# Patient Record
Sex: MALE | Race: WHITE | NOT HISPANIC OR LATINO
[De-identification: names, ages, dates, MRNs, and addresses within clinical notes are randomized per-mention and may not be internally consistent; named-entity substitution may affect disease eponyms.]

---

## 2017-04-17 ENCOUNTER — FORM ENCOUNTER (OUTPATIENT)
Age: 77
End: 2017-04-17

## 2017-04-18 ENCOUNTER — OUTPATIENT (OUTPATIENT)
Dept: OUTPATIENT SERVICES | Facility: HOSPITAL | Age: 77
LOS: 1 days | End: 2017-04-18
Payer: MEDICARE

## 2017-04-18 ENCOUNTER — APPOINTMENT (OUTPATIENT)
Dept: RADIATION ONCOLOGY | Facility: CLINIC | Age: 77
End: 2017-04-18

## 2017-04-18 VITALS — SYSTOLIC BLOOD PRESSURE: 119 MMHG | DIASTOLIC BLOOD PRESSURE: 72 MMHG

## 2017-04-18 VITALS
HEART RATE: 71 BPM | DIASTOLIC BLOOD PRESSURE: 72 MMHG | WEIGHT: 144.4 LBS | SYSTOLIC BLOOD PRESSURE: 137 MMHG | OXYGEN SATURATION: 99 % | BODY MASS INDEX: 19.56 KG/M2 | HEIGHT: 72 IN

## 2017-04-18 DIAGNOSIS — Z90.49 ACQUIRED ABSENCE OF OTHER SPECIFIED PARTS OF DIGESTIVE TRACT: Chronic | ICD-10-CM

## 2017-04-18 DIAGNOSIS — C32.3 MALIGNANT NEOPLASM OF LARYNGEAL CARTILAGE: Chronic | ICD-10-CM

## 2017-04-18 DIAGNOSIS — Z90.89 ACQUIRED ABSENCE OF OTHER ORGANS: Chronic | ICD-10-CM

## 2017-04-18 PROCEDURE — 70491 CT SOFT TISSUE NECK W/DYE: CPT | Mod: 26

## 2017-04-18 PROCEDURE — 71250 CT THORAX DX C-: CPT

## 2017-04-18 PROCEDURE — 71250 CT THORAX DX C-: CPT | Mod: 26

## 2017-04-18 PROCEDURE — 70491 CT SOFT TISSUE NECK W/DYE: CPT

## 2017-04-19 ENCOUNTER — APPOINTMENT (OUTPATIENT)
Dept: OTOLARYNGOLOGY | Facility: CLINIC | Age: 77
End: 2017-04-19

## 2017-04-19 VITALS
SYSTOLIC BLOOD PRESSURE: 136 MMHG | BODY MASS INDEX: 19.5 KG/M2 | HEIGHT: 72 IN | HEART RATE: 69 BPM | TEMPERATURE: 98.6 F | WEIGHT: 144 LBS | DIASTOLIC BLOOD PRESSURE: 76 MMHG

## 2017-04-19 DIAGNOSIS — Z80.2 FAMILY HISTORY OF MALIGNANT NEOPLASM OF OTHER RESPIRATORY AND INTRATHORACIC ORGANS: ICD-10-CM

## 2017-04-19 DIAGNOSIS — Z80.3 FAMILY HISTORY OF MALIGNANT NEOPLASM OF BREAST: ICD-10-CM

## 2017-07-19 ENCOUNTER — APPOINTMENT (OUTPATIENT)
Dept: OTOLARYNGOLOGY | Facility: CLINIC | Age: 77
End: 2017-07-19

## 2017-07-19 VITALS
HEART RATE: 67 BPM | TEMPERATURE: 98.7 F | DIASTOLIC BLOOD PRESSURE: 74 MMHG | SYSTOLIC BLOOD PRESSURE: 125 MMHG | WEIGHT: 144 LBS | HEIGHT: 72 IN | BODY MASS INDEX: 19.5 KG/M2

## 2017-07-19 VITALS — WEIGHT: 145.6 LBS | BODY MASS INDEX: 19.75 KG/M2

## 2017-08-09 ENCOUNTER — APPOINTMENT (OUTPATIENT)
Dept: OTOLARYNGOLOGY | Facility: CLINIC | Age: 77
End: 2017-08-09
Payer: MEDICARE

## 2017-08-09 VITALS
BODY MASS INDEX: 19.64 KG/M2 | SYSTOLIC BLOOD PRESSURE: 145 MMHG | DIASTOLIC BLOOD PRESSURE: 77 MMHG | HEIGHT: 72 IN | TEMPERATURE: 99.2 F | WEIGHT: 145 LBS | HEART RATE: 81 BPM

## 2017-08-09 PROCEDURE — 99213 OFFICE O/P EST LOW 20 MIN: CPT | Mod: 25

## 2017-08-09 PROCEDURE — 31575 DIAGNOSTIC LARYNGOSCOPY: CPT

## 2017-11-01 ENCOUNTER — OUTPATIENT (OUTPATIENT)
Dept: OUTPATIENT SERVICES | Facility: HOSPITAL | Age: 77
LOS: 1 days | End: 2017-11-01
Payer: MEDICARE

## 2017-11-01 DIAGNOSIS — C32.3 MALIGNANT NEOPLASM OF LARYNGEAL CARTILAGE: Chronic | ICD-10-CM

## 2017-11-01 DIAGNOSIS — Z90.89 ACQUIRED ABSENCE OF OTHER ORGANS: Chronic | ICD-10-CM

## 2017-11-01 DIAGNOSIS — Z90.49 ACQUIRED ABSENCE OF OTHER SPECIFIED PARTS OF DIGESTIVE TRACT: Chronic | ICD-10-CM

## 2017-11-01 PROCEDURE — 70491 CT SOFT TISSUE NECK W/DYE: CPT | Mod: 26

## 2017-11-01 PROCEDURE — 70491 CT SOFT TISSUE NECK W/DYE: CPT

## 2017-11-14 ENCOUNTER — APPOINTMENT (OUTPATIENT)
Dept: RADIATION ONCOLOGY | Facility: CLINIC | Age: 77
End: 2017-11-14
Payer: MEDICARE

## 2017-11-14 PROCEDURE — 31575 DIAGNOSTIC LARYNGOSCOPY: CPT

## 2017-11-14 PROCEDURE — 99214 OFFICE O/P EST MOD 30 MIN: CPT | Mod: 25

## 2017-11-14 RX ORDER — NYSTATIN 100000 [USP'U]/ML
100000 SUSPENSION ORAL 3 TIMES DAILY
Qty: 105 | Refills: 0 | Status: DISCONTINUED | COMMUNITY
Start: 2017-07-19 | End: 2017-11-14

## 2017-12-07 ENCOUNTER — CLINICAL ADVICE (OUTPATIENT)
Age: 77
End: 2017-12-07

## 2017-12-11 ENCOUNTER — APPOINTMENT (OUTPATIENT)
Dept: OTOLARYNGOLOGY | Facility: CLINIC | Age: 77
End: 2017-12-11
Payer: MEDICARE

## 2017-12-11 VITALS
WEIGHT: 142 LBS | SYSTOLIC BLOOD PRESSURE: 147 MMHG | DIASTOLIC BLOOD PRESSURE: 76 MMHG | HEIGHT: 72 IN | BODY MASS INDEX: 19.23 KG/M2 | HEART RATE: 64 BPM

## 2017-12-11 PROCEDURE — 99214 OFFICE O/P EST MOD 30 MIN: CPT | Mod: 25

## 2017-12-11 PROCEDURE — 31575 DIAGNOSTIC LARYNGOSCOPY: CPT

## 2017-12-15 ENCOUNTER — OUTPATIENT (OUTPATIENT)
Dept: OUTPATIENT SERVICES | Facility: HOSPITAL | Age: 77
LOS: 1 days | End: 2017-12-15
Payer: MEDICARE

## 2017-12-15 ENCOUNTER — RESULT REVIEW (OUTPATIENT)
Age: 77
End: 2017-12-15

## 2017-12-15 DIAGNOSIS — Z90.89 ACQUIRED ABSENCE OF OTHER ORGANS: Chronic | ICD-10-CM

## 2017-12-15 DIAGNOSIS — C10.9 MALIGNANT NEOPLASM OF OROPHARYNX, UNSPECIFIED: ICD-10-CM

## 2017-12-15 DIAGNOSIS — C32.1 MALIGNANT NEOPLASM OF SUPRAGLOTTIS: ICD-10-CM

## 2017-12-15 DIAGNOSIS — C32.3 MALIGNANT NEOPLASM OF LARYNGEAL CARTILAGE: Chronic | ICD-10-CM

## 2017-12-15 DIAGNOSIS — Z90.49 ACQUIRED ABSENCE OF OTHER SPECIFIED PARTS OF DIGESTIVE TRACT: Chronic | ICD-10-CM

## 2017-12-15 LAB — SURGICAL PATHOLOGY STUDY: SIGNIFICANT CHANGE UP

## 2017-12-15 PROCEDURE — 88321 CONSLTJ&REPRT SLD PREP ELSWR: CPT

## 2017-12-20 ENCOUNTER — APPOINTMENT (OUTPATIENT)
Dept: OTOLARYNGOLOGY | Facility: CLINIC | Age: 77
End: 2017-12-20
Payer: MEDICARE

## 2017-12-20 VITALS
HEIGHT: 72 IN | WEIGHT: 142 LBS | BODY MASS INDEX: 19.23 KG/M2 | SYSTOLIC BLOOD PRESSURE: 146 MMHG | HEART RATE: 71 BPM | DIASTOLIC BLOOD PRESSURE: 71 MMHG | TEMPERATURE: 98.8 F

## 2017-12-20 VITALS — WEIGHT: 142 LBS | HEIGHT: 72 IN | BODY MASS INDEX: 19.23 KG/M2

## 2017-12-20 DIAGNOSIS — K13.79 OTHER LESIONS OF ORAL MUCOSA: ICD-10-CM

## 2017-12-20 PROCEDURE — 99214 OFFICE O/P EST MOD 30 MIN: CPT | Mod: 25

## 2017-12-20 PROCEDURE — 31575 DIAGNOSTIC LARYNGOSCOPY: CPT

## 2017-12-28 ENCOUNTER — OUTPATIENT (OUTPATIENT)
Dept: OUTPATIENT SERVICES | Facility: HOSPITAL | Age: 77
LOS: 1 days | End: 2017-12-28
Payer: MEDICARE

## 2017-12-28 DIAGNOSIS — Z90.89 ACQUIRED ABSENCE OF OTHER ORGANS: Chronic | ICD-10-CM

## 2017-12-28 DIAGNOSIS — Z90.49 ACQUIRED ABSENCE OF OTHER SPECIFIED PARTS OF DIGESTIVE TRACT: Chronic | ICD-10-CM

## 2017-12-28 DIAGNOSIS — C32.3 MALIGNANT NEOPLASM OF LARYNGEAL CARTILAGE: Chronic | ICD-10-CM

## 2017-12-28 PROCEDURE — 71046 X-RAY EXAM CHEST 2 VIEWS: CPT

## 2017-12-28 PROCEDURE — 71020: CPT | Mod: 26

## 2018-01-22 VITALS
DIASTOLIC BLOOD PRESSURE: 74 MMHG | SYSTOLIC BLOOD PRESSURE: 154 MMHG | TEMPERATURE: 98 F | WEIGHT: 145.06 LBS | HEART RATE: 86 BPM | RESPIRATION RATE: 16 BRPM | OXYGEN SATURATION: 99 % | HEIGHT: 72 IN

## 2018-01-22 NOTE — PATIENT PROFILE ADULT. - PSH
Carcinoma of laryngeal cartilages    Elective surgery  Lymph Nodes Removal of the Neck  Elective surgery  Biopsy of Larynx  History of tonsillectomy    S/P appendectomy

## 2018-01-23 ENCOUNTER — RESULT REVIEW (OUTPATIENT)
Age: 78
End: 2018-01-23

## 2018-01-23 ENCOUNTER — APPOINTMENT (OUTPATIENT)
Dept: OTOLARYNGOLOGY | Facility: HOSPITAL | Age: 78
End: 2018-01-23

## 2018-01-23 ENCOUNTER — OUTPATIENT (OUTPATIENT)
Dept: INPATIENT UNIT | Facility: HOSPITAL | Age: 78
LOS: 1 days | Discharge: HOME | End: 2018-01-23
Payer: MEDICARE

## 2018-01-23 DIAGNOSIS — Z41.9 ENCOUNTER FOR PROCEDURE FOR PURPOSES OTHER THAN REMEDYING HEALTH STATE, UNSPECIFIED: Chronic | ICD-10-CM

## 2018-01-23 DIAGNOSIS — C32.3 MALIGNANT NEOPLASM OF LARYNGEAL CARTILAGE: Chronic | ICD-10-CM

## 2018-01-23 DIAGNOSIS — Z90.89 ACQUIRED ABSENCE OF OTHER ORGANS: Chronic | ICD-10-CM

## 2018-01-23 DIAGNOSIS — Z90.49 ACQUIRED ABSENCE OF OTHER SPECIFIED PARTS OF DIGESTIVE TRACT: Chronic | ICD-10-CM

## 2018-01-23 PROCEDURE — 31535 LARYNGOSCOPY W/BIOPSY: CPT

## 2018-01-23 PROCEDURE — 42892 REVISION OF PHARYNGEAL WALLS: CPT

## 2018-01-23 PROCEDURE — 15733 MUSC MYOQ/FSCQ FLP H&N PEDCL: CPT

## 2018-01-23 RX ORDER — ONDANSETRON 8 MG/1
4 TABLET, FILM COATED ORAL EVERY 6 HOURS
Qty: 0 | Refills: 0 | Status: DISCONTINUED | OUTPATIENT
Start: 2018-01-23 | End: 2018-01-24

## 2018-01-23 RX ORDER — ACETAMINOPHEN 500 MG
985 TABLET ORAL EVERY 4 HOURS
Qty: 0 | Refills: 0 | Status: DISCONTINUED | OUTPATIENT
Start: 2018-01-23 | End: 2018-01-24

## 2018-01-23 RX ORDER — MORPHINE SULFATE 50 MG/1
4 CAPSULE, EXTENDED RELEASE ORAL
Qty: 0 | Refills: 0 | Status: DISCONTINUED | OUTPATIENT
Start: 2018-01-23 | End: 2018-01-23

## 2018-01-23 RX ORDER — CHLORHEXIDINE GLUCONATE 213 G/1000ML
15 SOLUTION TOPICAL THREE TIMES A DAY
Qty: 0 | Refills: 0 | Status: DISCONTINUED | OUTPATIENT
Start: 2018-01-24 | End: 2018-01-24

## 2018-01-23 RX ORDER — DEXAMETHASONE 0.5 MG/5ML
8 ELIXIR ORAL EVERY 8 HOURS
Qty: 0 | Refills: 0 | Status: DISCONTINUED | OUTPATIENT
Start: 2018-01-23 | End: 2018-01-24

## 2018-01-23 RX ORDER — OXYCODONE AND ACETAMINOPHEN 5; 325 MG/1; MG/1
1 TABLET ORAL EVERY 4 HOURS
Qty: 0 | Refills: 0 | Status: DISCONTINUED | OUTPATIENT
Start: 2018-01-23 | End: 2018-01-24

## 2018-01-23 RX ORDER — SODIUM CHLORIDE 9 MG/ML
1000 INJECTION, SOLUTION INTRAVENOUS
Qty: 0 | Refills: 0 | Status: DISCONTINUED | OUTPATIENT
Start: 2018-01-23 | End: 2018-01-24

## 2018-01-23 RX ADMIN — Medication 101.6 MILLIGRAM(S): at 18:15

## 2018-01-23 NOTE — BRIEF OPERATIVE NOTE - PROCEDURE
<<-----Click on this checkbox to enter Procedure Direct laryngoscopy with biopsy  01/23/2018  and excisional biopsy of right soft palate lesion  Active  Ari Franco

## 2018-01-23 NOTE — PROGRESS NOTE ADULT - SUBJECTIVE AND OBJECTIVE BOX
HPI: 77y Male s/p DL/bx, excisional bx of right soft palate lesion. Seen in SDU. Pain controlled. no nausea. doing well. not attempted PO as yet.     Vital Signs Last 24 Hrs  T(C): 36.2 (23 Jan 2018 17:12), Max: 36.7 (23 Jan 2018 14:30)  T(F): 97.1 (23 Jan 2018 17:12), Max: 98.1 (23 Jan 2018 14:30)  HR: 80 (23 Jan 2018 17:12) (72 - 88)  BP: 153/75 (23 Jan 2018 17:12) (144/80 - 176/94)  BP(mean): 107 (23 Jan 2018 17:12) (103 - 121)  RR: 18 (23 Jan 2018 17:12) (9 - 18)  SpO2: 99% (23 Jan 2018 17:12) (97% - 99%)    PHYSICAL EXAM:  NAD, breathing comfortably  AAO3, conversant  SOUTH  OC/OP: incision intake, no active bleed, mucosal moist    Assessment/Plan:  77y Male s/p DL/bx, excisional bx of right soft palate lesion. Doing well post op  - PO clears to progress to soft tomorrow  - Ambulate and SCDs when in bed  - PRN analgesia and antiemetics  - observe overnight for airway swelling  - dispo pending assessment tomorrow am  - resume all home meds    [facility] at [phone] with any questions/concerns.

## 2018-01-23 NOTE — BRIEF OPERATIVE NOTE - PROCEDURE
<<-----Click on this checkbox to enter Procedure Direct laryngoscopy with biopsy  01/23/2018  and esophagoscopy  Active  [facility]

## 2018-01-23 NOTE — PACU DISCHARGE NOTE - COMMENTS
pt aao x3.  VSS.  no oral bleeding noted.  O2 sat 99% on face tent.  denies c/o pain or SOB.  report given to CLARK Darden on 8 lachman.  pt to go to [facility] via stretcher on monitor with RN and transport

## 2018-01-24 VITALS — TEMPERATURE: 99 F

## 2018-01-24 PROCEDURE — 88331 PATH CONSLTJ SURG 1 BLK 1SPC: CPT

## 2018-01-24 PROCEDURE — 42120 REMOVE PALATE/LESION: CPT

## 2018-01-24 PROCEDURE — 88307 TISSUE EXAM BY PATHOLOGIST: CPT

## 2018-01-24 PROCEDURE — 31525 DX LARYNGOSCOPY EXCL NB: CPT

## 2018-01-24 PROCEDURE — 88305 TISSUE EXAM BY PATHOLOGIST: CPT

## 2018-01-24 PROCEDURE — 14040 TIS TRNFR F/C/C/M/N/A/G/H/F: CPT

## 2018-01-24 RX ORDER — BENZOCAINE AND MENTHOL 5; 1 G/100ML; G/100ML
1 LIQUID ORAL EVERY 4 HOURS
Qty: 0 | Refills: 0 | Status: DISCONTINUED | OUTPATIENT
Start: 2018-01-24 | End: 2018-01-24

## 2018-01-24 RX ADMIN — Medication 101.6 MILLIGRAM(S): at 01:55

## 2018-01-24 RX ADMIN — CHLORHEXIDINE GLUCONATE 15 MILLILITER(S): 213 SOLUTION TOPICAL at 05:54

## 2018-01-24 RX ADMIN — CHLORHEXIDINE GLUCONATE 15 MILLILITER(S): 213 SOLUTION TOPICAL at 14:51

## 2018-01-24 NOTE — PROGRESS NOTE ADULT - SUBJECTIVE AND OBJECTIVE BOX
77y Male s/p DL/bx, excisional bx of right soft palate lesion. Seen in SDU. Pain controlled. no nausea. doing well. Tolerated PO. ambulating    Vital Signs Last 24 Hrs  T(C): 36.6 (24 Jan 2018 06:13), Max: 36.8 (23 Jan 2018 21:52)  T(F): 97.8 (24 Jan 2018 06:13), Max: 98.3 (23 Jan 2018 21:52)  HR: 66 (24 Jan 2018 05:40) (66 - 88)  BP: 148/67 (24 Jan 2018 05:40) (126/60 - 176/94)  BP(mean): 97 (24 Jan 2018 05:40) (86 - 121)  RR: 18 (24 Jan 2018 05:40) (9 - 18)  SpO2: 96% (24 Jan 2018 05:40) (95% - 100%)    PHYSICAL EXAM:  NAD, breathing comfortably  AAO3, conversant  SOUTH  OC/OP: incision intake, no active bleed, mucosal moist    Assessment/Plan:  77y Male s/p DL/bx, excisional bx of right soft palate lesion. Doing well post op  - dc home  - no additional meds  - rinse mouth tid with warm salt water and after meals and snacks  - Pt discussed with Dr Edy Galaviz ENT at [phone] with any questions/concerns.

## 2018-01-26 LAB — SURGICAL PATHOLOGY STUDY: SIGNIFICANT CHANGE UP

## 2018-01-29 ENCOUNTER — APPOINTMENT (OUTPATIENT)
Dept: OTOLARYNGOLOGY | Facility: CLINIC | Age: 78
End: 2018-01-29
Payer: MEDICARE

## 2018-01-29 ENCOUNTER — FORM ENCOUNTER (OUTPATIENT)
Age: 78
End: 2018-01-29

## 2018-01-29 ENCOUNTER — APPOINTMENT (OUTPATIENT)
Dept: RADIATION ONCOLOGY | Facility: CLINIC | Age: 78
End: 2018-01-29
Payer: MEDICARE

## 2018-01-29 VITALS
DIASTOLIC BLOOD PRESSURE: 69 MMHG | SYSTOLIC BLOOD PRESSURE: 140 MMHG | WEIGHT: 140 LBS | BODY MASS INDEX: 18.96 KG/M2 | HEART RATE: 75 BPM | HEIGHT: 72 IN

## 2018-01-29 VITALS — OXYGEN SATURATION: 100 % | DIASTOLIC BLOOD PRESSURE: 71 MMHG | HEART RATE: 71 BPM | SYSTOLIC BLOOD PRESSURE: 150 MMHG

## 2018-01-29 VITALS
OXYGEN SATURATION: 99 % | BODY MASS INDEX: 19.06 KG/M2 | WEIGHT: 140.5 LBS | HEART RATE: 79 BPM | SYSTOLIC BLOOD PRESSURE: 138 MMHG | DIASTOLIC BLOOD PRESSURE: 76 MMHG

## 2018-01-29 DIAGNOSIS — C10.0: ICD-10-CM

## 2018-01-29 PROCEDURE — 99215 OFFICE O/P EST HI 40 MIN: CPT

## 2018-01-29 PROCEDURE — 31575 DIAGNOSTIC LARYNGOSCOPY: CPT | Mod: 79

## 2018-01-30 ENCOUNTER — OUTPATIENT (OUTPATIENT)
Dept: OUTPATIENT SERVICES | Facility: HOSPITAL | Age: 78
LOS: 1 days | End: 2018-01-30
Payer: MEDICARE

## 2018-01-30 DIAGNOSIS — Z41.9 ENCOUNTER FOR PROCEDURE FOR PURPOSES OTHER THAN REMEDYING HEALTH STATE, UNSPECIFIED: Chronic | ICD-10-CM

## 2018-01-30 DIAGNOSIS — C32.3 MALIGNANT NEOPLASM OF LARYNGEAL CARTILAGE: Chronic | ICD-10-CM

## 2018-01-30 DIAGNOSIS — Z90.89 ACQUIRED ABSENCE OF OTHER ORGANS: Chronic | ICD-10-CM

## 2018-01-30 DIAGNOSIS — Z90.49 ACQUIRED ABSENCE OF OTHER SPECIFIED PARTS OF DIGESTIVE TRACT: Chronic | ICD-10-CM

## 2018-01-30 LAB — GLUCOSE BLDC GLUCOMTR-MCNC: 87 MG/DL — SIGNIFICANT CHANGE UP (ref 70–99)

## 2018-01-30 PROCEDURE — 82962 GLUCOSE BLOOD TEST: CPT

## 2018-01-30 PROCEDURE — A9552: CPT

## 2018-01-30 PROCEDURE — 78815 PET IMAGE W/CT SKULL-THIGH: CPT | Mod: 26

## 2018-01-30 PROCEDURE — 78815 PET IMAGE W/CT SKULL-THIGH: CPT

## 2018-02-02 PROCEDURE — 77470 SPECIAL RADIATION TREATMENT: CPT | Mod: 26

## 2018-03-06 PROCEDURE — 77301 RADIOTHERAPY DOSE PLAN IMRT: CPT | Mod: 26

## 2018-03-06 PROCEDURE — 77300 RADIATION THERAPY DOSE PLAN: CPT | Mod: 26

## 2018-03-06 PROCEDURE — 77338 DESIGN MLC DEVICE FOR IMRT: CPT | Mod: 26

## 2018-03-07 ENCOUNTER — APPOINTMENT (OUTPATIENT)
Dept: OTOLARYNGOLOGY | Facility: CLINIC | Age: 78
End: 2018-03-07
Payer: MEDICARE

## 2018-03-07 VITALS
HEIGHT: 72 IN | WEIGHT: 138 LBS | BODY MASS INDEX: 18.69 KG/M2 | HEART RATE: 64 BPM | DIASTOLIC BLOOD PRESSURE: 75 MMHG | SYSTOLIC BLOOD PRESSURE: 147 MMHG

## 2018-03-07 PROCEDURE — 99024 POSTOP FOLLOW-UP VISIT: CPT

## 2018-03-12 PROCEDURE — 77387B: CUSTOM | Mod: 26

## 2018-03-12 PROCEDURE — 77427 RADIATION TX MANAGEMENT X5: CPT

## 2018-03-13 VITALS — SYSTOLIC BLOOD PRESSURE: 143 MMHG | HEART RATE: 78 BPM | DIASTOLIC BLOOD PRESSURE: 84 MMHG

## 2018-03-13 VITALS — WEIGHT: 143 LBS | BODY MASS INDEX: 19.39 KG/M2

## 2018-03-13 VITALS
OXYGEN SATURATION: 98 % | HEART RATE: 69 BPM | SYSTOLIC BLOOD PRESSURE: 142 MMHG | DIASTOLIC BLOOD PRESSURE: 82 MMHG | RESPIRATION RATE: 18 BRPM

## 2018-03-13 PROCEDURE — 77387B: CUSTOM | Mod: 26

## 2018-03-14 PROCEDURE — 77387B: CUSTOM | Mod: 26

## 2018-03-15 PROCEDURE — 77387B: CUSTOM | Mod: 26

## 2018-03-16 PROCEDURE — 77387B: CUSTOM | Mod: 26

## 2018-03-19 PROCEDURE — 77427 RADIATION TX MANAGEMENT X5: CPT

## 2018-03-19 PROCEDURE — 77387B: CUSTOM | Mod: 26

## 2018-03-20 PROCEDURE — 77387B: CUSTOM | Mod: 26

## 2018-03-21 VITALS — BODY MASS INDEX: 19.91 KG/M2 | WEIGHT: 147 LBS | HEIGHT: 72 IN

## 2018-03-21 PROCEDURE — 77387B: CUSTOM | Mod: 26

## 2018-03-22 PROCEDURE — 77387B: CUSTOM | Mod: 26

## 2018-03-23 PROCEDURE — 77387B: CUSTOM | Mod: 26

## 2018-03-26 PROCEDURE — 77427 RADIATION TX MANAGEMENT X5: CPT

## 2018-03-26 PROCEDURE — 77387B: CUSTOM | Mod: 26

## 2018-03-27 VITALS — WEIGHT: 142.6 LBS | BODY MASS INDEX: 19.34 KG/M2

## 2018-03-27 VITALS
HEART RATE: 55 BPM | DIASTOLIC BLOOD PRESSURE: 81 MMHG | OXYGEN SATURATION: 100 % | SYSTOLIC BLOOD PRESSURE: 148 MMHG | RESPIRATION RATE: 18 BRPM

## 2018-03-27 PROCEDURE — 77387B: CUSTOM | Mod: 26

## 2018-03-28 PROCEDURE — 77387B: CUSTOM | Mod: 26

## 2018-03-29 ENCOUNTER — APPOINTMENT (OUTPATIENT)
Dept: OTOLARYNGOLOGY | Facility: CLINIC | Age: 78
End: 2018-03-29
Payer: MEDICARE

## 2018-03-29 PROCEDURE — G8997: CPT | Mod: GN,NC,CI

## 2018-03-29 PROCEDURE — 77387B: CUSTOM | Mod: 26

## 2018-03-29 PROCEDURE — 92526 ORAL FUNCTION THERAPY: CPT | Mod: GN

## 2018-03-29 PROCEDURE — 92610 EVALUATE SWALLOWING FUNCTION: CPT | Mod: GN

## 2018-03-29 PROCEDURE — G8996: CPT | Mod: GN,NC,CJ

## 2018-03-30 PROCEDURE — 77387B: CUSTOM | Mod: 26

## 2018-04-02 PROCEDURE — 77387B: CUSTOM | Mod: 26

## 2018-04-02 PROCEDURE — 77427 RADIATION TX MANAGEMENT X5: CPT

## 2018-04-03 VITALS
OXYGEN SATURATION: 100 % | RESPIRATION RATE: 18 BRPM | DIASTOLIC BLOOD PRESSURE: 78 MMHG | SYSTOLIC BLOOD PRESSURE: 123 MMHG | HEART RATE: 73 BPM

## 2018-04-03 VITALS — WEIGHT: 143.8 LBS | BODY MASS INDEX: 19.5 KG/M2

## 2018-04-03 PROCEDURE — 77387B: CUSTOM | Mod: 26

## 2018-04-04 PROCEDURE — 77387B: CUSTOM | Mod: 26

## 2018-04-05 PROCEDURE — 77387B: CUSTOM | Mod: 26

## 2018-04-06 PROCEDURE — 77387B: CUSTOM | Mod: 26

## 2018-04-09 PROCEDURE — 77387B: CUSTOM | Mod: 26

## 2018-04-09 PROCEDURE — 77427 RADIATION TX MANAGEMENT X5: CPT

## 2018-04-10 ENCOUNTER — APPOINTMENT (OUTPATIENT)
Dept: OTOLARYNGOLOGY | Facility: CLINIC | Age: 78
End: 2018-04-10
Payer: MEDICARE

## 2018-04-10 VITALS
HEART RATE: 59 BPM | RESPIRATION RATE: 18 BRPM | OXYGEN SATURATION: 100 % | DIASTOLIC BLOOD PRESSURE: 83 MMHG | SYSTOLIC BLOOD PRESSURE: 135 MMHG

## 2018-04-10 VITALS — HEART RATE: 65 BPM | SYSTOLIC BLOOD PRESSURE: 133 MMHG | DIASTOLIC BLOOD PRESSURE: 74 MMHG

## 2018-04-10 VITALS — WEIGHT: 142.9 LBS | BODY MASS INDEX: 19.38 KG/M2

## 2018-04-10 PROCEDURE — 92526 ORAL FUNCTION THERAPY: CPT | Mod: GN

## 2018-04-10 PROCEDURE — 77387B: CUSTOM | Mod: 26

## 2018-04-10 PROCEDURE — G8996: CPT | Mod: GN,NC,CJ

## 2018-04-10 PROCEDURE — G8997: CPT | Mod: GN,NC,CI

## 2018-04-11 VITALS — WEIGHT: 143.6 LBS | BODY MASS INDEX: 19.48 KG/M2

## 2018-04-11 PROCEDURE — 77387B: CUSTOM | Mod: 26

## 2018-04-12 PROCEDURE — 77387B: CUSTOM | Mod: 26

## 2018-04-13 PROCEDURE — 77387B: CUSTOM | Mod: 26

## 2018-04-16 PROCEDURE — 77427 RADIATION TX MANAGEMENT X5: CPT

## 2018-04-16 PROCEDURE — 77387B: CUSTOM | Mod: 26

## 2018-04-17 VITALS
HEART RATE: 62 BPM | SYSTOLIC BLOOD PRESSURE: 134 MMHG | DIASTOLIC BLOOD PRESSURE: 74 MMHG | OXYGEN SATURATION: 100 % | WEIGHT: 139.2 LBS | BODY MASS INDEX: 18.88 KG/M2 | RESPIRATION RATE: 18 BRPM

## 2018-04-17 VITALS — SYSTOLIC BLOOD PRESSURE: 146 MMHG | HEART RATE: 63 BPM | DIASTOLIC BLOOD PRESSURE: 75 MMHG

## 2018-04-17 PROCEDURE — 77387B: CUSTOM | Mod: 26

## 2018-04-18 PROCEDURE — 77387B: CUSTOM | Mod: 26

## 2018-04-19 PROCEDURE — 77387B: CUSTOM | Mod: 26

## 2018-04-20 PROCEDURE — 77387B: CUSTOM | Mod: 26

## 2018-04-23 PROCEDURE — 77387B: CUSTOM | Mod: 26

## 2018-04-23 PROCEDURE — 77427 RADIATION TX MANAGEMENT X5: CPT

## 2018-04-24 VITALS — DIASTOLIC BLOOD PRESSURE: 75 MMHG | SYSTOLIC BLOOD PRESSURE: 135 MMHG

## 2018-04-24 VITALS
OXYGEN SATURATION: 100 % | HEART RATE: 67 BPM | WEIGHT: 141.6 LBS | RESPIRATION RATE: 18 BRPM | SYSTOLIC BLOOD PRESSURE: 135 MMHG | DIASTOLIC BLOOD PRESSURE: 85 MMHG | BODY MASS INDEX: 19.2 KG/M2

## 2018-04-24 PROCEDURE — 77387B: CUSTOM | Mod: 26

## 2018-04-25 PROCEDURE — 77387B: CUSTOM | Mod: 26

## 2018-04-26 PROCEDURE — 77387B: CUSTOM | Mod: 26

## 2018-04-27 PROCEDURE — 77387B: CUSTOM | Mod: 26

## 2018-05-08 ENCOUNTER — APPOINTMENT (OUTPATIENT)
Dept: OTOLARYNGOLOGY | Facility: CLINIC | Age: 78
End: 2018-05-08
Payer: MEDICARE

## 2018-05-08 VITALS
DIASTOLIC BLOOD PRESSURE: 69 MMHG | BODY MASS INDEX: 18.42 KG/M2 | HEART RATE: 61 BPM | WEIGHT: 136 LBS | HEIGHT: 72 IN | SYSTOLIC BLOOD PRESSURE: 107 MMHG

## 2018-05-08 PROCEDURE — 99214 OFFICE O/P EST MOD 30 MIN: CPT | Mod: 25

## 2018-05-08 PROCEDURE — 31575 DIAGNOSTIC LARYNGOSCOPY: CPT

## 2018-05-22 ENCOUNTER — APPOINTMENT (OUTPATIENT)
Dept: RADIATION ONCOLOGY | Facility: CLINIC | Age: 78
End: 2018-05-22
Payer: MEDICARE

## 2018-05-22 VITALS — DIASTOLIC BLOOD PRESSURE: 75 MMHG | SYSTOLIC BLOOD PRESSURE: 133 MMHG | HEART RATE: 67 BPM

## 2018-05-22 VITALS
HEART RATE: 60 BPM | RESPIRATION RATE: 18 BRPM | DIASTOLIC BLOOD PRESSURE: 69 MMHG | SYSTOLIC BLOOD PRESSURE: 126 MMHG | OXYGEN SATURATION: 100 %

## 2018-05-22 VITALS — WEIGHT: 138.2 LBS | BODY MASS INDEX: 18.74 KG/M2

## 2018-05-22 PROCEDURE — 99024 POSTOP FOLLOW-UP VISIT: CPT

## 2018-05-22 RX ORDER — SALIVA SUBSTITUTE COMB NO.10
POWDER IN PACKET (EA) MUCOUS MEMBRANE
Qty: 1 | Refills: 3 | Status: DISCONTINUED | COMMUNITY
Start: 2018-04-25 | End: 2018-05-22

## 2018-05-23 ENCOUNTER — APPOINTMENT (OUTPATIENT)
Dept: OTOLARYNGOLOGY | Facility: CLINIC | Age: 78
End: 2018-05-23

## 2018-05-24 ENCOUNTER — APPOINTMENT (OUTPATIENT)
Dept: OTOLARYNGOLOGY | Facility: CLINIC | Age: 78
End: 2018-05-24

## 2018-07-10 ENCOUNTER — APPOINTMENT (OUTPATIENT)
Dept: RADIATION ONCOLOGY | Facility: CLINIC | Age: 78
End: 2018-07-10
Payer: MEDICARE

## 2018-07-10 ENCOUNTER — APPOINTMENT (OUTPATIENT)
Dept: OTOLARYNGOLOGY | Facility: CLINIC | Age: 78
End: 2018-07-10
Payer: MEDICARE

## 2018-07-10 VITALS
OXYGEN SATURATION: 99 % | HEART RATE: 62 BPM | WEIGHT: 134.2 LBS | BODY MASS INDEX: 18.2 KG/M2 | SYSTOLIC BLOOD PRESSURE: 119 MMHG | RESPIRATION RATE: 16 BRPM | DIASTOLIC BLOOD PRESSURE: 69 MMHG

## 2018-07-10 VITALS
HEART RATE: 64 BPM | SYSTOLIC BLOOD PRESSURE: 128 MMHG | WEIGHT: 133 LBS | BODY MASS INDEX: 18.01 KG/M2 | DIASTOLIC BLOOD PRESSURE: 71 MMHG | HEIGHT: 72 IN

## 2018-07-10 PROCEDURE — G8997: CPT | Mod: NC,GN,CI

## 2018-07-10 PROCEDURE — 99214 OFFICE O/P EST MOD 30 MIN: CPT | Mod: 25

## 2018-07-10 PROCEDURE — 92610 EVALUATE SWALLOWING FUNCTION: CPT | Mod: GN

## 2018-07-10 PROCEDURE — 31575 DIAGNOSTIC LARYNGOSCOPY: CPT

## 2018-07-10 PROCEDURE — G8996: CPT | Mod: NC,GN,CJ

## 2018-07-24 ENCOUNTER — FORM ENCOUNTER (OUTPATIENT)
Age: 78
End: 2018-07-24

## 2018-07-25 ENCOUNTER — OUTPATIENT (OUTPATIENT)
Dept: OUTPATIENT SERVICES | Facility: HOSPITAL | Age: 78
LOS: 1 days | End: 2018-07-25
Payer: MEDICARE

## 2018-07-25 DIAGNOSIS — Z90.49 ACQUIRED ABSENCE OF OTHER SPECIFIED PARTS OF DIGESTIVE TRACT: Chronic | ICD-10-CM

## 2018-07-25 DIAGNOSIS — C32.3 MALIGNANT NEOPLASM OF LARYNGEAL CARTILAGE: Chronic | ICD-10-CM

## 2018-07-25 DIAGNOSIS — Z41.9 ENCOUNTER FOR PROCEDURE FOR PURPOSES OTHER THAN REMEDYING HEALTH STATE, UNSPECIFIED: Chronic | ICD-10-CM

## 2018-07-25 DIAGNOSIS — Z90.89 ACQUIRED ABSENCE OF OTHER ORGANS: Chronic | ICD-10-CM

## 2018-07-25 LAB — GLUCOSE BLDC GLUCOMTR-MCNC: 88 MG/DL — SIGNIFICANT CHANGE UP (ref 70–99)

## 2018-07-25 PROCEDURE — 78815 PET IMAGE W/CT SKULL-THIGH: CPT | Mod: 26

## 2018-07-25 PROCEDURE — A9552: CPT

## 2018-07-25 PROCEDURE — 78815 PET IMAGE W/CT SKULL-THIGH: CPT

## 2018-07-25 PROCEDURE — 82962 GLUCOSE BLOOD TEST: CPT

## 2018-08-02 ENCOUNTER — APPOINTMENT (OUTPATIENT)
Dept: RADIATION ONCOLOGY | Facility: CLINIC | Age: 78
End: 2018-08-02
Payer: MEDICARE

## 2018-08-02 VITALS
OXYGEN SATURATION: 100 % | HEART RATE: 65 BPM | WEIGHT: 138 LBS | SYSTOLIC BLOOD PRESSURE: 145 MMHG | RESPIRATION RATE: 16 BRPM | BODY MASS INDEX: 18.72 KG/M2 | DIASTOLIC BLOOD PRESSURE: 72 MMHG

## 2018-08-02 PROCEDURE — 99214 OFFICE O/P EST MOD 30 MIN: CPT

## 2018-08-02 RX ORDER — LIDOCAINE HYDROCHLORIDE 20 MG/ML
2 SOLUTION OROPHARYNGEAL
Qty: 1 | Refills: 2 | Status: DISCONTINUED | COMMUNITY
Start: 2018-04-10 | End: 2018-08-02

## 2018-08-02 RX ORDER — DIPHENHYDRAMINE HYDROCHLORIDE AND LIDOCAINE HYDROCHLORIDE AND ALUMINUM HYDROXIDE AND MAGNESIUM HYDRO
KIT
Qty: 500 | Refills: 2 | Status: DISCONTINUED | COMMUNITY
Start: 2018-04-24 | End: 2018-08-02

## 2018-08-02 RX ORDER — GABAPENTIN 300 MG/1
300 CAPSULE ORAL
Qty: 90 | Refills: 1 | Status: DISCONTINUED | COMMUNITY
Start: 2018-03-13 | End: 2018-08-02

## 2018-08-30 ENCOUNTER — APPOINTMENT (OUTPATIENT)
Dept: RADIOLOGY | Facility: HOSPITAL | Age: 78
End: 2018-08-30

## 2018-09-17 ENCOUNTER — FORM ENCOUNTER (OUTPATIENT)
Age: 78
End: 2018-09-17

## 2018-09-18 ENCOUNTER — APPOINTMENT (OUTPATIENT)
Dept: RADIOLOGY | Facility: HOSPITAL | Age: 78
End: 2018-09-18
Payer: MEDICARE

## 2018-09-18 ENCOUNTER — APPOINTMENT (OUTPATIENT)
Dept: OTOLARYNGOLOGY | Facility: CLINIC | Age: 78
End: 2018-09-18
Payer: MEDICARE

## 2018-09-18 ENCOUNTER — OUTPATIENT (OUTPATIENT)
Dept: OUTPATIENT SERVICES | Facility: HOSPITAL | Age: 78
LOS: 1 days | End: 2018-09-18
Payer: MEDICARE

## 2018-09-18 DIAGNOSIS — Z90.89 ACQUIRED ABSENCE OF OTHER ORGANS: Chronic | ICD-10-CM

## 2018-09-18 DIAGNOSIS — C32.3 MALIGNANT NEOPLASM OF LARYNGEAL CARTILAGE: Chronic | ICD-10-CM

## 2018-09-18 DIAGNOSIS — Z41.9 ENCOUNTER FOR PROCEDURE FOR PURPOSES OTHER THAN REMEDYING HEALTH STATE, UNSPECIFIED: Chronic | ICD-10-CM

## 2018-09-18 DIAGNOSIS — R49.0 DYSPHONIA: ICD-10-CM

## 2018-09-18 DIAGNOSIS — Z90.49 ACQUIRED ABSENCE OF OTHER SPECIFIED PARTS OF DIGESTIVE TRACT: Chronic | ICD-10-CM

## 2018-09-18 PROCEDURE — G8996: CPT | Mod: CJ

## 2018-09-18 PROCEDURE — 92611 MOTION FLUOROSCOPY/SWALLOW: CPT | Mod: GN

## 2018-09-18 PROCEDURE — 74230 X-RAY XM SWLNG FUNCJ C+: CPT

## 2018-09-18 PROCEDURE — 31575 DIAGNOSTIC LARYNGOSCOPY: CPT

## 2018-09-18 PROCEDURE — 74230 X-RAY XM SWLNG FUNCJ C+: CPT | Mod: 26

## 2018-09-18 PROCEDURE — 74220 X-RAY XM ESOPHAGUS 1CNTRST: CPT

## 2018-09-18 PROCEDURE — 99214 OFFICE O/P EST MOD 30 MIN: CPT | Mod: 25

## 2018-09-18 PROCEDURE — G8997: CPT | Mod: CI

## 2018-10-16 ENCOUNTER — APPOINTMENT (OUTPATIENT)
Dept: OTOLARYNGOLOGY | Facility: CLINIC | Age: 78
End: 2018-10-16
Payer: MEDICARE

## 2018-10-16 PROCEDURE — G8996: CPT | Mod: NC,CJ,GN

## 2018-10-16 PROCEDURE — 92526 ORAL FUNCTION THERAPY: CPT | Mod: GN

## 2018-10-16 PROCEDURE — G8997: CPT | Mod: NC,CI,GN

## 2018-10-24 ENCOUNTER — FORM ENCOUNTER (OUTPATIENT)
Age: 78
End: 2018-10-24

## 2018-10-25 ENCOUNTER — OUTPATIENT (OUTPATIENT)
Dept: OUTPATIENT SERVICES | Facility: HOSPITAL | Age: 78
LOS: 1 days | End: 2018-10-25
Payer: MEDICARE

## 2018-10-25 DIAGNOSIS — Z90.89 ACQUIRED ABSENCE OF OTHER ORGANS: Chronic | ICD-10-CM

## 2018-10-25 DIAGNOSIS — C32.3 MALIGNANT NEOPLASM OF LARYNGEAL CARTILAGE: Chronic | ICD-10-CM

## 2018-10-25 DIAGNOSIS — Z41.9 ENCOUNTER FOR PROCEDURE FOR PURPOSES OTHER THAN REMEDYING HEALTH STATE, UNSPECIFIED: Chronic | ICD-10-CM

## 2018-10-25 DIAGNOSIS — Z90.49 ACQUIRED ABSENCE OF OTHER SPECIFIED PARTS OF DIGESTIVE TRACT: Chronic | ICD-10-CM

## 2018-10-25 LAB — GLUCOSE BLDC GLUCOMTR-MCNC: 85 MG/DL — SIGNIFICANT CHANGE UP (ref 70–99)

## 2018-10-25 PROCEDURE — 78815 PET IMAGE W/CT SKULL-THIGH: CPT | Mod: 26

## 2018-10-25 PROCEDURE — A9552: CPT

## 2018-10-25 PROCEDURE — 78815 PET IMAGE W/CT SKULL-THIGH: CPT

## 2018-10-25 PROCEDURE — 82962 GLUCOSE BLOOD TEST: CPT

## 2018-11-05 NOTE — VITALS
[Maximal Pain Intensity: 0/10] : 0/10 [Least Pain Intensity: 0/10] : 0/10 [80: Normal activity with effort; some signs or symptoms of disease.] : 80: Normal activity with effort; some signs or symptoms of disease.  [ECOG Performance Status: 1 - Restricted in physically strenuous activity but ambulatory and able to carry out work of a light or sedentary nature] : Performance Status: 1 - Restricted in physically strenuous activity but ambulatory and able to carry out work of a light or sedentary nature, e.g., light house work, office work

## 2018-11-06 ENCOUNTER — APPOINTMENT (OUTPATIENT)
Dept: RADIATION ONCOLOGY | Facility: CLINIC | Age: 78
End: 2018-11-06
Payer: MEDICARE

## 2018-11-06 VITALS
HEART RATE: 61 BPM | SYSTOLIC BLOOD PRESSURE: 155 MMHG | DIASTOLIC BLOOD PRESSURE: 78 MMHG | WEIGHT: 140 LBS | OXYGEN SATURATION: 100 % | BODY MASS INDEX: 18.99 KG/M2 | RESPIRATION RATE: 16 BRPM

## 2018-11-06 PROCEDURE — 99214 OFFICE O/P EST MOD 30 MIN: CPT | Mod: 25

## 2018-11-06 PROCEDURE — 31575 DIAGNOSTIC LARYNGOSCOPY: CPT

## 2018-11-06 NOTE — PHYSICAL EXAM
[Examination Of The Oral Cavity] : the lips and gums were normal [Normal Oral Cavity] : oral cavity was normal [Normal] : no focal deficits [Oriented To Time, Place, And Person] : oriented to person, place, and time [de-identified] : Scarring in right soft palate//tonsillar area at prior tumor location. No masses or lesions or bleeding. No irregularity on palpation of entire palate and GTS bilat. [de-identified] : grade 1-2 lymphedema and fibrosis of bilat neck

## 2018-11-06 NOTE — DATA REVIEWED
[No studies available for review at this time.] : No studies available for review at this time. [FreeTextEntry1] : I have personally reviewed all relevant imaging studies (PET, CT) and I have discussed the case with the referring physician.\par

## 2018-11-06 NOTE — REVIEW OF SYSTEMS
[Fever] : no fever [Chills] : no chills [Odynophagia] : no odynophagia [Chest Pain] : no chest pain [Shortness Of Breath] : no shortness of breath [Swollen Glands] : no swollen glands [Dysphagia] : dysphagia [Negative] : Constitutional [Mucosal Pain] : no mucosal pain [Skin Rash] : no skin rash [FreeTextEntry5] : as noted in HPI

## 2018-11-06 NOTE — HISTORY OF PRESENT ILLNESS
[FreeTextEntry1] : Mr. 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 He was found to have recurrence in the Rt soft palate, Opx 1/2018, and completed 7000 cGy to the oropharynx from 3/12/18-4/27/18 for treatment of recurrent early stage oral SCC.\par \par 11/5/18-Follow up\par At his last visit he complained of epigastric burning and dysphagia. \par PET/CT showed some streak uptake in soft palate with plan to repeat  PET/CT in 3 months.  He f/u with SLP 10/16/18 for dysphagia therapy. He also followed up with Dr. Wilson 9/18/18 and complained of lower chest discomfort upon eating. Barium swallow showed no evidence of esophageal dysmotility, no mass, stricture or other esophageal abnormality was seen.\par \par PET/CT 10/25/18\par IMPRESSION: \par 1. Only mild residual activity along the left side the hard palate, unchanged since 7/25/2018. Faint activity previously seen along the right oropharyngeal wall is no longer present. No regional or distal hypermetabolic lymphadenopathy. \par 2. Continued low-level activity within an area of atelectasis versus scarring in the left upper lobe lingula (maximum SUV 2.8), relatively unchanged since 7/25/2018 however moderately improved/reduced since 3/24/2016. \par \par He was seen by Flaquito Arellano on 10/16/18: "patient underwent a repeat modified barium swallow study on 9/18/18.  Exam showed a mild pharyngeal dysphagia with penetration on cup sips of thin liquid with trace silent aspiration on initial trial, as well as mild pharyngeal residue after the swallow.  Patient recommended to continue on mechanical soft solid diet with thin liquids with periodic throat clearing to reduce risk of aspiration and continuation of dysphagia therapy."\par \par Today he states that he feels generally well. He states that he continues to have intermittent epigastric pain with swallowing various foods. He eats mainly soft textured foods. He notes dry mouth for which he uses bicarbonate rinses . He does H&N exercises. He has followed up with his PMD and has been seeing his dentist regularly. He does not want to use a PPI or see GI for further workup of his epigastric discomfort.\par \par \par 8/2/18\par Mr. Fer Franklin presents today for routine follow up and to review PET/CT results. 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 He was found to have recurrence in the Rt soft palate, Opx 1/2018, and completed 7000 cGy to the oropharynx from 3/12/18-4/27/18 for treatment of recurrent early stage oral SCC.\par \par He last saw us 7/10/18 and was CHEY on exam. He was advised to resume PPI for GERD symptoms, discontinue gabapentin and continue dental care and rinses for dry mouth\par \par PET scan on 7/25/18 showed improvement in right oropharyngeal and hard palate abnormality. There were small residual foci of increased metabolic activity remaining in the right lateral oropharynx and on the left side of the hard palate. There was a decrease in the size of a left lung nodule associated with bronchiectasis and scarring, probably representing post inflammatory change. No change in a few sclerotic bone lesions. \par \par Today he feels generally well. He notes burning epigastric pain with swallowing 3/10. He has f/u with SLP for dysphagia with recommendation to continue H&N/swallowing exercises with plan to do modified barium swallow in no improvement at next f/u in 4-6 weeks. he has also f/u with Dr. Wilson and was found to be doing well.\par \par \par ONCOLOGY HISTORY\par Mr. 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 He now returns with a NEW biopsy proven SCC of the Right soft palate, hard palate, and RMT that is outside of prior radiation field.\par \par He is s/p concurrent radiation therapy with chemotherapy completed 6/23/16 with curative intent. Treatment was directed at the supraglottic larynx, vallecula/tongue base, and bilateral necks. Chemotherapy was weekly cisplatin given by Dr. Dowell.\par \par Mr. 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 Wilson. Pathology revealed infiltrating squamous cell carcinoma, depth of invasion 2mm, arising in ulcerated inflamed mucosa with high grade dysplasia.  The inferior margin showed squamous mucosa, positive for high grade dysplasia. Anterior margin showed focally disrupted squamous mucosa negative for dysplasia, and dislodged fragment of high grade dysplasia. Second right oropharynx showed at least high grade dysplasia, suspicious for superficial invasion, and the tumor of the right soft palate showed high grade dysplasia, with brisk underlying lymphoplasmacytic infiltrate. \par \par Mr. Franklin presents today for further consideration for radiation therapy. He still has significant throat pain after surgery. He is not eating and drinking well because of this. He is eating mostly papaya, and notes he has lost 10 pounds since surgery as judged by his home scale. He has a slight cough. His energy levels are significantly decreased, and he has felt like he has a fever on and off. He is not taking tylenol despite significant pain at the surgical site.\par \par He continues with head and neck exercises, and sees a dentist every 4 months. He is not using flouride toothpaste. \par He has dry mouth as well. \par \par TREATMENT SUMMARY: \par Treatment Site: Larynx, Oropharynx, Necks \par Total dose 7,000cGy / 35 fractions with dose-painting\par Treatment Dates: 5/05/2016 to 6/23/2016\par \par He did not have any unexpected treatment complications during the course of treatment.  He tolerated treatment exceptionally well and did not require narcotic pain medications. He maintained an adequate weight and did not have any breaks in radiation or chemotherapy. \par \par ONCOLOGIC HISTORY: \par Mr. 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 He was monitored every 3 months in [place] by Dr. Álvarez and also in [place] by Dr. Bao Sanders. His visit to Dr. Leon in February 2016 was notable for a concerning lesion arising at the right vallecula/right lateral pharyngeal wall. This was separate from the previously treated lesion in the left AE fold that had been removed by laser. On March 2, 2016, Dr. Leon biopsied the right vallecula which showed squamous proliferative papillary lesion with high-grade dysplasia, and the left aryepiglottic fold which showed squamous cell carcinoma. Pathology review at [facility] confirmed the diagnosis of SCC in the left AE fold and squamous dysplasia with high-grade features in the right vallecula. PET/CT scan showed a maximum SUV of 9.2 in the right vallecular area as well as a 1.6 cm left cervical lymph node with maximum SUV of 9.8. FNA of the left neck node on 4/7/16 showed SCC.  \par \par 1/29/18- Mr. 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 He now returns with a NEW biopsy proven SCC of the Right soft palate, hard palate, and RMT that is outside of prior radiation field.\par He is s/p concurrent radiation therapy with chemotherapy completed 6/23/16 with curative intent. Treatment was directed at the supraglottic larynx, vallecula/tongue base, and bilateral necks. Chemotherapy was weekly cisplatin given by Dr. Dowell.\par \par Mr. Franklin underwent biopsy of Rt. retromolar trigone erythroleukoplakia on 12/15/17, which revealed severe dysplaia with focal ulceration, extending up to biopsy margins.\par On 1/23/18, the patient underwent biopsy of right soft palate, right oropharynx, and right piriform sinus with Dr. Wilson. Pathology revealed infiltrating squamous cell carcinoma, depth of invasion 2mm, arising in ulcerated inflamed mucosa with high grade dysplasia.  The inferior margin showed squamous mucosa, positive for high grade dysplasia. Anterior margin showed focally disrupted squamous mucosa negative for dysplasia, and dislodged fragment of high grade dysplasia. Second right oropharynx showed at least high grade dysplasia, suspicious for superficial invasion, and the tumor of the right soft palate showed high grade dysplasia, with brisk underlying lymphoplasmacytic infiltrate. \par \par At the time of initial re-consult for radiation therapy Mr. Franklin still had significant throat pain after surgery.\par \par 5/22/18- Mr. Fer Franklin presents today for post treatment evaluation.\par His weight is up two pounds from his last week of treatment today. Appetite is good, however he needs to make a conscious effort to eat and swallow. Most of his mouth has healed well, however he feels he still has pain to his right lower mouth. \par He has trouble swallowing dry things, and needs to sip extra water when he eats them. Taste sensation intact. Not using prevident because it burns too much. DOing head and neck exercises, and planning to make an appointment with flaquito sanchez speech and swallow. Energy is good. Thick saliva is improving. \par \par 7/10/18- Mr. Franklin feels well. Notes some discomfort with swallowing. Started thyroid medication, following with speech and swallow. Able to swallow liquids, trouble with dry foods.

## 2018-11-06 NOTE — PROCEDURE
[Lesion] : lesion identified by mirror examination needing further evaluation [Dysphagia] : dysphagia not clearly evaluated by indirect laryngoscopy [Surveillance] : monitor for disease recurrence [Topical Lidocaine] : topical lidocaine [Flexible Endoscope] : examined with the flexible endoscope [Photographs Taken] : photographs taken [Normal] : the true vocal cords ~T were normal [de-identified] : scar tissue right tonsil bed.  [de-identified] : epiglottis somewhat effaced

## 2018-11-07 ENCOUNTER — APPOINTMENT (OUTPATIENT)
Dept: OTOLARYNGOLOGY | Facility: CLINIC | Age: 78
End: 2018-11-07
Payer: MEDICARE

## 2018-11-07 VITALS
DIASTOLIC BLOOD PRESSURE: 60 MMHG | SYSTOLIC BLOOD PRESSURE: 128 MMHG | WEIGHT: 138 LBS | HEART RATE: 64 BPM | BODY MASS INDEX: 18.69 KG/M2 | HEIGHT: 72 IN

## 2018-11-07 PROCEDURE — 31575 DIAGNOSTIC LARYNGOSCOPY: CPT

## 2018-11-07 PROCEDURE — 99214 OFFICE O/P EST MOD 30 MIN: CPT | Mod: 25

## 2019-04-09 ENCOUNTER — APPOINTMENT (OUTPATIENT)
Dept: OTOLARYNGOLOGY | Facility: CLINIC | Age: 79
End: 2019-04-09
Payer: MEDICARE

## 2019-04-09 PROCEDURE — 31575 DIAGNOSTIC LARYNGOSCOPY: CPT

## 2019-04-09 PROCEDURE — 99214 OFFICE O/P EST MOD 30 MIN: CPT | Mod: 25

## 2019-04-22 ENCOUNTER — FORM ENCOUNTER (OUTPATIENT)
Age: 79
End: 2019-04-22

## 2019-04-23 ENCOUNTER — OUTPATIENT (OUTPATIENT)
Dept: OUTPATIENT SERVICES | Facility: HOSPITAL | Age: 79
LOS: 1 days | End: 2019-04-23
Payer: MEDICARE

## 2019-04-23 ENCOUNTER — APPOINTMENT (OUTPATIENT)
Dept: CT IMAGING | Facility: HOSPITAL | Age: 79
End: 2019-04-23
Payer: MEDICARE

## 2019-04-23 DIAGNOSIS — Z90.89 ACQUIRED ABSENCE OF OTHER ORGANS: Chronic | ICD-10-CM

## 2019-04-23 DIAGNOSIS — Z41.9 ENCOUNTER FOR PROCEDURE FOR PURPOSES OTHER THAN REMEDYING HEALTH STATE, UNSPECIFIED: Chronic | ICD-10-CM

## 2019-04-23 DIAGNOSIS — Z90.49 ACQUIRED ABSENCE OF OTHER SPECIFIED PARTS OF DIGESTIVE TRACT: Chronic | ICD-10-CM

## 2019-04-23 DIAGNOSIS — C32.3 MALIGNANT NEOPLASM OF LARYNGEAL CARTILAGE: Chronic | ICD-10-CM

## 2019-04-23 PROCEDURE — 70491 CT SOFT TISSUE NECK W/DYE: CPT

## 2019-04-23 PROCEDURE — 70491 CT SOFT TISSUE NECK W/DYE: CPT | Mod: 26

## 2019-05-02 ENCOUNTER — APPOINTMENT (OUTPATIENT)
Dept: RADIATION ONCOLOGY | Facility: CLINIC | Age: 79
End: 2019-05-02
Payer: MEDICARE

## 2019-05-02 VITALS
DIASTOLIC BLOOD PRESSURE: 76 MMHG | SYSTOLIC BLOOD PRESSURE: 129 MMHG | RESPIRATION RATE: 16 BRPM | WEIGHT: 138.5 LBS | OXYGEN SATURATION: 100 % | BODY MASS INDEX: 18.78 KG/M2 | HEART RATE: 64 BPM

## 2019-05-02 PROCEDURE — 99214 OFFICE O/P EST MOD 30 MIN: CPT

## 2019-05-02 NOTE — VITALS
[ECOG Performance Status: 1 - Restricted in physically strenuous activity but ambulatory and able to carry out work of a light or sedentary nature] : Performance Status: 1 - Restricted in physically strenuous activity but ambulatory and able to carry out work of a light or sedentary nature, e.g., light house work, office work [90: Able to carry normal activity; minor signs or symptoms of disease.] : 90: Able to carry normal activity; minor signs or symptoms of disease.

## 2019-05-02 NOTE — DATA REVIEWED
[No studies available for review at this time.] : No studies available for review at this time. [FreeTextEntry1] : I have personally reviewed all relevant imaging studies (CT) and I have discussed the case with the referring physician.\par

## 2019-05-02 NOTE — PROCEDURE
[Lesion] : lesion identified by mirror examination needing further evaluation [Dysphagia] : dysphagia not clearly evaluated by indirect laryngoscopy [Surveillance] : monitor for disease recurrence [Flexible Endoscope] : examined with the flexible endoscope [Topical Lidocaine] : topical lidocaine [Photographs Taken] : photographs taken [Normal] : the true vocal cords ~T were normal [de-identified] : scar tissue right tonsil bed.  [de-identified] : epiglottis somewhat effaced

## 2019-05-02 NOTE — PHYSICAL EXAM
[Examination Of The Oral Cavity] : the lips and gums were normal [Normal Oral Cavity] : oral cavity was normal [Oriented To Time, Place, And Person] : oriented to person, place, and time [Heart Sounds] : normal S1 and S2 [Normal] : normoactive bowel sounds, soft and nontender, no hepatosplenomegaly or masses appreciated [de-identified] : Scarring in right soft palate//tonsillar area at prior tumor location. No masses or lesions or bleeding.  [de-identified] : fibrosis to B/L neck, worse than on prior exam.

## 2019-05-02 NOTE — REVIEW OF SYSTEMS
[Dysphagia] : dysphagia [Negative] : Constitutional [Mucosal Pain] : no mucosal pain [Skin Rash] : no skin rash [FreeTextEntry5] : as noted in HPI

## 2019-08-13 ENCOUNTER — APPOINTMENT (OUTPATIENT)
Dept: OTOLARYNGOLOGY | Facility: CLINIC | Age: 79
End: 2019-08-13
Payer: MEDICARE

## 2019-08-13 PROCEDURE — 31575 DIAGNOSTIC LARYNGOSCOPY: CPT

## 2019-08-13 PROCEDURE — 99214 OFFICE O/P EST MOD 30 MIN: CPT | Mod: 25

## 2019-11-12 ENCOUNTER — APPOINTMENT (OUTPATIENT)
Dept: OTOLARYNGOLOGY | Facility: CLINIC | Age: 79
End: 2019-11-12
Payer: MEDICARE

## 2019-11-12 PROCEDURE — 31575 DIAGNOSTIC LARYNGOSCOPY: CPT

## 2019-11-12 PROCEDURE — 99214 OFFICE O/P EST MOD 30 MIN: CPT | Mod: 25

## 2019-11-13 NOTE — VITALS
[90: Able to carry normal activity; minor signs or symptoms of disease.] : 90: Able to carry normal activity; minor signs or symptoms of disease.  [ECOG Performance Status: 1 - Restricted in physically strenuous activity but ambulatory and able to carry out work of a light or sedentary nature] : Performance Status: 1 - Restricted in physically strenuous activity but ambulatory and able to carry out work of a light or sedentary nature, e.g., light house work, office work

## 2019-11-14 ENCOUNTER — APPOINTMENT (OUTPATIENT)
Dept: RADIATION ONCOLOGY | Facility: CLINIC | Age: 79
End: 2019-11-14
Payer: MEDICARE

## 2019-11-14 VITALS
DIASTOLIC BLOOD PRESSURE: 74 MMHG | BODY MASS INDEX: 18.97 KG/M2 | SYSTOLIC BLOOD PRESSURE: 140 MMHG | HEART RATE: 62 BPM | WEIGHT: 139.9 LBS | RESPIRATION RATE: 16 BRPM | OXYGEN SATURATION: 100 %

## 2019-11-14 DIAGNOSIS — Z78.9 OTHER SPECIFIED HEALTH STATUS: ICD-10-CM

## 2019-11-14 DIAGNOSIS — Z86.19 PERSONAL HISTORY OF OTHER INFECTIOUS AND PARASITIC DISEASES: ICD-10-CM

## 2019-11-14 DIAGNOSIS — Z87.898 PERSONAL HISTORY OF OTHER SPECIFIED CONDITIONS: ICD-10-CM

## 2019-11-14 PROCEDURE — 99214 OFFICE O/P EST MOD 30 MIN: CPT | Mod: 25

## 2019-11-14 PROCEDURE — 31575 DIAGNOSTIC LARYNGOSCOPY: CPT

## 2019-11-14 NOTE — HISTORY OF PRESENT ILLNESS
[FreeTextEntry1] : Mr. 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 He was found to have recurrence in the Rt soft palate, Opx 1/2018, and completed 7000 cGy to the oropharynx from 3/12/18-4/27/18 for treatment of recurrent early stage oral SCC.\par \par 11/14/19 - Follow-up \par Mr. Franklin returns today for routine follow-up.  He was last seen on 5/2/19.  Plan was for xerostomia strategies, head and neck exercises, OT for lymphedema therapy, continue f/u with dental and PMD, f/u SLP for any worsening symptoms, f/u with GI for endoscopies PRN.  He was also advised on alcohol reduction and routine health maintenance.  He last saw dental in July, will follow-up again next week.  He last saw his PMD one week ago.  Today, he reports feeling well.  He notes stable dysphagia and odynophagia with dry foods and stable xerostomia.  He denies other complaints to include pain, dyspnea, chest pain and palpitations.  He does see Namrata Vicente OT for lymphedema therapy.  He denies smoking.  Drinks a glass of wine with dinner most nights.  \par Will be traveling to [place] for winter.\par \par 5/2/19 - Follow-up\par Mr. Franklin returns today for routine follow-up.  He was last seen here on 11/5/18.  Plan at that time was for repeat CT neck in 4 months, f/u SLP, f/u OT for lymphedema treatment, f/u with dental.  Self-care measures (xerostomia management, head and neck exercises) also reviewed at that time.  He spent most of the winter in [place] and is now back in town. \par \par CT neck with contrast 4/23/19 - Impression: No evidence of local or regional radha recurrence of squamous cell carcinoma.  \par \par He last saw SLP in October 2018. He last saw dental in April.  He last saw Dr. Wilson on 4/9/19.  He reports having been started on synthroid by his PMD approximately 6 months ago.  He is scheduled to follow-up with PMD in 1 week.  Today, he reports feeling well.  He continues to have xerostomia.  He is using baking soda rinses with little relief.  He also reports dysphagia and odynophagia that he relates to xerostomia, worst with dry foods.  He states he is eating 3 full meals per day, but tries to eat soft foods that are easier to swallow.  He states he has stopped doing head and neck exercises. He drinks 1-2 glasses of wine most nights with dinner. He feels his GI complaints (dry food getting stuck in lower neck) are stable and he does not need GI follow up. Has used PPI in past with no relief. Has a GI doc locally who has done endoscopies 3 years ago. \par \par \par 11/5/18-Follow up\par At his last visit he complained of epigastric burning and dysphagia. \par PET/CT showed some streak uptake in soft palate with plan to repeat  PET/CT in 3 months.  He f/u with SLP 10/16/18 for dysphagia therapy. He also followed up with Dr. Wilson 9/18/18 and complained of lower chest discomfort upon eating. Barium swallow showed no evidence of esophageal dysmotility, no mass, stricture or other esophageal abnormality was seen.\par \par PET/CT 10/25/18\par IMPRESSION: \par 1. Only mild residual activity along the left side the hard palate, unchanged since 7/25/2018. Faint activity previously seen along the right oropharyngeal wall is no longer present. No regional or distal hypermetabolic lymphadenopathy. \par 2. Continued low-level activity within an area of atelectasis versus scarring in the left upper lobe lingula (maximum SUV 2.8), relatively unchanged since 7/25/2018 however moderately improved/reduced since 3/24/2016. \par \par He was seen by Flaquito Arellano on 10/16/18: "patient underwent a repeat modified barium swallow study on 9/18/18.  Exam showed a mild pharyngeal dysphagia with penetration on cup sips of thin liquid with trace silent aspiration on initial trial, as well as mild pharyngeal residue after the swallow.  Patient recommended to continue on mechanical soft solid diet with thin liquids with periodic throat clearing to reduce risk of aspiration and continuation of dysphagia therapy."\par \par Today he states that he feels generally well. He states that he continues to have intermittent epigastric pain with swallowing various foods. He eats mainly soft textured foods. He notes dry mouth for which he uses bicarbonate rinses . He does H&N exercises. He has followed up with his PMD and has been seeing his dentist regularly. He does not want to use a PPI or see GI for further workup of his epigastric discomfort.\par \par \par 8/2/18\par Mr. Fer Franklin presents today for routine follow up and to review PET/CT results. 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 He was found to have recurrence in the Rt soft palate, Opx 1/2018, and completed 7000 cGy to the oropharynx from 3/12/18-4/27/18 for treatment of recurrent early stage oral SCC.\par \par He last saw us 7/10/18 and was CHEY on exam. He was advised to resume PPI for GERD symptoms, discontinue gabapentin and continue dental care and rinses for dry mouth\par \par PET scan on 7/25/18 showed improvement in right oropharyngeal and hard palate abnormality. There were small residual foci of increased metabolic activity remaining in the right lateral oropharynx and on the left side of the hard palate. There was a decrease in the size of a left lung nodule associated with bronchiectasis and scarring, probably representing post inflammatory change. No change in a few sclerotic bone lesions. \par \par Today he feels generally well. He notes burning epigastric pain with swallowing 3/10. He has f/u with SLP for dysphagia with recommendation to continue H&N/swallowing exercises with plan to do modified barium swallow in no improvement at next f/u in 4-6 weeks. he has also f/u with Dr. Wilson and was found to be doing well.\par \par \par ONCOLOGY HISTORY\par Mr. 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 He now returns with a NEW biopsy proven SCC of the Right soft palate, hard palate, and RMT that is outside of prior radiation field.\par \par He is s/p concurrent radiation therapy with chemotherapy completed 6/23/16 with curative intent. Treatment was directed at the supraglottic larynx, vallecula/tongue base, and bilateral necks. Chemotherapy was weekly cisplatin given by Dr. Dowell.\par \par Mr. 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 Wilson. Pathology revealed infiltrating squamous cell carcinoma, depth of invasion 2mm, arising in ulcerated inflamed mucosa with high grade dysplasia.  The inferior margin showed squamous mucosa, positive for high grade dysplasia. Anterior margin showed focally disrupted squamous mucosa negative for dysplasia, and dislodged fragment of high grade dysplasia. Second right oropharynx showed at least high grade dysplasia, suspicious for superficial invasion, and the tumor of the right soft palate showed high grade dysplasia, with brisk underlying lymphoplasmacytic infiltrate. \par \par Mr. Franklin presents today for further consideration for radiation therapy. He still has significant throat pain after surgery. He is not eating and drinking well because of this. He is eating mostly papaya, and notes he has lost 10 pounds since surgery as judged by his home scale. He has a slight cough. His energy levels are significantly decreased, and he has felt like he has a fever on and off. He is not taking tylenol despite significant pain at the surgical site.\par \par He continues with head and neck exercises, and sees a dentist every 4 months. He is not using flouride toothpaste. \par He has dry mouth as well. \par \par TREATMENT SUMMARY: \par Treatment Site: Larynx, Oropharynx, Necks \par Total dose 7,000cGy / 35 fractions with dose-painting\par Treatment Dates: 5/05/2016 to 6/23/2016\par \par He did not have any unexpected treatment complications during the course of treatment.  He tolerated treatment exceptionally well and did not require narcotic pain medications. He maintained an adequate weight and did not have any breaks in radiation or chemotherapy. \par \par ONCOLOGIC HISTORY: \par Mr. 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 He was monitored every 3 months in [place] by Dr. Álvarez and also in [place] by Dr. Bao Sanders. His visit to Dr. Leon in February 2016 was notable for a concerning lesion arising at the right vallecula/right lateral pharyngeal wall. This was separate from the previously treated lesion in the left AE fold that had been removed by laser. On March 2, 2016, Dr. Leon biopsied the right vallecula which showed squamous proliferative papillary lesion with high-grade dysplasia, and the left aryepiglottic fold which showed squamous cell carcinoma. Pathology review at [facility] confirmed the diagnosis of SCC in the left AE fold and squamous dysplasia with high-grade features in the right vallecula. PET/CT scan showed a maximum SUV of 9.2 in the right vallecular area as well as a 1.6 cm left cervical lymph node with maximum SUV of 9.8. FNA of the left neck node on 4/7/16 showed SCC.  \par \par 1/29/18- Mr. 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 He now returns with a NEW biopsy proven SCC of the Right soft palate, hard palate, and RMT that is outside of prior radiation field.\par He is s/p concurrent radiation therapy with chemotherapy completed 6/23/16 with curative intent. Treatment was directed at the supraglottic larynx, vallecula/tongue base, and bilateral necks. Chemotherapy was weekly cisplatin given by Dr. Dowell.\par \par Mr. Franklin underwent biopsy of Rt. retromolar trigone erythroleukoplakia on 12/15/17, which revealed severe dysplaia with focal ulceration, extending up to biopsy margins.\par On 1/23/18, the patient underwent biopsy of right soft palate, right oropharynx, and right piriform sinus with Dr. Wilson. Pathology revealed infiltrating squamous cell carcinoma, depth of invasion 2mm, arising in ulcerated inflamed mucosa with high grade dysplasia.  The inferior margin showed squamous mucosa, positive for high grade dysplasia. Anterior margin showed focally disrupted squamous mucosa negative for dysplasia, and dislodged fragment of high grade dysplasia. Second right oropharynx showed at least high grade dysplasia, suspicious for superficial invasion, and the tumor of the right soft palate showed high grade dysplasia, with brisk underlying lymphoplasmacytic infiltrate. \par \par At the time of initial re-consult for radiation therapy Mr. Franklin still had significant throat pain after surgery.\par \par 5/22/18- Mr. Fer Franklin presents today for post treatment evaluation.\par His weight is up two pounds from his last week of treatment today. Appetite is good, however he needs to make a conscious effort to eat and swallow. Most of his mouth has healed well, however he feels he still has pain to his right lower mouth. \par He has trouble swallowing dry things, and needs to sip extra water when he eats them. Taste sensation intact. Not using prevident because it burns too much. DOing head and neck exercises, and planning to make an appointment with flaquito sanchez speech and swallow. Energy is good. Thick saliva is improving. \par \par 7/10/18- Mr. Franklin feels well. Notes some discomfort with swallowing. Started thyroid medication, following with speech and swallow. Able to swallow liquids, trouble with dry foods.

## 2019-11-14 NOTE — DATA REVIEWED
[No studies available for review at this time.] : No studies available for review at this time. [FreeTextEntry1] : I have personally reviewed all relevant imaging studies (CT)

## 2019-11-14 NOTE — PROCEDURE
[Hoarseness] : hoarseness not clearly evaluated by indirect laryngoscopy [Dysphagia] : dysphagia not clearly evaluated by indirect laryngoscopy [Surveillance] : monitor for disease recurrence [Topical Lidocaine] : topical lidocaine [Flexible Endoscope] : examined with the flexible endoscope [Normal] : the true vocal cords ~T were normal [de-identified] : Scarring bilat BOT. No masses. +Telangiectasias

## 2019-11-14 NOTE — PHYSICAL EXAM
[Examination Of The Oral Cavity] : the lips and gums were normal [Normal Oral Cavity] : oral cavity was normal [Heart Sounds] : normal S1 and S2 [Oriented To Time, Place, And Person] : oriented to person, place, and time [Normal] : no focal deficits [de-identified] : Scarring in right soft palate//tonsillar area at prior tumor location. No masses or lesions or bleeding.  [de-identified] : fibrosis to B/L neck, stable since prior.

## 2020-04-28 ENCOUNTER — APPOINTMENT (OUTPATIENT)
Dept: RADIATION ONCOLOGY | Facility: CLINIC | Age: 80
End: 2020-04-28
Payer: MEDICARE

## 2020-04-28 PROCEDURE — G2012 BRIEF CHECK IN BY MD/QHP: CPT

## 2020-04-28 NOTE — HISTORY OF PRESENT ILLNESS
[Home] : at home, [unfilled] , at the time of the visit. [Medical Office: ([facility])___] : at the medical office located in  [Patient] : the patient [FreeTextEntry1] : Mr. 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 He was found to have recurrence in the Rt soft palate, Opx 1/2018, and completed 7000 cGy to the oropharynx from 3/12/18-4/27/18 for treatment of recurrent early stage oral SCC.\par \par 4/28/20 - Follow-up by Telephone due to COVID-19 (Patient has no video capability)\par Mr. Franklin is being seen via telehealth for routine follow-up.  He was last seen on 11/14/19.  Plan was for CT neck and chest in April, continue follow-up with dental, PMD, and Dr. Álvarez, continue PreviDent, head and neck exercises, alcohol reduction.  \par He was seen by his ENT in [place] in February who examined him and said he was cancer-free. He was given a report to send to us. \par Today, he feels stable. Throat is not getting worse. He also recently cancelled an in-person appt with Dr. Wilson. He continues to do his H&N exercises. He last saw Namrata Vicente OT in November.\par \par 11/14/19 - Follow-up \par Mr. Franklin returns today for routine follow-up.  He was last seen on 5/2/19.  Plan was for xerostomia strategies, head and neck exercises, OT for lymphedema therapy, continue f/u with dental and PMD, f/u SLP for any worsening symptoms, f/u with GI for endoscopies PRN.  He was also advised on alcohol reduction and routine health maintenance.  He last saw dental in July, will follow-up again next week.  He last saw his PMD one week ago.  Today, he reports feeling well.  He notes stable dysphagia and odynophagia with dry foods and stable xerostomia.  He denies other complaints to include pain, dyspnea, chest pain and palpitations.  He does see Namrata Vicente OT for lymphedema therapy.  He denies smoking.  Drinks a glass of wine with dinner most nights.  \par Will be traveling to [place] for winter.\par \par 5/2/19 - Follow-up\par Mr. Franklin returns today for routine follow-up.  He was last seen here on 11/5/18.  Plan at that time was for repeat CT neck in 4 months, f/u SLP, f/u OT for lymphedema treatment, f/u with dental.  Self-care measures (xerostomia management, head and neck exercises) also reviewed at that time.  He spent most of the winter in [place] and is now back in town. \par \par CT neck with contrast 4/23/19 - Impression: No evidence of local or regional radha recurrence of squamous cell carcinoma.  \par \par He last saw SLP in October 2018. He last saw dental in April.  He last saw Dr. Wilson on 4/9/19.  He reports having been started on synthroid by his PMD approximately 6 months ago.  He is scheduled to follow-up with PMD in 1 week.  Today, he reports feeling well.  He continues to have xerostomia.  He is using baking soda rinses with little relief.  He also reports dysphagia and odynophagia that he relates to xerostomia, worst with dry foods.  He states he is eating 3 full meals per day, but tries to eat soft foods that are easier to swallow.  He states he has stopped doing head and neck exercises. He drinks 1-2 glasses of wine most nights with dinner. He feels his GI complaints (dry food getting stuck in lower neck) are stable and he does not need GI follow up. Has used PPI in past with no relief. Has a GI doc locally who has done endoscopies 3 years ago. \par \par \par 11/5/18-Follow up\par At his last visit he complained of epigastric burning and dysphagia. \par PET/CT showed some streak uptake in soft palate with plan to repeat  PET/CT in 3 months.  He f/u with SLP 10/16/18 for dysphagia therapy. He also followed up with Dr. Wilson 9/18/18 and complained of lower chest discomfort upon eating. Barium swallow showed no evidence of esophageal dysmotility, no mass, stricture or other esophageal abnormality was seen.\par \par PET/CT 10/25/18\par IMPRESSION: \par 1. Only mild residual activity along the left side the hard palate, unchanged since 7/25/2018. Faint activity previously seen along the right oropharyngeal wall is no longer present. No regional or distal hypermetabolic lymphadenopathy. \par 2. Continued low-level activity within an area of atelectasis versus scarring in the left upper lobe lingula (maximum SUV 2.8), relatively unchanged since 7/25/2018 however moderately improved/reduced since 3/24/2016. \par \par He was seen by Sang Arellano on 10/16/18: "patient underwent a repeat modified barium swallow study on 9/18/18.  Exam showed a mild pharyngeal dysphagia with penetration on cup sips of thin liquid with trace silent aspiration on initial trial, as well as mild pharyngeal residue after the swallow.  Patient recommended to continue on mechanical soft solid diet with thin liquids with periodic throat clearing to reduce risk of aspiration and continuation of dysphagia therapy."\par \par Today he states that he feels generally well. He states that he continues to have intermittent epigastric pain with swallowing various foods. He eats mainly soft textured foods. He notes dry mouth for which he uses bicarbonate rinses . He does H&N exercises. He has followed up with his PMD and has been seeing his dentist regularly. He does not want to use a PPI or see GI for further workup of his epigastric discomfort.\par \par \par 8/2/18\par Mr. Fer Franklin presents today for routine follow up and to review PET/CT results. 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 He was found to have recurrence in the Rt soft palate, Opx 1/2018, and completed 7000 cGy to the oropharynx from 3/12/18-4/27/18 for treatment of recurrent early stage oral SCC.\par \par He last saw us 7/10/18 and was CHEY on exam. He was advised to resume PPI for GERD symptoms, discontinue gabapentin and continue dental care and rinses for dry mouth\par \par PET scan on 7/25/18 showed improvement in right oropharyngeal and hard palate abnormality. There were small residual foci of increased metabolic activity remaining in the right lateral oropharynx and on the left side of the hard palate. There was a decrease in the size of a left lung nodule associated with bronchiectasis and scarring, probably representing post inflammatory change. No change in a few sclerotic bone lesions. \par \par Today he feels generally well. He notes burning epigastric pain with swallowing 3/10. He has f/u with SLP for dysphagia with recommendation to continue H&N/swallowing exercises with plan to do modified barium swallow in no improvement at next f/u in 4-6 weeks. he has also f/u with Dr. Wilson and was found to be doing well.\par \par \par ONCOLOGY HISTORY\par Mr. 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 He now returns with a NEW biopsy proven SCC of the Right soft palate, hard palate, and RMT that is outside of prior radiation field.\par \par He is s/p concurrent radiation therapy with chemotherapy completed 6/23/16 with curative intent. Treatment was directed at the supraglottic larynx, vallecula/tongue base, and bilateral necks. Chemotherapy was weekly cisplatin given by Dr. Dowell.\par \par Mr. 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 Wilson. Pathology revealed infiltrating squamous cell carcinoma, depth of invasion 2mm, arising in ulcerated inflamed mucosa with high grade dysplasia.  The inferior margin showed squamous mucosa, positive for high grade dysplasia. Anterior margin showed focally disrupted squamous mucosa negative for dysplasia, and dislodged fragment of high grade dysplasia. Second right oropharynx showed at least high grade dysplasia, suspicious for superficial invasion, and the tumor of the right soft palate showed high grade dysplasia, with brisk underlying lymphoplasmacytic infiltrate. \par \par Mr. Franklin presents today for further consideration for radiation therapy. He still has significant throat pain after surgery. He is not eating and drinking well because of this. He is eating mostly papaya, and notes he has lost 10 pounds since surgery as judged by his home scale. He has a slight cough. His energy levels are significantly decreased, and he has felt like he has a fever on and off. He is not taking tylenol despite significant pain at the surgical site.\par \par He continues with head and neck exercises, and sees a dentist every 4 months. He is not using flouride toothpaste. \par He has dry mouth as well. \par \par TREATMENT SUMMARY: \par Treatment Site: Larynx, Oropharynx, Necks \par Total dose 7,000cGy / 35 fractions with dose-painting\par Treatment Dates: 5/05/2016 to 6/23/2016\par \par He did not have any unexpected treatment complications during the course of treatment.  He tolerated treatment exceptionally well and did not require narcotic pain medications. He maintained an adequate weight and did not have any breaks in radiation or chemotherapy. \par \par ONCOLOGIC HISTORY: \par Mr. 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 He was monitored every 3 months in [place] by Dr. Álvarez and also in [place] by Dr. Bao Sanders. His visit to Dr. Leon in February 2016 was notable for a concerning lesion arising at the right vallecula/right lateral pharyngeal wall. This was separate from the previously treated lesion in the left AE fold that had been removed by laser. On March 2, 2016, Dr. Leon biopsied the right vallecula which showed squamous proliferative papillary lesion with high-grade dysplasia, and the left aryepiglottic fold which showed squamous cell carcinoma. Pathology review at [facility] confirmed the diagnosis of SCC in the left AE fold and squamous dysplasia with high-grade features in the right vallecula. PET/CT scan showed a maximum SUV of 9.2 in the right vallecular area as well as a 1.6 cm left cervical lymph node with maximum SUV of 9.8. FNA of the left neck node on 4/7/16 showed SCC.  \par \par 1/29/18- Mr. 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 He now returns with a NEW biopsy proven SCC of the Right soft palate, hard palate, and RMT that is outside of prior radiation field.\par He is s/p concurrent radiation therapy with chemotherapy completed 6/23/16 with curative intent. Treatment was directed at the supraglottic larynx, vallecula/tongue base, and bilateral necks. Chemotherapy was weekly cisplatin given by Dr. Dowell.\par \par Mr. Franklin underwent biopsy of Rt. retromolar trigone erythroleukoplakia on 12/15/17, which revealed severe dysplaia with focal ulceration, extending up to biopsy margins.\par On 1/23/18, the patient underwent biopsy of right soft palate, right oropharynx, and right piriform sinus with Dr. Wilson. Pathology revealed infiltrating squamous cell carcinoma, depth of invasion 2mm, arising in ulcerated inflamed mucosa with high grade dysplasia.  The inferior margin showed squamous mucosa, positive for high grade dysplasia. Anterior margin showed focally disrupted squamous mucosa negative for dysplasia, and dislodged fragment of high grade dysplasia. Second right oropharynx showed at least high grade dysplasia, suspicious for superficial invasion, and the tumor of the right soft palate showed high grade dysplasia, with brisk underlying lymphoplasmacytic infiltrate. \par \par At the time of initial re-consult for radiation therapy Mr. Franklin still had significant throat pain after surgery.\par \par 5/22/18- Mr. Fer Franklin presents today for post treatment evaluation.\par His weight is up two pounds from his last week of treatment today. Appetite is good, however he needs to make a conscious effort to eat and swallow. Most of his mouth has healed well, however he feels he still has pain to his right lower mouth. \par He has trouble swallowing dry things, and needs to sip extra water when he eats them. Taste sensation intact. Not using prevident because it burns too much. DOing head and neck exercises, and planning to make an appointment with [facility] speech and swallow. Energy is good. Thick saliva is improving. \par \par 7/10/18- Mr. Franklin feels well. Notes some discomfort with swallowing. Started thyroid medication, following with speech and swallow. Able to swallow liquids, trouble with dry foods.

## 2020-04-28 NOTE — PROCEDURE
[Hoarseness] : hoarseness not clearly evaluated by indirect laryngoscopy [Surveillance] : monitor for disease recurrence [Dysphagia] : dysphagia not clearly evaluated by indirect laryngoscopy [Topical Lidocaine] : topical lidocaine [Flexible Endoscope] : examined with the flexible endoscope [Normal] : the true vocal cords ~T were normal [de-identified] : Scarring bilat BOT. No masses. +Telangiectasias

## 2020-06-03 ENCOUNTER — APPOINTMENT (OUTPATIENT)
Dept: OTOLARYNGOLOGY | Facility: CLINIC | Age: 80
End: 2020-06-03
Payer: MEDICARE

## 2020-06-03 DIAGNOSIS — C06.9 MALIGNANT NEOPLASM OF MOUTH, UNSPECIFIED: ICD-10-CM

## 2020-06-03 DIAGNOSIS — C05.0 MALIGNANT NEOPLASM OF HARD PALATE: ICD-10-CM

## 2020-06-03 PROCEDURE — 99214 OFFICE O/P EST MOD 30 MIN: CPT | Mod: 25

## 2020-06-03 PROCEDURE — 31575 DIAGNOSTIC LARYNGOSCOPY: CPT

## 2020-07-14 ENCOUNTER — OUTPATIENT (OUTPATIENT)
Dept: OUTPATIENT SERVICES | Facility: HOSPITAL | Age: 80
LOS: 1 days | End: 2020-07-14
Payer: MEDICARE

## 2020-07-14 ENCOUNTER — APPOINTMENT (OUTPATIENT)
Dept: CT IMAGING | Facility: HOSPITAL | Age: 80
End: 2020-07-14
Payer: MEDICARE

## 2020-07-14 ENCOUNTER — RESULT REVIEW (OUTPATIENT)
Age: 80
End: 2020-07-14

## 2020-07-14 DIAGNOSIS — C32.3 MALIGNANT NEOPLASM OF LARYNGEAL CARTILAGE: Chronic | ICD-10-CM

## 2020-07-14 DIAGNOSIS — Z90.49 ACQUIRED ABSENCE OF OTHER SPECIFIED PARTS OF DIGESTIVE TRACT: Chronic | ICD-10-CM

## 2020-07-14 DIAGNOSIS — Z90.89 ACQUIRED ABSENCE OF OTHER ORGANS: Chronic | ICD-10-CM

## 2020-07-14 DIAGNOSIS — Z41.9 ENCOUNTER FOR PROCEDURE FOR PURPOSES OTHER THAN REMEDYING HEALTH STATE, UNSPECIFIED: Chronic | ICD-10-CM

## 2020-07-14 PROCEDURE — 70491 CT SOFT TISSUE NECK W/DYE: CPT

## 2020-07-14 PROCEDURE — 71260 CT THORAX DX C+: CPT | Mod: 26,76

## 2020-07-14 PROCEDURE — 70491 CT SOFT TISSUE NECK W/DYE: CPT | Mod: 26

## 2020-07-14 PROCEDURE — 71260 CT THORAX DX C+: CPT

## 2020-07-14 PROCEDURE — 70491 CT SOFT TISSUE NECK W/DYE: CPT | Mod: 26,76

## 2020-07-30 ENCOUNTER — APPOINTMENT (OUTPATIENT)
Dept: RADIATION ONCOLOGY | Facility: CLINIC | Age: 80
End: 2020-07-30
Payer: MEDICARE

## 2020-07-30 VITALS
BODY MASS INDEX: 18.45 KG/M2 | RESPIRATION RATE: 18 BRPM | SYSTOLIC BLOOD PRESSURE: 117 MMHG | DIASTOLIC BLOOD PRESSURE: 77 MMHG | WEIGHT: 136 LBS | HEART RATE: 73 BPM | OXYGEN SATURATION: 99 %

## 2020-07-30 PROCEDURE — 99214 OFFICE O/P EST MOD 30 MIN: CPT

## 2020-08-04 NOTE — HISTORY OF PRESENT ILLNESS
[FreeTextEntry1] : Mr. 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 He was found to have recurrence in the Rt soft palate, Opx 1/2018, and completed 7000 cGy to the oropharynx from 3/12/18-4/27/18 for treatment of recurrent early stage oral SCC.\par \par 7/30/20- Follow-up\par Mr. Franklin returns for routine follow-up.  He had a telephone follow-up on 4/28/20.  Plan was for CT and in-office exam between June and August post acute phase of COVID, f/u with Dr. Wilson, dental and PMD.  \par \par CT neck with contrast done 7/14/2020:\par There remains no evidence of local or regional radha recurrence of oropharyngeal and laryngeal carcinomas, though assessment of the palate and oral cavity is limited by dental artifact. Is likely that PET/CT will be more accurate in the detection of small volume disease recurrence.\par \par CT chest with contrast done 7/14/2020: \par -Mass/consolidation seen in the superior lingula has increased in size as compared to the CT chest of 4/18/2017 and PET CT 10/25/2018. Findings can be correlated with bronchoscopy for tissue sampling. Differential diagnosis would include organizing pneumonia.\par -Other lung nodules are unchanged.\par -No adenopathy.\par \par He last saw Dr. Wilson on 6/3/20; plan is for f/u in 5-6 months.  He last saw dental a month ago.  He has not seen PCP recently (last visit was in February 2020).  Today, he notes he is stable in terms of dysphagia when eating dry foods. Also notes mild xerostomia at times, but it is not bothersome. Of note, he chipped a tooth yesterday. He also reports he had excision of basal cell carcinoma on left external ear on Monday. He saw dermatologist Dr. Saeed today for follow up and will see her again next week. He reports he does H&N exercises "fairly regularly." He denies any further complaints to include fever, chills, cough, CP, SOB. \par \par \par 4/28/20 - Follow-up by Telephone due to COVID-19 (Patient has no video capability)\par Mr. Franklin is being seen via telehealth for routine follow-up.  He was last seen on 11/14/19.  Plan was for CT neck and chest in April, continue follow-up with dental, PMD, and Dr. Álvarez, continue PreviDent, head and neck exercises, alcohol reduction.  \par He was seen by his ENT in [place] in February who examined him and said he was cancer-free. He was given a report to send to us. \par Today, he feels stable. Throat is not getting worse. He also recently cancelled an in-person appt with Dr. Wilson. He continues to do his H&N exercises. He last saw Namrata Vicente OT in November.\par \par 11/14/19 - Follow-up \par Mr. Franklin returns today for routine follow-up.  He was last seen on 5/2/19.  Plan was for xerostomia strategies, head and neck exercises, OT for lymphedema therapy, continue f/u with dental and PMD, f/u SLP for any worsening symptoms, f/u with GI for endoscopies PRN.  He was also advised on alcohol reduction and routine health maintenance.  He last saw dental in July, will follow-up again next week.  He last saw his PMD one week ago.  Today, he reports feeling well.  He notes stable dysphagia and odynophagia with dry foods and stable xerostomia.  He denies other complaints to include pain, dyspnea, chest pain and palpitations.  He does see Namrata Vicente OT for lymphedema therapy.  He denies smoking.  Drinks a glass of wine with dinner most nights.  \par Will be traveling to [place] for winter.\par \par 5/2/19 - Follow-up\primitivo Franklin returns today for routine follow-up.  He was last seen here on 11/5/18.  Plan at that time was for repeat CT neck in 4 months, f/u SLP, f/u OT for lymphedema treatment, f/u with dental.  Self-care measures (xerostomia management, head and neck exercises) also reviewed at that time.  He spent most of the winter in [place] and is now back in town. \par \par CT neck with contrast 4/23/19 - Impression: No evidence of local or regional radha recurrence of squamous cell carcinoma.  \par \par He last saw SLP in October 2018. He last saw dental in April.  He last saw Dr. Wilson on 4/9/19.  He reports having been started on synthroid by his PMD approximately 6 months ago.  He is scheduled to follow-up with PMD in 1 week.  Today, he reports feeling well.  He continues to have xerostomia.  He is using baking soda rinses with little relief.  He also reports dysphagia and odynophagia that he relates to xerostomia, worst with dry foods.  He states he is eating 3 full meals per day, but tries to eat soft foods that are easier to swallow.  He states he has stopped doing head and neck exercises. He drinks 1-2 glasses of wine most nights with dinner. He feels his GI complaints (dry food getting stuck in lower neck) are stable and he does not need GI follow up. Has used PPI in past with no relief. Has a GI doc locally who has done endoscopies 3 years ago. \par \par \par 11/5/18-Follow up\par At his last visit he complained of epigastric burning and dysphagia. \par PET/CT showed some streak uptake in soft palate with plan to repeat  PET/CT in 3 months.  He f/u with SLP 10/16/18 for dysphagia therapy. He also followed up with Dr. Wilson 9/18/18 and complained of lower chest discomfort upon eating. Barium swallow showed no evidence of esophageal dysmotility, no mass, stricture or other esophageal abnormality was seen.\par \par PET/CT 10/25/18\par IMPRESSION: \par 1. Only mild residual activity along the left side the hard palate, unchanged since 7/25/2018. Faint activity previously seen along the right oropharyngeal wall is no longer present. No regional or distal hypermetabolic lymphadenopathy. \par 2. Continued low-level activity within an area of atelectasis versus scarring in the left upper lobe lingula (maximum SUV 2.8), relatively unchanged since 7/25/2018 however moderately improved/reduced since 3/24/2016. \par \par He was seen by Sang Arellano on 10/16/18: "patient underwent a repeat modified barium swallow study on 9/18/18.  Exam showed a mild pharyngeal dysphagia with penetration on cup sips of thin liquid with trace silent aspiration on initial trial, as well as mild pharyngeal residue after the swallow.  Patient recommended to continue on mechanical soft solid diet with thin liquids with periodic throat clearing to reduce risk of aspiration and continuation of dysphagia therapy."\par \par Today he states that he feels generally well. He states that he continues to have intermittent epigastric pain with swallowing various foods. He eats mainly soft textured foods. He notes dry mouth for which he uses bicarbonate rinses . He does H&N exercises. He has followed up with his PMD and has been seeing his dentist regularly. He does not want to use a PPI or see GI for further workup of his epigastric discomfort.\par \par \par 8/2/18\par Mr. Fer Franklin presents today for routine follow up and to review PET/CT results. 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 He was found to have recurrence in the Rt soft palate, Opx 1/2018, and completed 7000 cGy to the oropharynx from 3/12/18-4/27/18 for treatment of recurrent early stage oral SCC.\par \par He last saw us 7/10/18 and was CHEY on exam. He was advised to resume PPI for GERD symptoms, discontinue gabapentin and continue dental care and rinses for dry mouth\par \par PET scan on 7/25/18 showed improvement in right oropharyngeal and hard palate abnormality. There were small residual foci of increased metabolic activity remaining in the right lateral oropharynx and on the left side of the hard palate. There was a decrease in the size of a left lung nodule associated with bronchiectasis and scarring, probably representing post inflammatory change. No change in a few sclerotic bone lesions. \par \par Today he feels generally well. He notes burning epigastric pain with swallowing 3/10. He has f/u with SLP for dysphagia with recommendation to continue H&N/swallowing exercises with plan to do modified barium swallow in no improvement at next f/u in 4-6 weeks. he has also f/u with Dr. Wilson and was found to be doing well.\par \par \par ONCOLOGY HISTORY\par Mr. 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 He now returns with a NEW biopsy proven SCC of the Right soft palate, hard palate, and RMT that is outside of prior radiation field.\par \par He is s/p concurrent radiation therapy with chemotherapy completed 6/23/16 with curative intent. Treatment was directed at the supraglottic larynx, vallecula/tongue base, and bilateral necks. Chemotherapy was weekly cisplatin given by Dr. Dowell.\par \par Mr. 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 Wilson. Pathology revealed infiltrating squamous cell carcinoma, depth of invasion 2mm, arising in ulcerated inflamed mucosa with high grade dysplasia.  The inferior margin showed squamous mucosa, positive for high grade dysplasia. Anterior margin showed focally disrupted squamous mucosa negative for dysplasia, and dislodged fragment of high grade dysplasia. Second right oropharynx showed at least high grade dysplasia, suspicious for superficial invasion, and the tumor of the right soft palate showed high grade dysplasia, with brisk underlying lymphoplasmacytic infiltrate. \par \par Mr. Franklin presents today for further consideration for radiation therapy. He still has significant throat pain after surgery. He is not eating and drinking well because of this. He is eating mostly papaya, and notes he has lost 10 pounds since surgery as judged by his home scale. He has a slight cough. His energy levels are significantly decreased, and he has felt like he has a fever on and off. He is not taking tylenol despite significant pain at the surgical site.\par \par He continues with head and neck exercises, and sees a dentist every 4 months. He is not using flouride toothpaste. \par He has dry mouth as well. \par \par TREATMENT SUMMARY: \par Treatment Site: Larynx, Oropharynx, Necks \par Total dose 7,000cGy / 35 fractions with dose-painting\par Treatment Dates: 5/05/2016 to 6/23/2016\par \par He did not have any unexpected treatment complications during the course of treatment.  He tolerated treatment exceptionally well and did not require narcotic pain medications. He maintained an adequate weight and did not have any breaks in radiation or chemotherapy. \par \par ONCOLOGIC HISTORY: \par Mr. 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 He was monitored every 3 months in [place] by Dr. Álvarez and also in [place] by Dr. Bao Sanders. His visit to Dr. Leon in February 2016 was notable for a concerning lesion arising at the right vallecula/right lateral pharyngeal wall. This was separate from the previously treated lesion in the left AE fold that had been removed by laser. On March 2, 2016, Dr. Leon biopsied the right vallecula which showed squamous proliferative papillary lesion with high-grade dysplasia, and the left aryepiglottic fold which showed squamous cell carcinoma. Pathology review at [facility] confirmed the diagnosis of SCC in the left AE fold and squamous dysplasia with high-grade features in the right vallecula. PET/CT scan showed a maximum SUV of 9.2 in the right vallecular area as well as a 1.6 cm left cervical lymph node with maximum SUV of 9.8. FNA of the left neck node on 4/7/16 showed SCC.  \par \par 1/29/18- Mr. 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 He now returns with a NEW biopsy proven SCC of the Right soft palate, hard palate, and RMT that is outside of prior radiation field.\par He is s/p concurrent radiation therapy with chemotherapy completed 6/23/16 with curative intent. Treatment was directed at the supraglottic larynx, vallecula/tongue base, and bilateral necks. Chemotherapy was weekly cisplatin given by Dr. Dowell.\par \par Mr. Franklin underwent biopsy of Rt. retromolar trigone erythroleukoplakia on 12/15/17, which revealed severe dysplaia with focal ulceration, extending up to biopsy margins.\par On 1/23/18, the patient underwent biopsy of right soft palate, right oropharynx, and right piriform sinus with Dr. Wilson. Pathology revealed infiltrating squamous cell carcinoma, depth of invasion 2mm, arising in ulcerated inflamed mucosa with high grade dysplasia.  The inferior margin showed squamous mucosa, positive for high grade dysplasia. Anterior margin showed focally disrupted squamous mucosa negative for dysplasia, and dislodged fragment of high grade dysplasia. Second right oropharynx showed at least high grade dysplasia, suspicious for superficial invasion, and the tumor of the right soft palate showed high grade dysplasia, with brisk underlying lymphoplasmacytic infiltrate. \par \par At the time of initial re-consult for radiation therapy Mr. Franklin still had significant throat pain after surgery.\par \par 5/22/18- Mr. Fer Franklin presents today for post treatment evaluation.\par His weight is up two pounds from his last week of treatment today. Appetite is good, however he needs to make a conscious effort to eat and swallow. Most of his mouth has healed well, however he feels he still has pain to his right lower mouth. \par He has trouble swallowing dry things, and needs to sip extra water when he eats them. Taste sensation intact. Not using prevident because it burns too much. DOing head and neck exercises, and planning to make an appointment with [facility] speech and swallow. Energy is good. Thick saliva is improving. \par \par 7/10/18- Mr. Franklin feels well. Notes some discomfort with swallowing. Started thyroid medication, following with speech and swallow. Able to swallow liquids, trouble with dry foods.

## 2020-08-04 NOTE — PHYSICAL EXAM
[Thin] : thin [Heart Rate And Rhythm] : heart rate and rhythm were normal [Heart Sounds] : normal S1 and S2 [Bowel Sounds] : normal bowel sounds [Abdomen Soft] : soft [Abdomen Tenderness] : non-tender [Normal] : oriented to person, place and time, the affect was normal, the mood was normal and not anxious [de-identified] : s/p excision of BCC to superior portion left external ear; area erythematous with [de-identified] : bilateral neck fibrosis

## 2020-08-20 ENCOUNTER — APPOINTMENT (OUTPATIENT)
Dept: PULMONOLOGY | Facility: CLINIC | Age: 80
End: 2020-08-20
Payer: MEDICARE

## 2020-08-20 VITALS
TEMPERATURE: 97.3 F | OXYGEN SATURATION: 98 % | WEIGHT: 136 LBS | SYSTOLIC BLOOD PRESSURE: 126 MMHG | BODY MASS INDEX: 18.42 KG/M2 | DIASTOLIC BLOOD PRESSURE: 70 MMHG | HEART RATE: 69 BPM | HEIGHT: 72 IN

## 2020-08-20 PROCEDURE — 99204 OFFICE O/P NEW MOD 45 MIN: CPT | Mod: 25

## 2020-08-20 PROCEDURE — 94010 BREATHING CAPACITY TEST: CPT

## 2020-08-20 NOTE — ASSESSMENT
[FreeTextEntry1] : Data reviewed:\par \par CT chest 7/14/20 [facility] personally reviewed: Mass/consolidation seen in the superior lingula has increased in size as compared to the CT chest of 4/18/2017 and PET CT 10/25/2018. No adenopathy. \par \par [place] 8/20/20: restricted, best FEV1 80%\par \par Impression:\par Masslike consolidation in lingula\par History of head and neck tumor\par \par Plan:\par DDx includes malignancy vs inflammation (related to aspiration?) Recommend bronchoscopy, referred to IP.

## 2020-08-20 NOTE — PHYSICAL EXAM
[No Acute Distress] : no acute distress [Normal Appearance] : normal appearance [Normal Rate/Rhythm] : normal rate/rhythm [Normal S1, S2] : normal s1, s2 [No Murmurs] : no murmurs [No Resp Distress] : no resp distress [Benign] : benign [No Clubbing] : no clubbing [Clear to Auscultation Bilaterally] : clear to auscultation bilaterally [Normal Affect] : normal affect [No Edema] : no edema [Normal Color/ Pigmentation] : normal color/ pigmentation

## 2020-08-26 DIAGNOSIS — Z11.59 ENCOUNTER FOR SCREENING FOR OTHER VIRAL DISEASES: ICD-10-CM

## 2020-08-29 ENCOUNTER — LABORATORY RESULT (OUTPATIENT)
Age: 80
End: 2020-08-29

## 2020-08-31 LAB
ALBUMIN SERPL ELPH-MCNC: 4.5 G/DL
ALP BLD-CCNC: 56 U/L
ALT SERPL-CCNC: 10 U/L
ANION GAP SERPL CALC-SCNC: 12 MMOL/L
APTT BLD: 35.1 SEC
AST SERPL-CCNC: 12 U/L
BASOPHILS # BLD AUTO: 0.05 K/UL
BASOPHILS NFR BLD AUTO: 1.2 %
BILIRUB SERPL-MCNC: 1 MG/DL
BUN SERPL-MCNC: 13 MG/DL
CALCIUM SERPL-MCNC: 9.5 MG/DL
CHLORIDE SERPL-SCNC: 101 MMOL/L
CO2 SERPL-SCNC: 29 MMOL/L
CREAT SERPL-MCNC: 0.72 MG/DL
EOSINOPHIL # BLD AUTO: 0.09 K/UL
EOSINOPHIL NFR BLD AUTO: 2.1 %
GLUCOSE SERPL-MCNC: 90 MG/DL
HCT VFR BLD CALC: 39.6 %
HGB BLD-MCNC: 12.5 G/DL
IMM GRANULOCYTES NFR BLD AUTO: 0.2 %
INR PPP: 0.92 RATIO
LYMPHOCYTES # BLD AUTO: 0.71 K/UL
LYMPHOCYTES NFR BLD AUTO: 16.4 %
MAN DIFF?: NORMAL
MCHC RBC-ENTMCNC: 31.5 PG
MCHC RBC-ENTMCNC: 31.6 GM/DL
MCV RBC AUTO: 99.7 FL
MONOCYTES # BLD AUTO: 0.46 K/UL
MONOCYTES NFR BLD AUTO: 10.6 %
NEUTROPHILS # BLD AUTO: 3.02 K/UL
NEUTROPHILS NFR BLD AUTO: 69.5 %
PLATELET # BLD AUTO: 183 K/UL
POTASSIUM SERPL-SCNC: 4.9 MMOL/L
PROT SERPL-MCNC: 6.7 G/DL
PT BLD: 11 SEC
RBC # BLD: 3.97 M/UL
RBC # FLD: 13 %
SODIUM SERPL-SCNC: 142 MMOL/L
WBC # FLD AUTO: 4.34 K/UL

## 2020-09-01 ENCOUNTER — RESULT REVIEW (OUTPATIENT)
Age: 80
End: 2020-09-01

## 2020-09-01 ENCOUNTER — OUTPATIENT (OUTPATIENT)
Dept: OUTPATIENT SERVICES | Facility: HOSPITAL | Age: 80
LOS: 1 days | Discharge: HOME | End: 2020-09-01
Payer: MEDICARE

## 2020-09-01 ENCOUNTER — APPOINTMENT (OUTPATIENT)
Dept: PULMONOLOGY | Facility: HOSPITAL | Age: 80
End: 2020-09-01

## 2020-09-01 DIAGNOSIS — Z41.9 ENCOUNTER FOR PROCEDURE FOR PURPOSES OTHER THAN REMEDYING HEALTH STATE, UNSPECIFIED: Chronic | ICD-10-CM

## 2020-09-01 DIAGNOSIS — Z90.49 ACQUIRED ABSENCE OF OTHER SPECIFIED PARTS OF DIGESTIVE TRACT: Chronic | ICD-10-CM

## 2020-09-01 DIAGNOSIS — Z90.89 ACQUIRED ABSENCE OF OTHER ORGANS: Chronic | ICD-10-CM

## 2020-09-01 DIAGNOSIS — C32.3 MALIGNANT NEOPLASM OF LARYNGEAL CARTILAGE: Chronic | ICD-10-CM

## 2020-09-01 LAB
GRAM STN FLD: SIGNIFICANT CHANGE UP
SPECIMEN SOURCE: SIGNIFICANT CHANGE UP

## 2020-09-01 PROCEDURE — 88173 CYTOPATH EVAL FNA REPORT: CPT | Mod: 26

## 2020-09-01 PROCEDURE — 31624 DX BRONCHOSCOPE/LAVAGE: CPT | Mod: GC

## 2020-09-01 PROCEDURE — 31627 NAVIGATIONAL BRONCHOSCOPY: CPT | Mod: GC

## 2020-09-01 PROCEDURE — 31623 DX BRONCHOSCOPE/BRUSH: CPT | Mod: GC

## 2020-09-01 PROCEDURE — 88342 IMHCHEM/IMCYTCHM 1ST ANTB: CPT | Mod: 26,59

## 2020-09-01 PROCEDURE — 88344 IMHCHEM/IMCYTCHM EA MLT ANTB: CPT | Mod: 26

## 2020-09-01 PROCEDURE — 88305 TISSUE EXAM BY PATHOLOGIST: CPT | Mod: 26

## 2020-09-01 PROCEDURE — 88333 PATH CONSLTJ SURG CYTO XM 1: CPT | Mod: 26,59

## 2020-09-01 PROCEDURE — 31654 BRONCH EBUS IVNTJ PERPH LES: CPT | Mod: GC

## 2020-09-01 PROCEDURE — 31653 BRONCH EBUS SAMPLNG 3/> NODE: CPT | Mod: GC

## 2020-09-01 PROCEDURE — 31628 BRONCHOSCOPY/LUNG BX EACH: CPT | Mod: GC

## 2020-09-01 PROCEDURE — 71045 X-RAY EXAM CHEST 1 VIEW: CPT | Mod: 26

## 2020-09-01 PROCEDURE — 88112 CYTOPATH CELL ENHANCE TECH: CPT | Mod: 26,59

## 2020-09-01 PROCEDURE — 31645 BRNCHSC W/THER ASPIR 1ST: CPT | Mod: GC

## 2020-09-01 PROCEDURE — 88341 IMHCHEM/IMCYTCHM EA ADD ANTB: CPT | Mod: 26,59

## 2020-09-02 LAB
NIGHT BLUE STAIN TISS: SIGNIFICANT CHANGE UP
SPECIMEN SOURCE: SIGNIFICANT CHANGE UP

## 2020-09-02 PROCEDURE — 88173 CYTOPATH EVAL FNA REPORT: CPT

## 2020-09-02 PROCEDURE — 88344 IMHCHEM/IMCYTCHM EA MLT ANTB: CPT

## 2020-09-02 PROCEDURE — 88341 IMHCHEM/IMCYTCHM EA ADD ANTB: CPT

## 2020-09-02 PROCEDURE — 87252 VIRUS INOCULATION TISSUE: CPT

## 2020-09-02 PROCEDURE — 88112 CYTOPATH CELL ENHANCE TECH: CPT

## 2020-09-02 PROCEDURE — 31627 NAVIGATIONAL BRONCHOSCOPY: CPT

## 2020-09-02 PROCEDURE — 71045 X-RAY EXAM CHEST 1 VIEW: CPT

## 2020-09-02 PROCEDURE — 88333 PATH CONSLTJ SURG CYTO XM 1: CPT

## 2020-09-02 PROCEDURE — 31653 BRONCH EBUS SAMPLNG 3/> NODE: CPT

## 2020-09-02 PROCEDURE — 31623 DX BRONCHOSCOPE/BRUSH: CPT | Mod: LT

## 2020-09-02 PROCEDURE — 87070 CULTURE OTHR SPECIMN AEROBIC: CPT

## 2020-09-02 PROCEDURE — 87015 SPECIMEN INFECT AGNT CONCNTJ: CPT

## 2020-09-02 PROCEDURE — 31628 BRONCHOSCOPY/LUNG BX EACH: CPT | Mod: LT

## 2020-09-02 PROCEDURE — 87206 SMEAR FLUORESCENT/ACID STAI: CPT

## 2020-09-02 PROCEDURE — 31624 DX BRONCHOSCOPE/LAVAGE: CPT | Mod: LT

## 2020-09-02 PROCEDURE — 87102 FUNGUS ISOLATION CULTURE: CPT

## 2020-09-02 PROCEDURE — 88305 TISSUE EXAM BY PATHOLOGIST: CPT

## 2020-09-02 PROCEDURE — 87116 MYCOBACTERIA CULTURE: CPT

## 2020-09-03 LAB
CULTURE RESULTS: SIGNIFICANT CHANGE UP
NON-GYNECOLOGICAL CYTOLOGY STUDY: SIGNIFICANT CHANGE UP
SPECIMEN SOURCE: SIGNIFICANT CHANGE UP
SURGICAL PATHOLOGY STUDY: SIGNIFICANT CHANGE UP

## 2020-09-08 DIAGNOSIS — K21.9 GASTRO-ESOPHAGEAL REFLUX DISEASE WITHOUT ESOPHAGITIS: ICD-10-CM

## 2020-09-08 DIAGNOSIS — C34.12 MALIGNANT NEOPLASM OF UPPER LOBE, LEFT BRONCHUS OR LUNG: ICD-10-CM

## 2020-09-08 DIAGNOSIS — Z85.818 PERSONAL HISTORY OF MALIGNANT NEOPLASM OF OTHER SITES OF LIP, ORAL CAVITY, AND PHARYNX: ICD-10-CM

## 2020-09-08 DIAGNOSIS — E03.9 HYPOTHYROIDISM, UNSPECIFIED: ICD-10-CM

## 2020-09-08 DIAGNOSIS — N40.0 BENIGN PROSTATIC HYPERPLASIA WITHOUT LOWER URINARY TRACT SYMPTOMS: ICD-10-CM

## 2020-09-11 LAB — VIRUS SPEC CULT: SIGNIFICANT CHANGE UP

## 2020-09-17 ENCOUNTER — APPOINTMENT (OUTPATIENT)
Dept: RADIATION ONCOLOGY | Facility: CLINIC | Age: 80
End: 2020-09-17
Payer: MEDICARE

## 2020-09-17 VITALS
HEART RATE: 80 BPM | BODY MASS INDEX: 18.68 KG/M2 | OXYGEN SATURATION: 98 % | WEIGHT: 137.7 LBS | SYSTOLIC BLOOD PRESSURE: 124 MMHG | RESPIRATION RATE: 15 BRPM | DIASTOLIC BLOOD PRESSURE: 72 MMHG

## 2020-09-17 PROCEDURE — 99214 OFFICE O/P EST MOD 30 MIN: CPT

## 2020-09-17 RX ORDER — SODIUM FLUORIDE 6 MG/ML
1.1 PASTE, DENTIFRICE DENTAL
Qty: 100 | Refills: 3 | Status: DISCONTINUED | COMMUNITY
Start: 2018-03-13 | End: 2020-09-17

## 2020-09-17 NOTE — HISTORY OF PRESENT ILLNESS
[FreeTextEntry1] : Mr. 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 He was found to have recurrence in the Rt soft palate, Opx 1/2018, and completed 7000 cGy to the oropharynx from 3/12/18-4/27/18 for treatment of recurrent early stage oral SCC.\par \par 9/16/20 - Follow-up\par Mr. Franklin returns for follow-up.  He was last seen on 7/30/20.  At that time, head and neck exam showed CHEY, but recent CT chest showed an enlarging left lingula mass/consolidation.  Plan was for referral to pulmonary Dr. Jaquez for further work-up of CT findings, continue head and neck exercises, resume lymphedema therapy, SLP follow-up for occasional coughing/gagging with eating, continue routine dental f/u and PreviDent, PET/CT in 8 months.  He underwent bronchoscopy with biopsies on 9/8/20.  Pathology as follows: \par \par Lung, left lingula, biopsy and touch prep \par - Nonkeratinizing squamous cell carcinoma, see note\par - Note: Immunostain workup reveals strong positive staining for p40. The tumor cells are negative for TTF1-naspsin (duel stain).\par Staining with p16 is is patchy. Strong, diffuse staining with p16 is not observed.\par - The patient is noted to have history of squamous cell carcinoma of the oropharynx (per the 2014 report, the carcinoma was negative for HPV RODRIGUEZ 16/11, 16/18, and 31/33. p16 staining was not performed).  This current lesion may represent a primary lung squamous cell carcinoma or possible metastasis from the oropharynx. Clinical correlation advised.\par LN 11L, FNA - negative for malignant cells\par LN 4L, FNA- nondiagnostic \par LN 7, FNA - nondiagnostic\par LN 11 R, FNA - negative for malignant cells\par BAL left lingular - negative for malignant cells\par Lung, left lingula, FNA - negative for malignant cells \par \par Today, he reports feeling stable since his last visit, "nothing has changed."  He continues to have dysphagia with dry foods and occasional cough when swallowing.  He has not followed up with SLP.  He did not resume lymphedema therapy because "I just didn't do it."  He is doing head and neck exercises on his own.  He saw dental 6 weeks ago.  He is not using PreviDent.  He had a basal cell carcinoma removed from his left ear a few weeks ago.  He denies pain, odynophagia, dyspnea, cough.  \par \par 7/30/20- Follow-up\par Mr. Franklin returns for routine follow-up.  He had a telephone follow-up on 4/28/20.  Plan was for CT and in-office exam between June and August post acute phase of COVID, f/u with Dr. Wilson, dental and PMD.  \par \par CT neck with contrast done 7/14/2020:\par There remains no evidence of local or regional radha recurrence of oropharyngeal and laryngeal carcinomas, though assessment of the palate and oral cavity is limited by dental artifact. Is likely that PET/CT will be more accurate in the detection of small volume disease recurrence.\par \par CT chest with contrast done 7/14/2020: \par -Mass/consolidation seen in the superior lingula has increased in size as compared to the CT chest of 4/18/2017 and PET CT 10/25/2018. Findings can be correlated with bronchoscopy for tissue sampling. Differential diagnosis would include organizing pneumonia.\par -Other lung nodules are unchanged.\par -No adenopathy.\par \par He last saw Dr. Wilson on 6/3/20; plan is for f/u in 5-6 months.  He last saw dental a month ago.  He has not seen PCP recently (last visit was in February 2020).  Today, he notes he is stable in terms of dysphagia when eating dry foods. Also notes mild xerostomia at times, but it is not bothersome. Of note, he chipped a tooth yesterday. He also reports he had excision of basal cell carcinoma on left external ear on Monday. He saw dermatologist Dr. Saeed today for follow up and will see her again next week. He reports he does H&N exercises "fairly regularly." He denies any further complaints to include fever, chills, cough, CP, SOB. \par \par \par 4/28/20 - Follow-up by Telephone due to COVID-19 (Patient has no video capability)\par Mr. Franklin is being seen via telehealth for routine follow-up.  He was last seen on 11/14/19.  Plan was for CT neck and chest in April, continue follow-up with dental, PMD, and Dr. Álvarez, continue PreviDent, head and neck exercises, alcohol reduction.  \par He was seen by his ENT in [place] in February who examined him and said he was cancer-free. He was given a report to send to us. \par Today, he feels stable. Throat is not getting worse. He also recently cancelled an in-person appt with Dr. Wilson. He continues to do his H&N exercises. He last saw Namrata Vicente OT in November.\par \par 11/14/19 - Follow-up \par Mr. Franklin returns today for routine follow-up.  He was last seen on 5/2/19.  Plan was for xerostomia strategies, head and neck exercises, OT for lymphedema therapy, continue f/u with dental and PMD, f/u SLP for any worsening symptoms, f/u with GI for endoscopies PRN.  He was also advised on alcohol reduction and routine health maintenance.  He last saw dental in July, will follow-up again next week.  He last saw his PMD one week ago.  Today, he reports feeling well.  He notes stable dysphagia and odynophagia with dry foods and stable xerostomia.  He denies other complaints to include pain, dyspnea, chest pain and palpitations.  He does see Namrata Vicente OT for lymphedema therapy.  He denies smoking.  Drinks a glass of wine with dinner most nights.  \par Will be traveling to [place] for winter.\par \par 5/2/19 - Follow-up\primitivo Franklin returns today for routine follow-up.  He was last seen here on 11/5/18.  Plan at that time was for repeat CT neck in 4 months, f/u SLP, f/u OT for lymphedema treatment, f/u with dental.  Self-care measures (xerostomia management, head and neck exercises) also reviewed at that time.  He spent most of the winter in [place] and is now back in town. \par \par CT neck with contrast 4/23/19 - Impression: No evidence of local or regional radha recurrence of squamous cell carcinoma.  \par \par He last saw SLP in October 2018. He last saw dental in April.  He last saw Dr. Wilson on 4/9/19.  He reports having been started on synthroid by his PMD approximately 6 months ago.  He is scheduled to follow-up with PMD in 1 week.  Today, he reports feeling well.  He continues to have xerostomia.  He is using baking soda rinses with little relief.  He also reports dysphagia and odynophagia that he relates to xerostomia, worst with dry foods.  He states he is eating 3 full meals per day, but tries to eat soft foods that are easier to swallow.  He states he has stopped doing head and neck exercises. He drinks 1-2 glasses of wine most nights with dinner. He feels his GI complaints (dry food getting stuck in lower neck) are stable and he does not need GI follow up. Has used PPI in past with no relief. Has a GI doc locally who has done endoscopies 3 years ago. \par \par \par 11/5/18-Follow up\par At his last visit he complained of epigastric burning and dysphagia. \par PET/CT showed some streak uptake in soft palate with plan to repeat  PET/CT in 3 months.  He f/u with SLP 10/16/18 for dysphagia therapy. He also followed up with Dr. Wilson 9/18/18 and complained of lower chest discomfort upon eating. Barium swallow showed no evidence of esophageal dysmotility, no mass, stricture or other esophageal abnormality was seen.\par \par PET/CT 10/25/18\par IMPRESSION: \par 1. Only mild residual activity along the left side the hard palate, unchanged since 7/25/2018. Faint activity previously seen along the right oropharyngeal wall is no longer present. No regional or distal hypermetabolic lymphadenopathy. \par 2. Continued low-level activity within an area of atelectasis versus scarring in the left upper lobe lingula (maximum SUV 2.8), relatively unchanged since 7/25/2018 however moderately improved/reduced since 3/24/2016. \par \par He was seen by Sang Arellano on 10/16/18: "patient underwent a repeat modified barium swallow study on 9/18/18.  Exam showed a mild pharyngeal dysphagia with penetration on cup sips of thin liquid with trace silent aspiration on initial trial, as well as mild pharyngeal residue after the swallow.  Patient recommended to continue on mechanical soft solid diet with thin liquids with periodic throat clearing to reduce risk of aspiration and continuation of dysphagia therapy."\par \par Today he states that he feels generally well. He states that he continues to have intermittent epigastric pain with swallowing various foods. He eats mainly soft textured foods. He notes dry mouth for which he uses bicarbonate rinses . He does H&N exercises. He has followed up with his PMD and has been seeing his dentist regularly. He does not want to use a PPI or see GI for further workup of his epigastric discomfort.\par \par \par 8/2/18\par Mr. Fer Franklin presents today for routine follow up and to review PET/CT results. 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 He was found to have recurrence in the Rt soft palate, Opx 1/2018, and completed 7000 cGy to the oropharynx from 3/12/18-4/27/18 for treatment of recurrent early stage oral SCC.\par \par He last saw us 7/10/18 and was CHEY on exam. He was advised to resume PPI for GERD symptoms, discontinue gabapentin and continue dental care and rinses for dry mouth\par \par PET scan on 7/25/18 showed improvement in right oropharyngeal and hard palate abnormality. There were small residual foci of increased metabolic activity remaining in the right lateral oropharynx and on the left side of the hard palate. There was a decrease in the size of a left lung nodule associated with bronchiectasis and scarring, probably representing post inflammatory change. No change in a few sclerotic bone lesions. \par \par Today he feels generally well. He notes burning epigastric pain with swallowing 3/10. He has f/u with SLP for dysphagia with recommendation to continue H&N/swallowing exercises with plan to do modified barium swallow in no improvement at next f/u in 4-6 weeks. he has also f/u with Dr. iWlson and was found to be doing well.\par \par \par ONCOLOGY HISTORY\par Mr. 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 He now returns with a NEW biopsy proven SCC of the Right soft palate, hard palate, and RMT that is outside of prior radiation field.\par \par He is s/p concurrent radiation therapy with chemotherapy completed 6/23/16 with curative intent. Treatment was directed at the supraglottic larynx, vallecula/tongue base, and bilateral necks. Chemotherapy was weekly cisplatin given by Dr. Dowell.\par \par Mr. 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 Wilson. Pathology revealed infiltrating squamous cell carcinoma, depth of invasion 2mm, arising in ulcerated inflamed mucosa with high grade dysplasia.  The inferior margin showed squamous mucosa, positive for high grade dysplasia. Anterior margin showed focally disrupted squamous mucosa negative for dysplasia, and dislodged fragment of high grade dysplasia. Second right oropharynx showed at least high grade dysplasia, suspicious for superficial invasion, and the tumor of the right soft palate showed high grade dysplasia, with brisk underlying lymphoplasmacytic infiltrate. \par \par Mr. Franklin presents today for further consideration for radiation therapy. He still has significant throat pain after surgery. He is not eating and drinking well because of this. He is eating mostly papaya, and notes he has lost 10 pounds since surgery as judged by his home scale. He has a slight cough. His energy levels are significantly decreased, and he has felt like he has a fever on and off. He is not taking tylenol despite significant pain at the surgical site.\par \par He continues with head and neck exercises, and sees a dentist every 4 months. He is not using flouride toothpaste. \par He has dry mouth as well. \par \par TREATMENT SUMMARY: \par Treatment Site: Larynx, Oropharynx, Necks \par Total dose 7,000cGy / 35 fractions with dose-painting\par Treatment Dates: 5/05/2016 to 6/23/2016\par \par He did not have any unexpected treatment complications during the course of treatment.  He tolerated treatment exceptionally well and did not require narcotic pain medications. He maintained an adequate weight and did not have any breaks in radiation or chemotherapy. \par \par ONCOLOGIC HISTORY: \par Mr. 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 He was monitored every 3 months in [place] by Dr. Álvarez and also in [place] by Dr. Bao Sanders. His visit to Dr. Leon in February 2016 was notable for a concerning lesion arising at the right vallecula/right lateral pharyngeal wall. This was separate from the previously treated lesion in the left AE fold that had been removed by laser. On March 2, 2016, Dr. Leon biopsied the right vallecula which showed squamous proliferative papillary lesion with high-grade dysplasia, and the left aryepiglottic fold which showed squamous cell carcinoma. Pathology review at [facility] confirmed the diagnosis of SCC in the left AE fold and squamous dysplasia with high-grade features in the right vallecula. PET/CT scan showed a maximum SUV of 9.2 in the right vallecular area as well as a 1.6 cm left cervical lymph node with maximum SUV of 9.8. FNA of the left neck node on 4/7/16 showed SCC.  \par \par 1/29/18- Mr. 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 He now returns with a NEW biopsy proven SCC of the Right soft palate, hard palate, and RMT that is outside of prior radiation field.\par He is s/p concurrent radiation therapy with chemotherapy completed 6/23/16 with curative intent. Treatment was directed at the supraglottic larynx, vallecula/tongue base, and bilateral necks. Chemotherapy was weekly cisplatin given by Dr. Dowell.\par \par Mr. Franklin underwent biopsy of Rt. retromolar trigone erythroleukoplakia on 12/15/17, which revealed severe dysplaia with focal ulceration, extending up to biopsy margins.\par On 1/23/18, the patient underwent biopsy of right soft palate, right oropharynx, and right piriform sinus with Dr. Wilson. Pathology revealed infiltrating squamous cell carcinoma, depth of invasion 2mm, arising in ulcerated inflamed mucosa with high grade dysplasia.  The inferior margin showed squamous mucosa, positive for high grade dysplasia. Anterior margin showed focally disrupted squamous mucosa negative for dysplasia, and dislodged fragment of high grade dysplasia. Second right oropharynx showed at least high grade dysplasia, suspicious for superficial invasion, and the tumor of the right soft palate showed high grade dysplasia, with brisk underlying lymphoplasmacytic infiltrate. \par \par At the time of initial re-consult for radiation therapy Mr. Franklin still had significant throat pain after surgery.\par \par 5/22/18- Mr. Fer Franklin presents today for post treatment evaluation.\par His weight is up two pounds from his last week of treatment today. Appetite is good, however he needs to make a conscious effort to eat and swallow. Most of his mouth has healed well, however he feels he still has pain to his right lower mouth. \par He has trouble swallowing dry things, and needs to sip extra water when he eats them. Taste sensation intact. Not using prevident because it burns too much. DOing head and neck exercises, and planning to make an appointment with [facility] speech and swallow. Energy is good. Thick saliva is improving. \par \par 7/10/18- Mr. Franklin feels well. Notes some discomfort with swallowing. Started thyroid medication, following with speech and swallow. Able to swallow liquids, trouble with dry foods.

## 2020-09-17 NOTE — ASSESSMENT
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

## 2020-09-17 NOTE — DATA REVIEWED
[FreeTextEntry1] : I have personally reviewed all relevant imaging studies (CT) and pathology reports and I have discussed the case with Dr Jaquez and Dr Snow. \par

## 2020-09-17 NOTE — PHYSICAL EXAM
[Heart Rate And Rhythm] : heart rate and rhythm were normal [Thin] : thin [Heart Sounds] : normal S1 and S2 [Bowel Sounds] : normal bowel sounds [Abdomen Soft] : soft [Abdomen Tenderness] : non-tender [Normal] : oriented to person, place and time, the affect was normal, the mood was normal and not anxious [Normal Oral Cavity] : oral cavity was normal [Oropharynx] : The oropharynx was normal [de-identified] : oral cavity is at baseline with telangiectasia and scarring of left soft palate. s/p excision of BCC to superior portion left external ear with scabbing and erythema  [de-identified] : bilateral neck fibrosis

## 2020-09-21 ENCOUNTER — APPOINTMENT (OUTPATIENT)
Dept: MRI IMAGING | Facility: HOSPITAL | Age: 80
End: 2020-09-21

## 2020-09-21 ENCOUNTER — OUTPATIENT (OUTPATIENT)
Dept: OUTPATIENT SERVICES | Facility: HOSPITAL | Age: 80
LOS: 1 days | End: 2020-09-21
Payer: MEDICARE

## 2020-09-21 DIAGNOSIS — Z90.49 ACQUIRED ABSENCE OF OTHER SPECIFIED PARTS OF DIGESTIVE TRACT: Chronic | ICD-10-CM

## 2020-09-21 DIAGNOSIS — Z41.9 ENCOUNTER FOR PROCEDURE FOR PURPOSES OTHER THAN REMEDYING HEALTH STATE, UNSPECIFIED: Chronic | ICD-10-CM

## 2020-09-21 DIAGNOSIS — C32.3 MALIGNANT NEOPLASM OF LARYNGEAL CARTILAGE: Chronic | ICD-10-CM

## 2020-09-21 DIAGNOSIS — Z90.89 ACQUIRED ABSENCE OF OTHER ORGANS: Chronic | ICD-10-CM

## 2020-09-21 LAB — GLUCOSE BLDC GLUCOMTR-MCNC: 73 MG/DL — SIGNIFICANT CHANGE UP (ref 70–99)

## 2020-09-21 PROCEDURE — 78815 PET IMAGE W/CT SKULL-THIGH: CPT | Mod: 26

## 2020-09-21 PROCEDURE — 70553 MRI BRAIN STEM W/O & W/DYE: CPT | Mod: 26

## 2020-09-22 PROCEDURE — A9552: CPT

## 2020-09-22 PROCEDURE — 70553 MRI BRAIN STEM W/O & W/DYE: CPT

## 2020-09-22 PROCEDURE — A9585: CPT

## 2020-09-22 PROCEDURE — 82962 GLUCOSE BLOOD TEST: CPT

## 2020-09-22 PROCEDURE — 78815 PET IMAGE W/CT SKULL-THIGH: CPT

## 2020-09-24 ENCOUNTER — APPOINTMENT (OUTPATIENT)
Dept: RADIATION ONCOLOGY | Facility: CLINIC | Age: 80
End: 2020-09-24
Payer: MEDICARE

## 2020-09-24 ENCOUNTER — APPOINTMENT (OUTPATIENT)
Dept: THORACIC SURGERY | Facility: CLINIC | Age: 80
End: 2020-09-24
Payer: MEDICARE

## 2020-09-24 VITALS
RESPIRATION RATE: 16 BRPM | HEART RATE: 75 BPM | TEMPERATURE: 98 F | OXYGEN SATURATION: 97 % | DIASTOLIC BLOOD PRESSURE: 90 MMHG | SYSTOLIC BLOOD PRESSURE: 160 MMHG

## 2020-09-24 VITALS
HEART RATE: 65 BPM | OXYGEN SATURATION: 97 % | HEIGHT: 72 IN | RESPIRATION RATE: 17 BRPM | WEIGHT: 136 LBS | TEMPERATURE: 96.8 F | BODY MASS INDEX: 18.42 KG/M2 | SYSTOLIC BLOOD PRESSURE: 153 MMHG | DIASTOLIC BLOOD PRESSURE: 74 MMHG

## 2020-09-24 PROCEDURE — 99214 OFFICE O/P EST MOD 30 MIN: CPT

## 2020-09-24 PROCEDURE — 99204 OFFICE O/P NEW MOD 45 MIN: CPT

## 2020-09-24 NOTE — PHYSICAL EXAM
[Thin] : thin [Heart Rate And Rhythm] : heart rate and rhythm were normal [Heart Sounds] : normal S1 and S2 [Normal] : oriented to person, place and time, the affect was normal, the mood was normal and not anxious [Sclera] : the sclera and conjunctiva were normal [Extraocular Movements] : extraocular movements were intact [Outer Ear] : the ears and nose were normal in appearance [Hearing Threshold Finger Rub Not [place]] : hearing was normal [de-identified] : there is mild inflammation seen surrounding the right upper posterior molar root. Fair dentition throughout. s/p excision of BCC to superior portion left external ear with scabbing and erythema

## 2020-09-24 NOTE — PHYSICAL EXAM
[Neck Appearance] : the appearance of the neck was normal [Neck Cervical Mass (___cm)] : no neck mass was observed [Respiration, Rhythm And Depth] : normal respiratory rhythm and effort [Examination Of The Chest] : the chest was normal in appearance [Bowel Sounds] : normal bowel sounds [Abdomen Soft] : soft [Abnormal Walk] : normal gait [Nail Clubbing] : no clubbing  or cyanosis of the fingernails [Musculoskeletal - Swelling] : no joint swelling seen [Skin Color & Pigmentation] : normal skin color and pigmentation [Skin Turgor] : normal skin turgor [] : no rash [Oriented To Time, Place, And Person] : oriented to person, place, and time [Impaired Insight] : insight and judgment were intact [Affect] : the affect was normal

## 2020-09-24 NOTE — HISTORY OF PRESENT ILLNESS
[FreeTextEntry1] : Mr. 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 He was found to have recurrence in the Rt soft palate, Opx 1/2018, and completed 7000 cGy to the oropharynx from 3/12/18-4/27/18 for treatment of recurrent early stage oral SCC.  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  \par \par 9/24/20 - Follow-up\par Mr. Esquivel was last seen 1 week ago.  Plan was for staging PET and MRI brain and referral to thoracic surgery.  \par \par PET/CT 9/21/20\par - FDG avid left lingular mass corresponding to patient's known malignancy.\par - Focal FDG uptake adjacent to the right maxillary first and third molars which may represent periodontal disease, posttreatment changes or neoplasm and correlation with physical examination is recommended.\par - No additional sites of abnormal FDG uptake to suggest biologic tumor activity.\par \par MRI brain 9/21/20\par - No evidence of intracranial metastatic disease.\par \par He is scheduled to see Dr. Lopez this morning.  Today, he reports feeling well and is without complaints. He is eager to learn more about treatment options for left lung cancer with radiation as well as surgery. \par \par 9/16/20 - Follow-up\par Mr. Franklin returns for follow-up.  He was last seen on 7/30/20.  At that time, head and neck exam showed CHEY, but recent CT chest showed an enlarging left lingula mass/consolidation.  Plan was for referral to pulmonary Dr. Jaquez for further work-up of CT findings, continue head and neck exercises, resume lymphedema therapy, SLP follow-up for occasional coughing/gagging with eating, continue routine dental f/u and PreviDent, PET/CT in 8 months.  He underwent bronchoscopy with biopsies on 9/8/20.  Pathology as follows: \par \par Lung, left lingula, biopsy and touch prep \par - Nonkeratinizing squamous cell carcinoma, see note\par - Note: Immunostain workup reveals strong positive staining for p40. The tumor cells are negative for TTF1-naspsin (duel stain).\par Staining with p16 is is patchy. Strong, diffuse staining with p16 is not observed.\par - The patient is noted to have history of squamous cell carcinoma of the oropharynx (per the 2014 report, the carcinoma was negative for HPV RODRIGUEZ 16/11, 16/18, and 31/33. p16 staining was not performed).  This current lesion may represent a primary lung squamous cell carcinoma or possible metastasis from the oropharynx. Clinical correlation advised.\par LN 11L, FNA - negative for malignant cells\par LN 4L, FNA- nondiagnostic \par LN 7, FNA - nondiagnostic\par LN 11 R, FNA - negative for malignant cells\par BAL left lingular - negative for malignant cells\par Lung, left lingula, FNA - negative for malignant cells \par \par Today, he reports feeling stable since his last visit, "nothing has changed."  He continues to have dysphagia with dry foods and occasional cough when swallowing.  He has not followed up with SLP.  He did not resume lymphedema therapy because "I just didn't do it."  He is doing head and neck exercises on his own.  He saw dental 6 weeks ago.  He is not using PreviDent.  He had a basal cell carcinoma removed from his left ear a few weeks ago.  He denies pain, odynophagia, dyspnea, cough.  \par \par 7/30/20- Follow-up\par Mr. Franklin returns for routine follow-up.  He had a telephone follow-up on 4/28/20.  Plan was for CT and in-office exam between June and August post acute phase of COVID, f/u with Dr. Wilson, dental and PMD.  \par \par CT neck with contrast done 7/14/2020:\par There remains no evidence of local or regional radha recurrence of oropharyngeal and laryngeal carcinomas, though assessment of the palate and oral cavity is limited by dental artifact. Is likely that PET/CT will be more accurate in the detection of small volume disease recurrence.\par \par CT chest with contrast done 7/14/2020: \par -Mass/consolidation seen in the superior lingula has increased in size as compared to the CT chest of 4/18/2017 and PET CT 10/25/2018. Findings can be correlated with bronchoscopy for tissue sampling. Differential diagnosis would include organizing pneumonia.\par -Other lung nodules are unchanged.\par -No adenopathy.\par \par He last saw Dr. Wilson on 6/3/20; plan is for f/u in 5-6 months.  He last saw dental a month ago.  He has not seen PCP recently (last visit was in February 2020).  Today, he notes he is stable in terms of dysphagia when eating dry foods. Also notes mild xerostomia at times, but it is not bothersome. Of note, he chipped a tooth yesterday. He also reports he had excision of basal cell carcinoma on left external ear on Monday. He saw dermatologist Dr. Saeed today for follow up and will see her again next week. He reports he does H&N exercises "fairly regularly." He denies any further complaints to include fever, chills, cough, CP, SOB. \par \par \par 4/28/20 - Follow-up by Telephone due to COVID-19 (Patient has no video capability)\par Mr. Franklin is being seen via telehealth for routine follow-up.  He was last seen on 11/14/19.  Plan was for CT neck and chest in April, continue follow-up with dental, PMD, and Dr. Álvarez, continue PreviDent, head and neck exercises, alcohol reduction.  \par He was seen by his ENT in [place] in February who examined him and said he was cancer-free. He was given a report to send to us. \par Today, he feels stable. Throat is not getting worse. He also recently cancelled an in-person appt with Dr. Wilson. He continues to do his H&N exercises. He last saw Namrata Vicente OT in November.\par \par 11/14/19 - Follow-up \par Mr. Franklin returns today for routine follow-up.  He was last seen on 5/2/19.  Plan was for xerostomia strategies, head and neck exercises, OT for lymphedema therapy, continue f/u with dental and PMD, f/u SLP for any worsening symptoms, f/u with GI for endoscopies PRN.  He was also advised on alcohol reduction and routine health maintenance.  He last saw dental in July, will follow-up again next week.  He last saw his PMD one week ago.  Today, he reports feeling well.  He notes stable dysphagia and odynophagia with dry foods and stable xerostomia.  He denies other complaints to include pain, dyspnea, chest pain and palpitations.  He does see Namrata Vicente OT for lymphedema therapy.  He denies smoking.  Drinks a glass of wine with dinner most nights.  \par Will be traveling to [place] for winter.\par \par 5/2/19 - Follow-up\par Mr. Franklin returns today for routine follow-up.  He was last seen here on 11/5/18.  Plan at that time was for repeat CT neck in 4 months, f/u SLP, f/u OT for lymphedema treatment, f/u with dental.  Self-care measures (xerostomia management, head and neck exercises) also reviewed at that time.  He spent most of the winter in [place] and is now back in town. \par \par CT neck with contrast 4/23/19 - Impression: No evidence of local or regional radha recurrence of squamous cell carcinoma.  \par \par He last saw SLP in October 2018. He last saw dental in April.  He last saw Dr. Wilson on 4/9/19.  He reports having been started on synthroid by his PMD approximately 6 months ago.  He is scheduled to follow-up with PMD in 1 week.  Today, he reports feeling well.  He continues to have xerostomia.  He is using baking soda rinses with little relief.  He also reports dysphagia and odynophagia that he relates to xerostomia, worst with dry foods.  He states he is eating 3 full meals per day, but tries to eat soft foods that are easier to swallow.  He states he has stopped doing head and neck exercises. He drinks 1-2 glasses of wine most nights with dinner. He feels his GI complaints (dry food getting stuck in lower neck) are stable and he does not need GI follow up. Has used PPI in past with no relief. Has a GI doc locally who has done endoscopies 3 years ago. \par \par \par 11/5/18-Follow up\par At his last visit he complained of epigastric burning and dysphagia. \par PET/CT showed some streak uptake in soft palate with plan to repeat  PET/CT in 3 months.  He f/u with SLP 10/16/18 for dysphagia therapy. He also followed up with Dr. Wilson 9/18/18 and complained of lower chest discomfort upon eating. Barium swallow showed no evidence of esophageal dysmotility, no mass, stricture or other esophageal abnormality was seen.\par \par PET/CT 10/25/18\par IMPRESSION: \par 1. Only mild residual activity along the left side the hard palate, unchanged since 7/25/2018. Faint activity previously seen along the right oropharyngeal wall is no longer present. No regional or distal hypermetabolic lymphadenopathy. \par 2. Continued low-level activity within an area of atelectasis versus scarring in the left upper lobe lingula (maximum SUV 2.8), relatively unchanged since 7/25/2018 however moderately improved/reduced since 3/24/2016. \par \par He was seen by Flaquito Arellano on 10/16/18: "patient underwent a repeat modified barium swallow study on 9/18/18.  Exam showed a mild pharyngeal dysphagia with penetration on cup sips of thin liquid with trace silent aspiration on initial trial, as well as mild pharyngeal residue after the swallow.  Patient recommended to continue on mechanical soft solid diet with thin liquids with periodic throat clearing to reduce risk of aspiration and continuation of dysphagia therapy."\par \par Today he states that he feels generally well. He states that he continues to have intermittent epigastric pain with swallowing various foods. He eats mainly soft textured foods. He notes dry mouth for which he uses bicarbonate rinses . He does H&N exercises. He has followed up with his PMD and has been seeing his dentist regularly. He does not want to use a PPI or see GI for further workup of his epigastric discomfort.\par \par \par 8/2/18\par Mr. Fer Franklin presents today for routine follow up and to review PET/CT results. 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 He was found to have recurrence in the Rt soft palate, Opx 1/2018, and completed 7000 cGy to the oropharynx from 3/12/18-4/27/18 for treatment of recurrent early stage oral SCC.\par \par He last saw us 7/10/18 and was CHEY on exam. He was advised to resume PPI for GERD symptoms, discontinue gabapentin and continue dental care and rinses for dry mouth\par \par PET scan on 7/25/18 showed improvement in right oropharyngeal and hard palate abnormality. There were small residual foci of increased metabolic activity remaining in the right lateral oropharynx and on the left side of the hard palate. There was a decrease in the size of a left lung nodule associated with bronchiectasis and scarring, probably representing post inflammatory change. No change in a few sclerotic bone lesions. \par \par Today he feels generally well. He notes burning epigastric pain with swallowing 3/10. He has f/u with SLP for dysphagia with recommendation to continue H&N/swallowing exercises with plan to do modified barium swallow in no improvement at next f/u in 4-6 weeks. he has also f/u with Dr. Wilson and was found to be doing well.\par \par \par ONCOLOGY HISTORY\par Mr. 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 He now returns with a NEW biopsy proven SCC of the Right soft palate, hard palate, and RMT that is outside of prior radiation field.\par \par He is s/p concurrent radiation therapy with chemotherapy completed 6/23/16 with curative intent. Treatment was directed at the supraglottic larynx, vallecula/tongue base, and bilateral necks. Chemotherapy was weekly cisplatin given by Dr. Dowell.\par \par Mr. 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 Wilson. Pathology revealed infiltrating squamous cell carcinoma, depth of invasion 2mm, arising in ulcerated inflamed mucosa with high grade dysplasia.  The inferior margin showed squamous mucosa, positive for high grade dysplasia. Anterior margin showed focally disrupted squamous mucosa negative for dysplasia, and dislodged fragment of high grade dysplasia. Second right oropharynx showed at least high grade dysplasia, suspicious for superficial invasion, and the tumor of the right soft palate showed high grade dysplasia, with brisk underlying lymphoplasmacytic infiltrate. \par \par Mr. Franklin presents today for further consideration for radiation therapy. He still has significant throat pain after surgery. He is not eating and drinking well because of this. He is eating mostly papaya, and notes he has lost 10 pounds since surgery as judged by his home scale. He has a slight cough. His energy levels are significantly decreased, and he has felt like he has a fever on and off. He is not taking tylenol despite significant pain at the surgical site.\par \par He continues with head and neck exercises, and sees a dentist every 4 months. He is not using flouride toothpaste. \par He has dry mouth as well. \par \par TREATMENT SUMMARY: \par Treatment Site: Larynx, Oropharynx, Necks \par Total dose 7,000cGy / 35 fractions with dose-painting\par Treatment Dates: 5/05/2016 to 6/23/2016\par \par He did not have any unexpected treatment complications during the course of treatment.  He tolerated treatment exceptionally well and did not require narcotic pain medications. He maintained an adequate weight and did not have any breaks in radiation or chemotherapy. \par \par ONCOLOGIC HISTORY: \par Mr. 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 He was monitored every 3 months in [place] by Dr. Álvarez and also in [place] by Dr. Bao Sanders. His visit to Dr. Leon in February 2016 was notable for a concerning lesion arising at the right vallecula/right lateral pharyngeal wall. This was separate from the previously treated lesion in the left AE fold that had been removed by laser. On March 2, 2016, Dr. Leon biopsied the right vallecula which showed squamous proliferative papillary lesion with high-grade dysplasia, and the left aryepiglottic fold which showed squamous cell carcinoma. Pathology review at [facility] confirmed the diagnosis of SCC in the left AE fold and squamous dysplasia with high-grade features in the right vallecula. PET/CT scan showed a maximum SUV of 9.2 in the right vallecular area as well as a 1.6 cm left cervical lymph node with maximum SUV of 9.8. FNA of the left neck node on 4/7/16 showed SCC.  \par \par 1/29/18- Mr. 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 He now returns with a NEW biopsy proven SCC of the Right soft palate, hard palate, and RMT that is outside of prior radiation field.\par He is s/p concurrent radiation therapy with chemotherapy completed 6/23/16 with curative intent. Treatment was directed at the supraglottic larynx, vallecula/tongue base, and bilateral necks. Chemotherapy was weekly cisplatin given by Dr. Dowell.\par \par Mr. Franklin underwent biopsy of Rt. retromolar trigone erythroleukoplakia on 12/15/17, which revealed severe dysplaia with focal ulceration, extending up to biopsy margins.\par On 1/23/18, the patient underwent biopsy of right soft palate, right oropharynx, and right piriform sinus with Dr. Wilson. Pathology revealed infiltrating squamous cell carcinoma, depth of invasion 2mm, arising in ulcerated inflamed mucosa with high grade dysplasia.  The inferior margin showed squamous mucosa, positive for high grade dysplasia. Anterior margin showed focally disrupted squamous mucosa negative for dysplasia, and dislodged fragment of high grade dysplasia. Second right oropharynx showed at least high grade dysplasia, suspicious for superficial invasion, and the tumor of the right soft palate showed high grade dysplasia, with brisk underlying lymphoplasmacytic infiltrate. \par \par At the time of initial re-consult for radiation therapy Mr. Franklin still had significant throat pain after surgery.\par \par 5/22/18- Mr. Fer Franklin presents today for post treatment evaluation.\par His weight is up two pounds from his last week of treatment today. Appetite is good, however he needs to make a conscious effort to eat and swallow. Most of his mouth has healed well, however he feels he still has pain to his right lower mouth. \par He has trouble swallowing dry things, and needs to sip extra water when he eats them. Taste sensation intact. Not using prevident because it burns too much. DOing head and neck exercises, and planning to make an appointment with flaquito daniel speech and swallow. Energy is good. Thick saliva is improving. \par \par 7/10/18- Mr. Franklin feels well. Notes some discomfort with swallowing. Started thyroid medication, following with speech and swallow. Able to swallow liquids, trouble with dry foods.

## 2020-09-25 NOTE — ASSESSMENT
[FreeTextEntry1] : 80 year old male, never smoker, with a PMHx hypothyroidism,  SCC of the L supraglottic larynx treated w chemo/radiation in 2016 and recurred in the R soft palate, operated 1/2018 and completed RT 2018. Trouble swallowing since that time, eats a normal diet but sometimes aspirates and coughs . 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 \par \par Today the patient reports feeling generally well. He admits to a cough when eating that has occurred since his RT. He denies SOB, decreased appetite, hemoptysis and fever. Thoracic Tumor Board consensus on 9/24/20: was reviewed which was that it looks like a  primary lung cancer and that a lobectomy is recommended, pending PFTs. Will order PFTs. \par \par I discussed that this left upper lobe nodule is biopsy proven. The pathology from the lung biopsy was compared to the pathology from his previous cancer and this appears to be a new primary lung cancer. Surgical resection is advised and will give the best long term outcomes. If he does choose to undergo surgery, we will be able to evaluate the lymph nodes and properly stage the cancer. Explained that RT is a great option if we are confident the lymph nodes are not involved. There is no radiographic evidence that LN's are not involved, however we  will not know for sure if this is the case unless surgery is done. \par \par Patients airway is narrow from previous RT. We will take this into consideration if he chooses to undergo surgery. Presuming PFTs are good, will schedule for surgery when the patient is ready.\par \par The patient was counseled on the risks, benefits and alternatives of proceeding with lung resection. Specifically, the risk of bleeding, need for a blood transfusion, DVT, pulmonary embolism, pneumonia, postoperative respiratory insufficiency, postoperative ventilator support, as well as prolonged air leak, need for chest drainage, dysrhythmia, myocardial infarction, postoperative pain syndrome, need for adjuvant therapy, risk of recurrence, need for surveillance, conversion to an open procedure, as well as mortality was discussed with the patient who understands and agrees to the above. \par \par PLAN:\par 1. PFTs\par 2. Schedule for lobectomy once patient is ready \par \par \par \par

## 2020-09-25 NOTE — HISTORY OF PRESENT ILLNESS
[FreeTextEntry1] : 80 year old male, never smoker, with a PMHx hypothyroidism,  SCC of the L supraglottic larynx treated w chemo/radiation in 2016 and recurred in the R soft palate, operated 1/2018 and completed RT 2018. Trouble swallowing since that time, eats a normal diet but sometimes aspirates and coughs . 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 He is referred by Dr. Pandya for an initial evaluation.\par \par CT chest done on 7/15/20:\par - Mass/consolidation seen in the superior lingula has increased in size as compared to the CT chest of 4/18/2017 and PET CT 10/25/2018. Findings can be correlated with bronchoscopy for tissue sampling. Differential diagnosis would include organizing pneumonia. Other lung nodules are unchanged. No adenopathy.\par \par Lung, left lingula, biopsy and touch prep: on 9/1/20:\par -Nonkeratinizing squamous cell carcinoma, see note\par Note: Immunostain workup reveals strong positive staining for\par p40. The tumor cells are negative for TTF1-naspsin (duel stain).\par Staining with p16 is is patchy. Strong, diffuse staining with p16\par is not observed.\par - nodes and BAL negative for malignancy \par \par \par \par PET/CT 9/21/20\par - FDG avid left lingular mass corresponding to patient's known malignancy.\par - Focal FDG uptake adjacent to the right maxillary first and third molars which may represent periodontal disease, posttreatment changes or neoplasm and correlation with physical examination is recommended.\par - No additional sites of abnormal FDG uptake to suggest biologic tumor activity.\par \par MRI brain 9/21/20\par - No evidence of intracranial metastatic disease.\par \par Thoracic Tumor Board consensus on 9/24/20:\par - looks like primary lung\par - needs full PFTs\par - lobectomy recommended\par \par

## 2020-09-30 LAB
CULTURE RESULTS: SIGNIFICANT CHANGE UP
SPECIMEN SOURCE: SIGNIFICANT CHANGE UP

## 2020-10-06 ENCOUNTER — LABORATORY RESULT (OUTPATIENT)
Age: 80
End: 2020-10-06

## 2020-10-08 ENCOUNTER — APPOINTMENT (OUTPATIENT)
Dept: HEART AND VASCULAR | Facility: CLINIC | Age: 80
End: 2020-10-08
Payer: MEDICARE

## 2020-10-08 VITALS
WEIGHT: 136 LBS | BODY MASS INDEX: 18.42 KG/M2 | HEIGHT: 72 IN | SYSTOLIC BLOOD PRESSURE: 168 MMHG | HEART RATE: 66 BPM | DIASTOLIC BLOOD PRESSURE: 82 MMHG

## 2020-10-08 VITALS — TEMPERATURE: 98 F

## 2020-10-08 DIAGNOSIS — I89.0 LYMPHEDEMA, NOT ELSEWHERE CLASSIFIED: ICD-10-CM

## 2020-10-08 PROCEDURE — 93000 ELECTROCARDIOGRAM COMPLETE: CPT | Mod: NC

## 2020-10-08 PROCEDURE — 99204 OFFICE O/P NEW MOD 45 MIN: CPT | Mod: 25

## 2020-10-08 PROCEDURE — 93306 TTE W/DOPPLER COMPLETE: CPT

## 2020-10-08 PROCEDURE — ZZZZZ: CPT

## 2020-10-09 ENCOUNTER — OUTPATIENT (OUTPATIENT)
Dept: OUTPATIENT SERVICES | Facility: HOSPITAL | Age: 80
LOS: 1 days | End: 2020-10-09
Payer: MEDICARE

## 2020-10-09 DIAGNOSIS — Z90.89 ACQUIRED ABSENCE OF OTHER ORGANS: Chronic | ICD-10-CM

## 2020-10-09 DIAGNOSIS — C32.3 MALIGNANT NEOPLASM OF LARYNGEAL CARTILAGE: Chronic | ICD-10-CM

## 2020-10-09 DIAGNOSIS — Z90.49 ACQUIRED ABSENCE OF OTHER SPECIFIED PARTS OF DIGESTIVE TRACT: Chronic | ICD-10-CM

## 2020-10-09 DIAGNOSIS — Z41.9 ENCOUNTER FOR PROCEDURE FOR PURPOSES OTHER THAN REMEDYING HEALTH STATE, UNSPECIFIED: Chronic | ICD-10-CM

## 2020-10-09 DIAGNOSIS — R91.8 OTHER NONSPECIFIC ABNORMAL FINDING OF LUNG FIELD: ICD-10-CM

## 2020-10-09 PROBLEM — I89.0 LYMPHEDEMA: Status: ACTIVE | Noted: 2017-04-19

## 2020-10-09 PROCEDURE — 94060 EVALUATION OF WHEEZING: CPT

## 2020-10-09 PROCEDURE — 94729 DIFFUSING CAPACITY: CPT | Mod: 26

## 2020-10-09 PROCEDURE — 94726 PLETHYSMOGRAPHY LUNG VOLUMES: CPT

## 2020-10-09 PROCEDURE — 94729 DIFFUSING CAPACITY: CPT

## 2020-10-09 PROCEDURE — 94726 PLETHYSMOGRAPHY LUNG VOLUMES: CPT | Mod: 26

## 2020-10-09 PROCEDURE — 94010 BREATHING CAPACITY TEST: CPT | Mod: 26

## 2020-10-09 PROCEDURE — 94760 N-INVAS EAR/PLS OXIMETRY 1: CPT

## 2020-10-17 ENCOUNTER — LABORATORY RESULT (OUTPATIENT)
Age: 80
End: 2020-10-17

## 2020-10-19 ENCOUNTER — NON-APPOINTMENT (OUTPATIENT)
Age: 80
End: 2020-10-19

## 2020-10-19 VITALS
DIASTOLIC BLOOD PRESSURE: 70 MMHG | RESPIRATION RATE: 16 BRPM | OXYGEN SATURATION: 98 % | WEIGHT: 134.04 LBS | HEART RATE: 74 BPM | HEIGHT: 71 IN | TEMPERATURE: 99 F | SYSTOLIC BLOOD PRESSURE: 127 MMHG

## 2020-10-19 RX ORDER — POTASSIUM CITRATE MONOHYDRATE 100 %
1 POWDER (GRAM) MISCELLANEOUS
Qty: 0 | Refills: 0 | DISCHARGE

## 2020-10-19 RX ORDER — TAMSULOSIN HYDROCHLORIDE 0.4 MG/1
1 CAPSULE ORAL
Qty: 0 | Refills: 0 | DISCHARGE

## 2020-10-22 LAB — SARS-COV-2 N GENE NPH QL NAA+PROBE: NOT DETECTED

## 2020-10-23 ENCOUNTER — LABORATORY RESULT (OUTPATIENT)
Age: 80
End: 2020-10-23

## 2020-10-25 ENCOUNTER — TRANSCRIPTION ENCOUNTER (OUTPATIENT)
Age: 80
End: 2020-10-25

## 2020-10-26 ENCOUNTER — INPATIENT (INPATIENT)
Facility: HOSPITAL | Age: 80
LOS: 2 days | Discharge: HOME | DRG: 164 | End: 2020-10-29
Attending: SPECIALIST | Admitting: SPECIALIST
Payer: MEDICARE

## 2020-10-26 ENCOUNTER — APPOINTMENT (OUTPATIENT)
Dept: THORACIC SURGERY | Facility: HOSPITAL | Age: 80
End: 2020-10-26

## 2020-10-26 ENCOUNTER — RESULT REVIEW (OUTPATIENT)
Age: 80
End: 2020-10-26

## 2020-10-26 DIAGNOSIS — C34.92 MALIGNANT NEOPLASM OF UNSPECIFIED PART OF LEFT BRONCHUS OR LUNG: ICD-10-CM

## 2020-10-26 DIAGNOSIS — Z88.8 ALLERGY STATUS TO OTHER DRUGS, MEDICAMENTS AND BIOLOGICAL SUBSTANCES: ICD-10-CM

## 2020-10-26 DIAGNOSIS — Z91.040 LATEX ALLERGY STATUS: ICD-10-CM

## 2020-10-26 DIAGNOSIS — R06.89 OTHER ABNORMALITIES OF BREATHING: ICD-10-CM

## 2020-10-26 DIAGNOSIS — Z85.21 PERSONAL HISTORY OF MALIGNANT NEOPLASM OF LARYNX: ICD-10-CM

## 2020-10-26 DIAGNOSIS — Z41.9 ENCOUNTER FOR PROCEDURE FOR PURPOSES OTHER THAN REMEDYING HEALTH STATE, UNSPECIFIED: Chronic | ICD-10-CM

## 2020-10-26 DIAGNOSIS — Z90.89 ACQUIRED ABSENCE OF OTHER ORGANS: Chronic | ICD-10-CM

## 2020-10-26 DIAGNOSIS — I48.91 UNSPECIFIED ATRIAL FIBRILLATION: ICD-10-CM

## 2020-10-26 DIAGNOSIS — Z90.49 ACQUIRED ABSENCE OF OTHER SPECIFIED PARTS OF DIGESTIVE TRACT: Chronic | ICD-10-CM

## 2020-10-26 DIAGNOSIS — I10 ESSENTIAL (PRIMARY) HYPERTENSION: ICD-10-CM

## 2020-10-26 DIAGNOSIS — Z92.3 PERSONAL HISTORY OF IRRADIATION: ICD-10-CM

## 2020-10-26 DIAGNOSIS — E03.9 HYPOTHYROIDISM, UNSPECIFIED: ICD-10-CM

## 2020-10-26 DIAGNOSIS — C32.3 MALIGNANT NEOPLASM OF LARYNGEAL CARTILAGE: Chronic | ICD-10-CM

## 2020-10-26 DIAGNOSIS — Z92.21 PERSONAL HISTORY OF ANTINEOPLASTIC CHEMOTHERAPY: ICD-10-CM

## 2020-10-26 DIAGNOSIS — R63.6 UNDERWEIGHT: ICD-10-CM

## 2020-10-26 LAB
ANION GAP SERPL CALC-SCNC: 12 MMOL/L — SIGNIFICANT CHANGE UP (ref 5–17)
BASOPHILS # BLD AUTO: 0.02 K/UL — SIGNIFICANT CHANGE UP (ref 0–0.2)
BASOPHILS NFR BLD AUTO: 0.2 % — SIGNIFICANT CHANGE UP (ref 0–2)
BLD GP AB SCN SERPL QL: NEGATIVE — SIGNIFICANT CHANGE UP
BLD GP AB SCN SERPL QL: NEGATIVE — SIGNIFICANT CHANGE UP
BUN SERPL-MCNC: 15 MG/DL — SIGNIFICANT CHANGE UP (ref 7–23)
CALCIUM SERPL-MCNC: 9 MG/DL — SIGNIFICANT CHANGE UP (ref 8.4–10.5)
CHLORIDE SERPL-SCNC: 100 MMOL/L — SIGNIFICANT CHANGE UP (ref 96–108)
CO2 SERPL-SCNC: 27 MMOL/L — SIGNIFICANT CHANGE UP (ref 22–31)
CREAT SERPL-MCNC: 0.65 MG/DL — SIGNIFICANT CHANGE UP (ref 0.5–1.3)
EOSINOPHIL # BLD AUTO: 0.01 K/UL — SIGNIFICANT CHANGE UP (ref 0–0.5)
EOSINOPHIL NFR BLD AUTO: 0.1 % — SIGNIFICANT CHANGE UP (ref 0–6)
GAS PNL BLDA: SIGNIFICANT CHANGE UP
GAS PNL BLDA: SIGNIFICANT CHANGE UP
GLUCOSE SERPL-MCNC: 141 MG/DL — HIGH (ref 70–99)
HCT VFR BLD CALC: 33.7 % — LOW (ref 39–50)
HGB BLD-MCNC: 11 G/DL — LOW (ref 13–17)
IMM GRANULOCYTES NFR BLD AUTO: 0.4 % — SIGNIFICANT CHANGE UP (ref 0–1.5)
LYMPHOCYTES # BLD AUTO: 0.57 K/UL — LOW (ref 1–3.3)
LYMPHOCYTES # BLD AUTO: 5 % — LOW (ref 13–44)
MCHC RBC-ENTMCNC: 31.2 PG — SIGNIFICANT CHANGE UP (ref 27–34)
MCHC RBC-ENTMCNC: 32.6 GM/DL — SIGNIFICANT CHANGE UP (ref 32–36)
MCV RBC AUTO: 95.5 FL — SIGNIFICANT CHANGE UP (ref 80–100)
MONOCYTES # BLD AUTO: 0.59 K/UL — SIGNIFICANT CHANGE UP (ref 0–0.9)
MONOCYTES NFR BLD AUTO: 5.2 % — SIGNIFICANT CHANGE UP (ref 2–14)
NEUTROPHILS # BLD AUTO: 10.14 K/UL — HIGH (ref 1.8–7.4)
NEUTROPHILS NFR BLD AUTO: 89.1 % — HIGH (ref 43–77)
NRBC # BLD: 0 /100 WBCS — SIGNIFICANT CHANGE UP (ref 0–0)
PLATELET # BLD AUTO: 220 K/UL — SIGNIFICANT CHANGE UP (ref 150–400)
POTASSIUM SERPL-MCNC: 4.2 MMOL/L — SIGNIFICANT CHANGE UP (ref 3.5–5.3)
POTASSIUM SERPL-SCNC: 4.2 MMOL/L — SIGNIFICANT CHANGE UP (ref 3.5–5.3)
RBC # BLD: 3.53 M/UL — LOW (ref 4.2–5.8)
RBC # FLD: 12.1 % — SIGNIFICANT CHANGE UP (ref 10.3–14.5)
RH IG SCN BLD-IMP: POSITIVE — SIGNIFICANT CHANGE UP
RH IG SCN BLD-IMP: POSITIVE — SIGNIFICANT CHANGE UP
SODIUM SERPL-SCNC: 139 MMOL/L — SIGNIFICANT CHANGE UP (ref 135–145)
WBC # BLD: 11.37 K/UL — HIGH (ref 3.8–10.5)
WBC # FLD AUTO: 11.37 K/UL — HIGH (ref 3.8–10.5)

## 2020-10-26 PROCEDURE — 99291 CRITICAL CARE FIRST HOUR: CPT

## 2020-10-26 PROCEDURE — 71045 X-RAY EXAM CHEST 1 VIEW: CPT | Mod: 26

## 2020-10-26 PROCEDURE — 32663 THORACOSCOPY W/LOBECTOMY: CPT

## 2020-10-26 PROCEDURE — 32674 THORACOSCOPY LYMPH NODE EXC: CPT | Mod: 80

## 2020-10-26 PROCEDURE — 15650 TRANSFER SKIN PEDICLE FLAP: CPT | Mod: 80

## 2020-10-26 PROCEDURE — 32674 THORACOSCOPY LYMPH NODE EXC: CPT

## 2020-10-26 PROCEDURE — 88309 TISSUE EXAM BY PATHOLOGIST: CPT | Mod: 26

## 2020-10-26 PROCEDURE — 88331 PATH CONSLTJ SURG 1 BLK 1SPC: CPT | Mod: 26

## 2020-10-26 PROCEDURE — 31645 BRNCHSC W/THER ASPIR 1ST: CPT

## 2020-10-26 PROCEDURE — 15650 TRANSFER SKIN PEDICLE FLAP: CPT

## 2020-10-26 PROCEDURE — 88304 TISSUE EXAM BY PATHOLOGIST: CPT | Mod: 26

## 2020-10-26 PROCEDURE — 99292 CRITICAL CARE ADDL 30 MIN: CPT

## 2020-10-26 PROCEDURE — S2900 ROBOTIC SURGICAL SYSTEM: CPT | Mod: NC

## 2020-10-26 PROCEDURE — 32663 THORACOSCOPY W/LOBECTOMY: CPT | Mod: 80

## 2020-10-26 PROCEDURE — 88305 TISSUE EXAM BY PATHOLOGIST: CPT | Mod: 26

## 2020-10-26 RX ORDER — DEXTROSE 50 % IN WATER 50 %
25 SYRINGE (ML) INTRAVENOUS ONCE
Refills: 0 | Status: DISCONTINUED | OUTPATIENT
Start: 2020-10-26 | End: 2020-10-27

## 2020-10-26 RX ORDER — ACETAMINOPHEN 500 MG
1000 TABLET ORAL ONCE
Refills: 0 | Status: DISCONTINUED | OUTPATIENT
Start: 2020-10-26 | End: 2020-10-27

## 2020-10-26 RX ORDER — CEFAZOLIN SODIUM 1 G
2000 VIAL (EA) INJECTION EVERY 8 HOURS
Refills: 0 | Status: COMPLETED | OUTPATIENT
Start: 2020-10-26 | End: 2020-10-27

## 2020-10-26 RX ORDER — NICARDIPINE HYDROCHLORIDE 30 MG/1
5 CAPSULE, EXTENDED RELEASE ORAL
Qty: 40 | Refills: 0 | Status: DISCONTINUED | OUTPATIENT
Start: 2020-10-26 | End: 2020-10-27

## 2020-10-26 RX ORDER — SODIUM CHLORIDE 9 MG/ML
1000 INJECTION, SOLUTION INTRAVENOUS
Refills: 0 | Status: DISCONTINUED | OUTPATIENT
Start: 2020-10-26 | End: 2020-10-27

## 2020-10-26 RX ORDER — ALBUMIN HUMAN 25 %
250 VIAL (ML) INTRAVENOUS
Refills: 0 | Status: DISCONTINUED | OUTPATIENT
Start: 2020-10-26 | End: 2020-10-27

## 2020-10-26 RX ORDER — LIDOCAINE 4 G/100G
1 CREAM TOPICAL DAILY
Refills: 0 | Status: DISCONTINUED | OUTPATIENT
Start: 2020-10-26 | End: 2020-10-29

## 2020-10-26 RX ORDER — HEPARIN SODIUM 5000 [USP'U]/ML
5000 INJECTION INTRAVENOUS; SUBCUTANEOUS EVERY 8 HOURS
Refills: 0 | Status: DISCONTINUED | OUTPATIENT
Start: 2020-10-26 | End: 2020-10-29

## 2020-10-26 RX ORDER — DEXTROSE 50 % IN WATER 50 %
12.5 SYRINGE (ML) INTRAVENOUS ONCE
Refills: 0 | Status: DISCONTINUED | OUTPATIENT
Start: 2020-10-26 | End: 2020-10-27

## 2020-10-26 RX ORDER — GLUCAGON INJECTION, SOLUTION 0.5 MG/.1ML
1 INJECTION, SOLUTION SUBCUTANEOUS ONCE
Refills: 0 | Status: DISCONTINUED | OUTPATIENT
Start: 2020-10-26 | End: 2020-10-29

## 2020-10-26 RX ORDER — FAMOTIDINE 10 MG/ML
20 INJECTION INTRAVENOUS DAILY
Refills: 0 | Status: DISCONTINUED | OUTPATIENT
Start: 2020-10-26 | End: 2020-10-26

## 2020-10-26 RX ORDER — ACETAMINOPHEN 500 MG
1000 TABLET ORAL ONCE
Refills: 0 | Status: COMPLETED | OUTPATIENT
Start: 2020-10-27 | End: 2020-10-27

## 2020-10-26 RX ORDER — LEVOTHYROXINE SODIUM 125 MCG
0 TABLET ORAL
Qty: 0 | Refills: 0 | DISCHARGE

## 2020-10-26 RX ORDER — DEXTROSE 50 % IN WATER 50 %
15 SYRINGE (ML) INTRAVENOUS ONCE
Refills: 0 | Status: DISCONTINUED | OUTPATIENT
Start: 2020-10-26 | End: 2020-10-27

## 2020-10-26 RX ORDER — DOXAZOSIN MESYLATE 4 MG
2 TABLET ORAL AT BEDTIME
Refills: 0 | Status: DISCONTINUED | OUTPATIENT
Start: 2020-10-26 | End: 2020-10-29

## 2020-10-26 RX ORDER — FENTANYL CITRATE 50 UG/ML
25 INJECTION INTRAVENOUS
Refills: 0 | Status: DISCONTINUED | OUTPATIENT
Start: 2020-10-26 | End: 2020-10-26

## 2020-10-26 RX ORDER — INSULIN LISPRO 100/ML
VIAL (ML) SUBCUTANEOUS
Refills: 0 | Status: DISCONTINUED | OUTPATIENT
Start: 2020-10-26 | End: 2020-10-27

## 2020-10-26 RX ORDER — ACETAMINOPHEN 500 MG
1000 TABLET ORAL ONCE
Refills: 0 | Status: COMPLETED | OUTPATIENT
Start: 2020-10-26 | End: 2020-10-26

## 2020-10-26 RX ORDER — FENTANYL CITRATE 50 UG/ML
12.5 INJECTION INTRAVENOUS
Refills: 0 | Status: DISCONTINUED | OUTPATIENT
Start: 2020-10-26 | End: 2020-10-27

## 2020-10-26 RX ORDER — LEVOTHYROXINE SODIUM 125 MCG
50 TABLET ORAL DAILY
Refills: 0 | Status: DISCONTINUED | OUTPATIENT
Start: 2020-10-27 | End: 2020-10-29

## 2020-10-26 RX ORDER — BUPIVACAINE 13.3 MG/ML
20 INJECTION, SUSPENSION, LIPOSOMAL INFILTRATION ONCE
Refills: 0 | Status: DISCONTINUED | OUTPATIENT
Start: 2020-10-26 | End: 2020-10-26

## 2020-10-26 RX ORDER — HYDROMORPHONE HYDROCHLORIDE 2 MG/ML
0.25 INJECTION INTRAMUSCULAR; INTRAVENOUS; SUBCUTANEOUS ONCE
Refills: 0 | Status: DISCONTINUED | OUTPATIENT
Start: 2020-10-26 | End: 2020-10-26

## 2020-10-26 RX ADMIN — Medication 400 MILLIGRAM(S): at 18:30

## 2020-10-26 RX ADMIN — NICARDIPINE HYDROCHLORIDE 25 MG/HR: 30 CAPSULE, EXTENDED RELEASE ORAL at 21:52

## 2020-10-26 RX ADMIN — Medication 1000 MILLIGRAM(S): at 18:41

## 2020-10-26 RX ADMIN — Medication 100 MILLIGRAM(S): at 21:51

## 2020-10-26 RX ADMIN — HEPARIN SODIUM 5000 UNIT(S): 5000 INJECTION INTRAVENOUS; SUBCUTANEOUS at 21:51

## 2020-10-26 RX ADMIN — HYDROMORPHONE HYDROCHLORIDE 0.25 MILLIGRAM(S): 2 INJECTION INTRAMUSCULAR; INTRAVENOUS; SUBCUTANEOUS at 17:25

## 2020-10-26 RX ADMIN — Medication 2 MILLIGRAM(S): at 22:20

## 2020-10-26 NOTE — BRIEF OPERATIVE NOTE - NSICDXBRIEFPROCEDURE_GEN_ALL_CORE_FT
PROCEDURES:  Bronchoscopy, with robot-assisted lobectomy 26-Oct-2020 16:49:13 LVAT, left upper lobectomy with MLND, and Thymic flap Berenice Greene V

## 2020-10-26 NOTE — H&P ADULT - NSHPPHYSICALEXAM_GEN_ALL_CORE
Appearance: No acute distress.  Neurologic: AAOx3, no AMS or focal deficits.  Responds appropriately to verbal and physical stimuli; exhibits purposeful movement in all extremities.  HEENT:   MMM  Neck: Supple  Respiratory: No acute respiratory distress.    Gastrointestinal:  Soft, non-tender, non-distended	  Skin: No rashes. No ecchymoses. No cyanosis.  Extremities: warm, well perfused. No edema.

## 2020-10-26 NOTE — H&P ADULT - HISTORY OF PRESENT ILLNESS
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 Trouble swallowing since that time, eats a normal diet but sometimes aspirates and coughs . 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 He was referred by Dr. Pandya. DIscussed at thoracic Tumor Board and decision was made to proceed with left upper lobectomy. He presents today to preop holding for bronchoscopy, LVATS, robotic assisted, left upper lobectomy and MLND. He has been NPO since yesterday. He took only levothyroxine this AM. Plan to proceed with surgery. He denies fever, chills, N/V/D, recent sick contacts, headache, syncope, chest pain, palpitations.

## 2020-10-26 NOTE — H&P ADULT - NSICDXPASTMEDICALHX_GEN_ALL_CORE_FT
PAST MEDICAL HISTORY:  HTN (hypertension)     Squamous cell carcinoma lung     Throat cancer R/T and chemo

## 2020-10-26 NOTE — PACU DISCHARGE NOTE - COMMENTS
Patient aaox 3 , with chest tube in left upper chest in place with noted airleak with order for immediate transfer to [facility], transferred as ordered monitored accompanied by CT PA

## 2020-10-26 NOTE — H&P ADULT - NSICDXPASTSURGICALHX_GEN_ALL_CORE_FT
PAST SURGICAL HISTORY:  Carcinoma of laryngeal cartilages     Elective surgery Biopsy of Larynx    Elective surgery Lymph Nodes Removal of the Neck    History of tonsillectomy     S/P appendectomy     Surgery, elective mouth sx

## 2020-10-26 NOTE — PROGRESS NOTE ADULT - SUBJECTIVE AND OBJECTIVE BOX
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 He has been having trouble swallowing since that time, eats a normal diet but sometimes aspirates and coughs .   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   He was referred by Dr. Pandya, Case discussed at thoracic Tumor Board and decision was made to proceed with left upper lobectomy.   He presents today to preop holding for bronchoscopy, LVATS, robotic assisted, left upper lobectomy and MLND.   He has been NPO since yesterday. He took only levothyroxine this AM. Plan to proceed with surgery.   He denies fever, chills, N/V/D, recent sick contacts, headache, syncope, chest pain, palpitations.  S/P left upper lobectomy.        FAMILY HISTORY:  PAST MEDICAL & SURGICAL HISTORY:  Squamous cell carcinoma lung  HTN (hypertension)  Throat cancer  R/T and chemo  Surgery, elective mouth sx  Elective surgery  Lymph Nodes Removal of the Neck  Elective surgery  Biopsy of Larynx  Carcinoma of laryngeal cartilages  S/P appendectomy  History of tonsillectomy        14 system review was unremarkable      Vital signs, hemodynamic and respiratory parameters were reviewed from the bedside nursing flow sheet.  ICU Vital Signs Last 24 Hrs  T(C): 36.4 (26 Oct 2020 21:13), Max: 36.4 (26 Oct 2020 21:13)  T(F): 97.6 (26 Oct 2020 21:13), Max: 97.6 (26 Oct 2020 21:13)  HR: 89 (26 Oct 2020 22:00) (75 - 89)  BP: 173/85 (26 Oct 2020 20:00) (123/59 - 173/85)  BP(mean): 122 (26 Oct 2020 20:00) (84 - 122)  ABP: 155/68 (26 Oct 2020 22:00) (135/58 - 176/76)  ABP(mean): 101 (26 Oct 2020 22:00) (89 - 118)  RR: 12 (26 Oct 2020 22:00) (12 - 28)  SpO2: 100% (26 Oct 2020 22:00) (96% - 100%)    Adult Advanced Hemodynamics Last 24 Hrs  CVP(mm Hg): --  CVP(cm H2O): --  CO: --  CI: --  PA: --  PA(mean): --  PCWP: --  SVR: --  SVRI: --  PVR: --  PVRI: --, ABG - ( 26 Oct 2020 19:50 )  pH, Arterial: 7.38  pH, Blood: x     /  pCO2: 44    /  pO2: 155   / HCO3: 26    / Base Excess: 0.6   /  SaO2: 99                  Intake and output was reviewed and the fluid balance was calculated  Daily     Daily   I&O's Summary    26 Oct 2020 07:01  -  26 Oct 2020 23:19  --------------------------------------------------------  IN: 400 mL / OUT: 100 mL / NET: 300 mL        All lines and drain sites were assessed      Neuro: Follows commands. Moves all 4 extremities.  Neck: No JVD.  CVS: S1, S2, No S3.  Lungs: Good air entry bilaterally.  Abd: Soft. No tenderness. + Bowel sounds.  Vascular: + DP/PT.  Extremities: No edema.  Lymphatic: Normal.  Skin: No abnormalities.      labs  CBC Full  -  ( 26 Oct 2020 17:38 )  WBC Count : 11.37 K/uL  RBC Count : 3.53 M/uL  Hemoglobin : 11.0 g/dL  Hematocrit : 33.7 %  Platelet Count - Automated : 220 K/uL  Mean Cell Volume : 95.5 fl  Mean Cell Hemoglobin : 31.2 pg  Mean Cell Hemoglobin Concentration : 32.6 gm/dL  Auto Neutrophil # : 10.14 K/uL  Auto Lymphocyte # : 0.57 K/uL  Auto Monocyte # : 0.59 K/uL  Auto Eosinophil # : 0.01 K/uL  Auto Basophil # : 0.02 K/uL  Auto Neutrophil % : 89.1 %  Auto Lymphocyte % : 5.0 %  Auto Monocyte % : 5.2 %  Auto Eosinophil % : 0.1 %  Auto Basophil % : 0.2 %    10-26    139  |  100  |  15  ----------------------------<  141<H>  4.2   |  27  |  0.65    Ca    9.0      26 Oct 2020 17:38        The current medications were reviewed   MEDICATIONS  (STANDING):  albumin human  5% IVPB 250 milliLiter(s) IV Intermittent every 30 minutes  ceFAZolin   IVPB 2000 milliGRAM(s) IV Intermittent every 8 hours  dextrose 5%. 1000 milliLiter(s) (50 mL/Hr) IV Continuous <Continuous>  dextrose 50% Injectable 12.5 Gram(s) IV Push once  dextrose 50% Injectable 25 Gram(s) IV Push once  dextrose 50% Injectable 25 Gram(s) IV Push once  doxazosin 2 milliGRAM(s) Oral at bedtime  heparin   Injectable 5000 Unit(s) SubCutaneous every 8 hours  insulin lispro (ADMELOG) corrective regimen sliding scale   SubCutaneous Before meals and at bedtime  lactated ringers. 1000 milliLiter(s) (50 mL/Hr) IV Continuous <Continuous>  lidocaine   Patch 1 Patch Transdermal daily  niCARdipine Infusion 5 mG/Hr (25 mL/Hr) IV Continuous <Continuous>    MEDICATIONS  (PRN):  acetaminophen  IVPB .. 1000 milliGRAM(s) IV Intermittent once PRN Temp greater or equal to 38C (100.4F), Mild Pain (1 - 3)  dextrose 40% Gel 15 Gram(s) Oral once PRN Blood Glucose LESS THAN 70 milliGRAM(s)/deciliter  fentaNYL    Injectable 12.5 MICROGram(s) IV Push every 3 hours PRN Severe Pain (7 - 10)  glucagon  Injectable 1 milliGRAM(s) IntraMuscular once PRN Glucose LESS THAN 70 milligrams/deciliter          80y old  Male with lung cancer.  S/P Left upper lobectomy  S/P mediastinal lymph node dissection.  S/P Bronchoscopy  Hemodynamically stable.  Good oxygenation.  Fair urine out put.  Overall doing well.      My plan includes :  BRONCHODILATORS   Thyroid Replacement Rx.  Statin and Betablocker.  Close hemodynamic, ventilatory and drain monitoring and management  Monitor for arrhythmias and monitor parameters for organ perfusion  Monitor neurologic status  Monitor renal function.  Head of the bed should remain elevated to 45 deg .   Chest PT and IS will be encouraged  Monitor adequacy of oxygenation and ventilation and attempt to wean oxygen  Nutritional goals will be met using po eventually , ensure adequate caloric intake and monitor the same  Stress ulcer and VTE prophylaxis will be achieved    Glycemic control is satisfactory  Electrolytes have been repleted as necessary and wound care has been carried out. Pain control has been achieved.   Aggressive physical therapy and early mobility and ambulation goals will be met   The family was updated about the course and plan  CRITICAL CARE TIME SPENT in evaluation and management, reassessments, review and interpretation of labs and x-rays, ventilator and hemodynamic management, formulating a plan and coordinating care: ___90____ MIN.  Time does not include procedural time.  CTICU ATTENDING     					    Richard Santiago MD

## 2020-10-27 LAB
ALBUMIN SERPL ELPH-MCNC: 3.4 G/DL — SIGNIFICANT CHANGE UP (ref 3.3–5)
ALP SERPL-CCNC: 63 U/L — SIGNIFICANT CHANGE UP (ref 40–120)
ALT FLD-CCNC: 9 U/L — LOW (ref 10–45)
ANION GAP SERPL CALC-SCNC: 11 MMOL/L — SIGNIFICANT CHANGE UP (ref 5–17)
APTT BLD: 36.8 SEC — HIGH (ref 27.5–35.5)
AST SERPL-CCNC: 21 U/L — SIGNIFICANT CHANGE UP (ref 10–40)
BILIRUB SERPL-MCNC: 0.5 MG/DL — SIGNIFICANT CHANGE UP (ref 0.2–1.2)
BUN SERPL-MCNC: 12 MG/DL — SIGNIFICANT CHANGE UP (ref 7–23)
CALCIUM SERPL-MCNC: 9.2 MG/DL — SIGNIFICANT CHANGE UP (ref 8.4–10.5)
CHLORIDE SERPL-SCNC: 95 MMOL/L — LOW (ref 96–108)
CO2 SERPL-SCNC: 30 MMOL/L — SIGNIFICANT CHANGE UP (ref 22–31)
CREAT SERPL-MCNC: 0.62 MG/DL — SIGNIFICANT CHANGE UP (ref 0.5–1.3)
GLUCOSE SERPL-MCNC: 156 MG/DL — HIGH (ref 70–99)
HCT VFR BLD CALC: 40.1 % — SIGNIFICANT CHANGE UP (ref 39–50)
HGB BLD-MCNC: 13.2 G/DL — SIGNIFICANT CHANGE UP (ref 13–17)
INR BLD: 1.1 — SIGNIFICANT CHANGE UP (ref 0.88–1.16)
LACTATE SERPL-SCNC: 2 MMOL/L — SIGNIFICANT CHANGE UP (ref 0.5–2)
MAGNESIUM SERPL-MCNC: 1.7 MG/DL — SIGNIFICANT CHANGE UP (ref 1.6–2.6)
MCHC RBC-ENTMCNC: 31.4 PG — SIGNIFICANT CHANGE UP (ref 27–34)
MCHC RBC-ENTMCNC: 32.9 GM/DL — SIGNIFICANT CHANGE UP (ref 32–36)
MCV RBC AUTO: 95.5 FL — SIGNIFICANT CHANGE UP (ref 80–100)
NRBC # BLD: 0 /100 WBCS — SIGNIFICANT CHANGE UP (ref 0–0)
PHOSPHATE SERPL-MCNC: 3.2 MG/DL — SIGNIFICANT CHANGE UP (ref 2.5–4.5)
PLATELET # BLD AUTO: 239 K/UL — SIGNIFICANT CHANGE UP (ref 150–400)
POTASSIUM SERPL-MCNC: 4.5 MMOL/L — SIGNIFICANT CHANGE UP (ref 3.5–5.3)
POTASSIUM SERPL-SCNC: 4.5 MMOL/L — SIGNIFICANT CHANGE UP (ref 3.5–5.3)
PROT SERPL-MCNC: 6.7 G/DL — SIGNIFICANT CHANGE UP (ref 6–8.3)
PROTHROM AB SERPL-ACNC: 13.1 SEC — SIGNIFICANT CHANGE UP (ref 10.6–13.6)
RBC # BLD: 4.2 M/UL — SIGNIFICANT CHANGE UP (ref 4.2–5.8)
RBC # FLD: 11.9 % — SIGNIFICANT CHANGE UP (ref 10.3–14.5)
SODIUM SERPL-SCNC: 136 MMOL/L — SIGNIFICANT CHANGE UP (ref 135–145)
WBC # BLD: 12.21 K/UL — HIGH (ref 3.8–10.5)
WBC # FLD AUTO: 12.21 K/UL — HIGH (ref 3.8–10.5)

## 2020-10-27 PROCEDURE — 99292 CRITICAL CARE ADDL 30 MIN: CPT | Mod: 25

## 2020-10-27 PROCEDURE — 99291 CRITICAL CARE FIRST HOUR: CPT | Mod: 25

## 2020-10-27 PROCEDURE — 71045 X-RAY EXAM CHEST 1 VIEW: CPT | Mod: 26

## 2020-10-27 RX ORDER — POLYETHYLENE GLYCOL 3350 17 G/17G
17 POWDER, FOR SOLUTION ORAL DAILY
Refills: 0 | Status: DISCONTINUED | OUTPATIENT
Start: 2020-10-27 | End: 2020-10-29

## 2020-10-27 RX ORDER — PANTOPRAZOLE SODIUM 20 MG/1
40 TABLET, DELAYED RELEASE ORAL
Refills: 0 | Status: DISCONTINUED | OUTPATIENT
Start: 2020-10-27 | End: 2020-10-29

## 2020-10-27 RX ORDER — MAGNESIUM SULFATE 500 MG/ML
2 VIAL (ML) INJECTION ONCE
Refills: 0 | Status: COMPLETED | OUTPATIENT
Start: 2020-10-27 | End: 2020-10-27

## 2020-10-27 RX ORDER — MAGNESIUM OXIDE 400 MG ORAL TABLET 241.3 MG
400 TABLET ORAL ONCE
Refills: 0 | Status: COMPLETED | OUTPATIENT
Start: 2020-10-27 | End: 2020-10-27

## 2020-10-27 RX ORDER — SODIUM CHLORIDE 9 MG/ML
3 INJECTION INTRAMUSCULAR; INTRAVENOUS; SUBCUTANEOUS EVERY 8 HOURS
Refills: 0 | Status: DISCONTINUED | OUTPATIENT
Start: 2020-10-27 | End: 2020-10-29

## 2020-10-27 RX ORDER — CALCIUM GLUCONATE 100 MG/ML
2 VIAL (ML) INTRAVENOUS ONCE
Refills: 0 | Status: COMPLETED | OUTPATIENT
Start: 2020-10-27 | End: 2020-10-27

## 2020-10-27 RX ADMIN — LIDOCAINE 1 PATCH: 4 CREAM TOPICAL at 18:13

## 2020-10-27 RX ADMIN — HEPARIN SODIUM 5000 UNIT(S): 5000 INJECTION INTRAVENOUS; SUBCUTANEOUS at 22:28

## 2020-10-27 RX ADMIN — HEPARIN SODIUM 5000 UNIT(S): 5000 INJECTION INTRAVENOUS; SUBCUTANEOUS at 13:41

## 2020-10-27 RX ADMIN — Medication 1000 MILLIGRAM(S): at 11:00

## 2020-10-27 RX ADMIN — Medication 200 GRAM(S): at 05:23

## 2020-10-27 RX ADMIN — LIDOCAINE 1 PATCH: 4 CREAM TOPICAL at 23:16

## 2020-10-27 RX ADMIN — SODIUM CHLORIDE 3 MILLILITER(S): 9 INJECTION INTRAMUSCULAR; INTRAVENOUS; SUBCUTANEOUS at 13:18

## 2020-10-27 RX ADMIN — Medication 2 MILLIGRAM(S): at 23:17

## 2020-10-27 RX ADMIN — SODIUM CHLORIDE 3 MILLILITER(S): 9 INJECTION INTRAMUSCULAR; INTRAVENOUS; SUBCUTANEOUS at 21:24

## 2020-10-27 RX ADMIN — Medication 50 GRAM(S): at 03:49

## 2020-10-27 RX ADMIN — Medication 400 MILLIGRAM(S): at 10:39

## 2020-10-27 RX ADMIN — Medication 100 MILLIGRAM(S): at 13:42

## 2020-10-27 RX ADMIN — Medication 100 MILLIGRAM(S): at 05:23

## 2020-10-27 RX ADMIN — HEPARIN SODIUM 5000 UNIT(S): 5000 INJECTION INTRAVENOUS; SUBCUTANEOUS at 05:23

## 2020-10-27 RX ADMIN — Medication 50 MICROGRAM(S): at 05:23

## 2020-10-27 RX ADMIN — LIDOCAINE 1 PATCH: 4 CREAM TOPICAL at 12:10

## 2020-10-27 NOTE — CHART NOTE - TREATMENT: THE FOLLOWING DIET HAS BEEN RECOMMENDED
Recommend c/w Regular diet + addition of Ensure QD (350kcal/20gm pro)    1) Encourage and monitor PO intake   2) Monitor appetite/GI distress   3) Trend weights   4) Monitor Lytes and replete prn.   5) RD to remain available prn for education

## 2020-10-27 NOTE — DIETITIAN INITIAL EVALUATION ADULT. - MALNUTRITION
at this time would not suspect malnutrition as pt noting wt has been stable, had been tolerating PO well PTA, has been on tall/ thin side, minimal s/sx of wasting observed at this time, however at risk for malnutrition d/t catabolic illness, will follow

## 2020-10-27 NOTE — DIETITIAN INITIAL EVALUATION ADULT. - ADD RECOMMEND
1) Encourage and Monitor PO intake 2) Monitor appetite/GI distress 3) Trend weights 4) Monitor Lytes and replete prn. 5) RD to remain available prn. 1) Encourage and monitor PO intake 2) Monitor appetite/GI distress 3) Trend weights 4) Monitor Lytes and replete prn. 5) RD to remain available prn for education

## 2020-10-27 NOTE — DIETITIAN INITIAL EVALUATION ADULT. - PERTINENT MEDS FT
Ancef  Insulin corrective regimen  Synthroid  Protonix  Miralax  Lidocaine Ancef  Insulin sliding scale   Synthroid  Protonix  Miralax  Lidocaine

## 2020-10-27 NOTE — DIETITIAN INITIAL EVALUATION ADULT. - OTHER CALCULATIONS
Using IBW for EER calculation since ABW <80% IBW. ABW:60.8kg IBW:78kg %IBW:75% Ht: 71" BMI: 18.7.  Nutrient needs based on [facility] standards of care for maintenance in adults, adjusted for age, cancer, post-op demand, age, Using IBW for EER calculation since ABW <80% IBW. ABW:60.8kg IBW:78kg %IBW:75% Ht: 71" BMI: 18.7.  Nutrient needs based on [facility] standards of care for maintenance in adults, adjusted for age, cancer, post-op demand, fluids per team

## 2020-10-27 NOTE — PROGRESS NOTE ADULT - SUBJECTIVE AND OBJECTIVE BOX
CTICU  CRITICAL  CARE  attending     Hand off received 					   Pertinent clinical, laboratory, radiographic, hemodynamic, echocardiographic, respiratory data, microbiologic data and chart were reviewed and analyzed frequently throughout the course of the day and night  Patient seen and examined with CTS/ SH attending at bedside  Pt is a 80y , Male, HEALTH ISSUES - PROBLEM Dx:      , FAMILY HISTORY:  PAST MEDICAL & SURGICAL HISTORY:  Squamous cell carcinoma lung    HTN (hypertension)    Throat cancer  R/T and chemo    Surgery, elective  mouth sx    Elective surgery  Lymph Nodes Removal of the Neck    Elective surgery  Biopsy of Larynx    Carcinoma of laryngeal cartilages    S/P appendectomy    History of tonsillectomy      Patient is a 80y old  Male who presents with a chief complaint of     14 system review was unremarkable    Vital signs, hemodynamic and respiratory parameters were reviewed from the bedside nursing flowsheet.  ICU Vital Signs Last 24 Hrs  T(C): 36.1 (27 Oct 2020 05:01), Max: 36.4 (26 Oct 2020 21:13)  T(F): 96.9 (27 Oct 2020 05:01), Max: 97.6 (26 Oct 2020 21:13)  HR: 85 (27 Oct 2020 08:00) (66 - 91)  BP: 154/67 (27 Oct 2020 08:00) (110/54 - 173/85)  BP(mean): 97 (27 Oct 2020 08:00) (77 - 122)  ABP: 146/57 (27 Oct 2020 00:00) (135/58 - 176/76)  ABP(mean): 83 (27 Oct 2020 01:00) (83 - 118)  RR: 18 (27 Oct 2020 08:00) (12 - 28)  SpO2: 100% (27 Oct 2020 08:00) (96% - 100%)    Adult Advanced Hemodynamics Last 24 Hrs  CVP(mm Hg): --  CVP(cm H2O): --  CO: --  CI: --  PA: --  PA(mean): --  PCWP: --  SVR: --  SVRI: --  PVR: --  PVRI: --, ABG - ( 26 Oct 2020 19:50 )  pH, Arterial: 7.38  pH, Blood: x     /  pCO2: 44    /  pO2: 155   / HCO3: 26    / Base Excess: 0.6   /  SaO2: 99                  Intake and output was reviewed and the fluid balance was calculated  Daily     Daily   I&O's Summary    26 Oct 2020 07:01  -  27 Oct 2020 07:00  --------------------------------------------------------  IN: 1050 mL / OUT: 2515 mL / NET: -1465 mL        All lines and drain sites were assessed  Glycemic trend was reviewedCAPILLARY BLOOD GLUCOSE      POCT Blood Glucose.: 118 mg/dL (27 Oct 2020 06:21)    No acute change in mental status  Auscultation of the chest reveals equal bs  Abdomen is soft  Extremities are warm and well perfused  Wounds appear clean and unremarkable  Antibiotics are periop    labs  CBC Full  -  ( 27 Oct 2020 03:10 )  WBC Count : 12.21 K/uL  RBC Count : 4.20 M/uL  Hemoglobin : 13.2 g/dL  Hematocrit : 40.1 %  Platelet Count - Automated : 239 K/uL  Mean Cell Volume : 95.5 fl  Mean Cell Hemoglobin : 31.4 pg  Mean Cell Hemoglobin Concentration : 32.9 gm/dL  Auto Neutrophil # : x  Auto Lymphocyte # : x  Auto Monocyte # : x  Auto Eosinophil # : x  Auto Basophil # : x  Auto Neutrophil % : x  Auto Lymphocyte % : x  Auto Monocyte % : x  Auto Eosinophil % : x  Auto Basophil % : x    10-27    136  |  95<L>  |  12  ----------------------------<  156<H>  4.5   |  30  |  0.62    Ca    9.2      27 Oct 2020 03:10  Phos  3.2     10-27  Mg     1.7     10-27    TPro  6.7  /  Alb  3.4  /  TBili  0.5  /  DBili  x   /  AST  21  /  ALT  9<L>  /  AlkPhos  63  10-27    PT/INR - ( 27 Oct 2020 03:10 )   PT: 13.1 sec;   INR: 1.10          PTT - ( 27 Oct 2020 03:10 )  PTT:36.8 sec  The current medications were reviewed   MEDICATIONS  (STANDING):  ceFAZolin   IVPB 2000 milliGRAM(s) IV Intermittent every 8 hours  doxazosin 2 milliGRAM(s) Oral at bedtime  heparin   Injectable 5000 Unit(s) SubCutaneous every 8 hours  insulin lispro (ADMELOG) corrective regimen sliding scale   SubCutaneous Before meals and at bedtime  levothyroxine 50 MICROGram(s) Oral daily  lidocaine   Patch 1 Patch Transdermal daily  pantoprazole    Tablet 40 milliGRAM(s) Oral before breakfast  polyethylene glycol 3350 17 Gram(s) Oral daily  sodium chloride 0.9% lock flush 3 milliLiter(s) IV Push every 8 hours    MEDICATIONS  (PRN):  acetaminophen  IVPB .. 1000 milliGRAM(s) IV Intermittent once PRN Temp greater or equal to 38C (100.4F), Mild Pain (1 - 3)  acetaminophen  IVPB .. 1000 milliGRAM(s) IV Intermittent once PRN Mild Pain (1 - 3)  glucagon  Injectable 1 milliGRAM(s) IntraMuscular once PRN Glucose LESS THAN 70 milligrams/deciliter       PROBLEM LIST/ ASSESSMENT:  HEALTH ISSUES - PROBLEM Dx:      ,   Patient is a 80y old  Male who presents with a chief complaint of    s/p thor surgery                My plan includes :  close hemodynamic, ventilatory and drain monitoring and management per post op routine    Monitor for arrhythmias and monitor parameters for organ perfusion  beta blockade not administered due to hemodynamic instability and bradycardia  monitor neurologic status  Head of the bed should remain elevated to 45 deg .   chest PT and IS will be encouraged  monitor adequacy of oxygenation and ventilation and attempt to wean oxygen  antibiotic regimen will be tailored to the clinical, laboratory and microbiologic data  Nutritional goals will be met using po eventually , ensure adequate caloric intake and montior the same  Stress ulcer and VTE prophylaxis will be achieved    Glycemic control is satisfactory  Electrolytes have been repleted as necessary and wound care has been carried out. Pain control has been achieved.   agressive physical therapy and early mobility and ambulation goals will be met   The family was updated about the course and plan  CRITICAL CARE TIME personally provided by me  in evaluation and management, reassessments, review and interpretation of labs and x-rays, ventilator and hemodynamic management, formulating a plan and coordinating care: ___90____ MIN.  Time does not include procedural time. Time spent was non routine post-operarive caRE and included multiple and repeated evaluations at the bedside  CTICU ATTENDING     					    Avila Morales MD

## 2020-10-27 NOTE — DIETITIAN INITIAL EVALUATION ADULT. - OTHER INFO
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 Pt is now s/p Left upper lobectomy(10/26), s/p mediastinal lymph node dissection(10/26),s/p Bronchoscopy.  Visited pt in room, seated at bedside chair. Pt reports UBW:133lbs and no wt loss (Admit Wt: 134lbs/60.8 kg). Pt denies N/V/C/D, +BM (10/25) PTA. Pt currently on regular diet, reports morning breakfast of apple juice + fruit cocktail + few bites of English toast, ordered grilled cheese for lunch. NKFA. Pt reports having some difficultly swallowing due to prev cancer dx, however, [facility] and would like to continue normal consistency diet. Encouraged PO intake, discussed ONS and pt is receptive to Ensure Enlive(vanilla). Pt reports having 2 cans of Ensure day before admission. Pt reports eating healthy balanced meals at home PTA. Skin with generalized rash, L lateral surgical inx. Geovanny Score 13. Please see below for recs and f/u per protocol. 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 Pt is now s/p Left upper lobectomy, mediastinal lymph node dissection and bronchoscopy (10/26).   Visited pt in room, seated at bedside chair. Pt reports UBW:133lbs and no wt loss, consistent with present wt (Admit Wt: 134lbs/60.8 kg). Pt denies N/V/C/D, +BM (10/25) PTA, no BM post-op. Pt currently on regular diet, reports morning breakfast of apple juice + fruit cocktail + few bites of Serbian toast, ordered grilled cheese for lunch. NKFA. Pt reports having some difficultly swallowing due to prev cancer dx, per H&P pt noted with episodes of aspiration/ coughing while eating however, does not want to try mech soft diet at this time and would like to continue normal consistency diet. Encouraged PO intake, discussed ONS and pt is receptive to Ensure Enlive (vanilla). Pt reports having 2 cans of Ensure day before admission. Pt reports eating healthy balanced meals at home PTA. Skin with generalized rash, L lateral surgical inx. Geovanny Score 13, no pain noted, on lidocaine patch. Will communicate recs with team. Please see below for recs and f/u per protocol.

## 2020-10-27 NOTE — DIETITIAN INITIAL EVALUATION ADULT. - PERSON TAUGHT/METHOD
patient instructed/verbal instruction/Encouraged PO intake and supplementation with Ensure Enlive x1/day.

## 2020-10-28 LAB
ANION GAP SERPL CALC-SCNC: 9 MMOL/L — SIGNIFICANT CHANGE UP (ref 5–17)
BASOPHILS # BLD AUTO: 0 K/UL — SIGNIFICANT CHANGE UP (ref 0–0.2)
BASOPHILS NFR BLD AUTO: 0 % — SIGNIFICANT CHANGE UP (ref 0–2)
BUN SERPL-MCNC: 21 MG/DL — SIGNIFICANT CHANGE UP (ref 7–23)
CALCIUM SERPL-MCNC: 8.8 MG/DL — SIGNIFICANT CHANGE UP (ref 8.4–10.5)
CHLORIDE SERPL-SCNC: 98 MMOL/L — SIGNIFICANT CHANGE UP (ref 96–108)
CO2 SERPL-SCNC: 29 MMOL/L — SIGNIFICANT CHANGE UP (ref 22–31)
CREAT SERPL-MCNC: 0.91 MG/DL — SIGNIFICANT CHANGE UP (ref 0.5–1.3)
CULTURE RESULTS: SIGNIFICANT CHANGE UP
EOSINOPHIL # BLD AUTO: 0 K/UL — SIGNIFICANT CHANGE UP (ref 0–0.5)
EOSINOPHIL NFR BLD AUTO: 0 % — SIGNIFICANT CHANGE UP (ref 0–6)
GIANT PLATELETS BLD QL SMEAR: PRESENT — SIGNIFICANT CHANGE UP
GLUCOSE SERPL-MCNC: 99 MG/DL — SIGNIFICANT CHANGE UP (ref 70–99)
HCT VFR BLD CALC: 36.9 % — LOW (ref 39–50)
HGB BLD-MCNC: 12.3 G/DL — LOW (ref 13–17)
LYMPHOCYTES # BLD AUTO: 0.29 K/UL — LOW (ref 1–3.3)
LYMPHOCYTES # BLD AUTO: 3.5 % — LOW (ref 13–44)
MAGNESIUM SERPL-MCNC: 2.2 MG/DL — SIGNIFICANT CHANGE UP (ref 1.6–2.6)
MANUAL SMEAR VERIFICATION: SIGNIFICANT CHANGE UP
MCHC RBC-ENTMCNC: 31.3 PG — SIGNIFICANT CHANGE UP (ref 27–34)
MCHC RBC-ENTMCNC: 33.3 GM/DL — SIGNIFICANT CHANGE UP (ref 32–36)
MCV RBC AUTO: 93.9 FL — SIGNIFICANT CHANGE UP (ref 80–100)
MONOCYTES # BLD AUTO: 0.51 K/UL — SIGNIFICANT CHANGE UP (ref 0–0.9)
MONOCYTES NFR BLD AUTO: 6.1 % — SIGNIFICANT CHANGE UP (ref 2–14)
NEUTROPHILS # BLD AUTO: 7.49 K/UL — HIGH (ref 1.8–7.4)
NEUTROPHILS NFR BLD AUTO: 90.4 % — HIGH (ref 43–77)
OVALOCYTES BLD QL SMEAR: SLIGHT — SIGNIFICANT CHANGE UP
PLAT MORPH BLD: ABNORMAL
PLATELET # BLD AUTO: 250 K/UL — SIGNIFICANT CHANGE UP (ref 150–400)
POTASSIUM SERPL-MCNC: 3.9 MMOL/L — SIGNIFICANT CHANGE UP (ref 3.5–5.3)
POTASSIUM SERPL-SCNC: 3.9 MMOL/L — SIGNIFICANT CHANGE UP (ref 3.5–5.3)
RBC # BLD: 3.93 M/UL — LOW (ref 4.2–5.8)
RBC # FLD: 12 % — SIGNIFICANT CHANGE UP (ref 10.3–14.5)
RBC BLD AUTO: ABNORMAL
SODIUM SERPL-SCNC: 136 MMOL/L — SIGNIFICANT CHANGE UP (ref 135–145)
SPECIMEN SOURCE: SIGNIFICANT CHANGE UP
SPHEROCYTES BLD QL SMEAR: SLIGHT — SIGNIFICANT CHANGE UP
SURGICAL PATHOLOGY STUDY: SIGNIFICANT CHANGE UP
WBC # BLD: 8.28 K/UL — SIGNIFICANT CHANGE UP (ref 3.8–10.5)
WBC # FLD AUTO: 8.28 K/UL — SIGNIFICANT CHANGE UP (ref 3.8–10.5)

## 2020-10-28 PROCEDURE — 71045 X-RAY EXAM CHEST 1 VIEW: CPT | Mod: 26,77

## 2020-10-28 PROCEDURE — 71045 X-RAY EXAM CHEST 1 VIEW: CPT | Mod: 26

## 2020-10-28 PROCEDURE — 99223 1ST HOSP IP/OBS HIGH 75: CPT

## 2020-10-28 RX ORDER — ACETAMINOPHEN 500 MG
650 TABLET ORAL EVERY 6 HOURS
Refills: 0 | Status: DISCONTINUED | OUTPATIENT
Start: 2020-10-28 | End: 2020-10-29

## 2020-10-28 RX ORDER — AMIODARONE HYDROCHLORIDE 400 MG/1
150 TABLET ORAL ONCE
Refills: 0 | Status: COMPLETED | OUTPATIENT
Start: 2020-10-28 | End: 2020-10-28

## 2020-10-28 RX ORDER — AMIODARONE HYDROCHLORIDE 400 MG/1
200 TABLET ORAL DAILY
Refills: 0 | Status: DISCONTINUED | OUTPATIENT
Start: 2020-10-29 | End: 2020-10-29

## 2020-10-28 RX ORDER — AMIODARONE HYDROCHLORIDE 400 MG/1
400 TABLET ORAL EVERY 12 HOURS
Refills: 0 | Status: DISCONTINUED | OUTPATIENT
Start: 2020-10-28 | End: 2020-10-28

## 2020-10-28 RX ORDER — ALBUMIN HUMAN 25 %
250 VIAL (ML) INTRAVENOUS ONCE
Refills: 0 | Status: COMPLETED | OUTPATIENT
Start: 2020-10-28 | End: 2020-10-28

## 2020-10-28 RX ORDER — POTASSIUM CHLORIDE 20 MEQ
20 PACKET (EA) ORAL ONCE
Refills: 0 | Status: COMPLETED | OUTPATIENT
Start: 2020-10-28 | End: 2020-10-28

## 2020-10-28 RX ADMIN — POLYETHYLENE GLYCOL 3350 17 GRAM(S): 17 POWDER, FOR SOLUTION ORAL at 13:51

## 2020-10-28 RX ADMIN — Medication 50 MICROGRAM(S): at 06:27

## 2020-10-28 RX ADMIN — Medication 650 MILLIGRAM(S): at 21:43

## 2020-10-28 RX ADMIN — Medication 650 MILLIGRAM(S): at 15:03

## 2020-10-28 RX ADMIN — Medication 650 MILLIGRAM(S): at 22:40

## 2020-10-28 RX ADMIN — HEPARIN SODIUM 5000 UNIT(S): 5000 INJECTION INTRAVENOUS; SUBCUTANEOUS at 13:51

## 2020-10-28 RX ADMIN — SODIUM CHLORIDE 3 MILLILITER(S): 9 INJECTION INTRAMUSCULAR; INTRAVENOUS; SUBCUTANEOUS at 21:05

## 2020-10-28 RX ADMIN — AMIODARONE HYDROCHLORIDE 133.33 MILLIGRAM(S): 400 TABLET ORAL at 08:14

## 2020-10-28 RX ADMIN — Medication 20 MILLIEQUIVALENT(S): at 08:15

## 2020-10-28 RX ADMIN — HEPARIN SODIUM 5000 UNIT(S): 5000 INJECTION INTRAVENOUS; SUBCUTANEOUS at 21:43

## 2020-10-28 RX ADMIN — Medication 125 MILLILITER(S): at 08:14

## 2020-10-28 RX ADMIN — AMIODARONE HYDROCHLORIDE 133.33 MILLIGRAM(S): 400 TABLET ORAL at 11:35

## 2020-10-28 RX ADMIN — LIDOCAINE 1 PATCH: 4 CREAM TOPICAL at 13:49

## 2020-10-28 RX ADMIN — SODIUM CHLORIDE 3 MILLILITER(S): 9 INJECTION INTRAMUSCULAR; INTRAVENOUS; SUBCUTANEOUS at 13:28

## 2020-10-28 RX ADMIN — Medication 650 MILLIGRAM(S): at 16:03

## 2020-10-28 RX ADMIN — AMIODARONE HYDROCHLORIDE 400 MILLIGRAM(S): 400 TABLET ORAL at 15:03

## 2020-10-28 RX ADMIN — Medication 2 MILLIGRAM(S): at 21:43

## 2020-10-28 RX ADMIN — SODIUM CHLORIDE 3 MILLILITER(S): 9 INJECTION INTRAMUSCULAR; INTRAVENOUS; SUBCUTANEOUS at 06:27

## 2020-10-28 RX ADMIN — LIDOCAINE 1 PATCH: 4 CREAM TOPICAL at 18:23

## 2020-10-28 NOTE — PROGRESS NOTE ADULT - SUBJECTIVE AND OBJECTIVE BOX
Patient discussed on morning rounds with Dr. Mackey/ Dr. Lopez     Operation / Date: 10/26/20 Left VATS, RA, LULx 2/2 SCC    SUBJECTIVE ASSESSMENT:  80y Male assessed at bedside this AM. Patient feels well, no acute complaints beyond wanting to leave the hospital. No pain or trouble breathing. No BM since pre-op, is passing flatus and denies abdominal pain. Is using IS (500 cc). Is ambulating with his chest tube on suction.     Vital Signs Last 24 Hrs  T(C): 36.6 (28 Oct 2020 09:00), Max: 37.2 (28 Oct 2020 04:30)  T(F): 97.9 (28 Oct 2020 09:00), Max: 98.9 (28 Oct 2020 04:30)  HR: 87 (28 Oct 2020 11:30) (71 - 110)  BP: 95/59 (28 Oct 2020 11:30) (64/41 - 137/61)  BP(mean): 72 (28 Oct 2020 11:30) (48 - 90)  RR: 17 (28 Oct 2020 11:30) (16 - 20)  SpO2: 99% (28 Oct 2020 11:30) (95% - 100%)  I&O's Detail    27 Oct 2020 07:01  -  28 Oct 2020 07:00  --------------------------------------------------------  IN:    IV PiggyBack: 150 mL    Lactated Ringers: 150 mL    Oral Fluid: 420 mL  Total IN: 720 mL    OUT:    Chest Tube (mL): 180 mL    Indwelling Catheter - Urethral (mL): 1370 mL  Total OUT: 1550 mL    Total NET: -830 mL      28 Oct 2020 07:01  -  28 Oct 2020 13:08  --------------------------------------------------------  IN:    IV PiggyBack: 100 mL  Total IN: 100 mL    OUT:    Chest Tube (mL): 20 mL    Indwelling Catheter - Urethral (mL): 50 mL  Total OUT: 70 mL    Total NET: 30 mL    CHEST TUBE:  Yes. AIR LEAKS: No. Suction  PEGGY DRAIN:  No.  EPICARDIAL WIRES: No.  TIE DOWNS: Yes  STEWARD: Yes    Physical Exam  CONSTITUTIONAL: Well appearing in NAD assessed laying comfortably in bed   NEURO: A&OX3. No focal deficits noted, moving bilateral upper and lower extremities                    CV: RRR, no murmurs, rubs, gallops  RESPIRATORY: 1 CT on suction, no air leak. Clear to auscultation bilateral posterior lung fields, no wheezes, rales, rhonchi   GI: +BS, NT/ND  MUSKULOSKELETAL: No peripheral edema or calf tenderness. Full strength and ROM bilateral upper and lower extremities   VASCULAR: Bilateral distal pulses 2+  INCISIONS: L VATS incisions clean, dry, intact.     LABS:                        12.3   8.28  )-----------( 250      ( 28 Oct 2020 06:43 )             36.9       COUMADIN:  No    PT/INR - ( 27 Oct 2020 03:10 )   PT: 13.1 sec;   INR: 1.10          PTT - ( 27 Oct 2020 03:10 )  PTT:36.8 sec    10-28    136  |  98  |  21  ----------------------------<  99  3.9   |  29  |  0.91    Ca    8.8      28 Oct 2020 06:43  Phos  3.2     10-27  Mg     2.2     10-28    TPro  6.7  /  Alb  3.4  /  TBili  0.5  /  DBili  x   /  AST  21  /  ALT  9<L>  /  AlkPhos  63  10-27    MEDICATIONS  (STANDING):  bisacodyl Suppository 10 milliGRAM(s) Rectal once  doxazosin 2 milliGRAM(s) Oral at bedtime  heparin   Injectable 5000 Unit(s) SubCutaneous every 8 hours  levothyroxine 50 MICROGram(s) Oral daily  lidocaine   Patch 1 Patch Transdermal daily  pantoprazole    Tablet 40 milliGRAM(s) Oral before breakfast  polyethylene glycol 3350 17 Gram(s) Oral daily  sodium chloride 0.9% lock flush 3 milliLiter(s) IV Push every 8 hours    MEDICATIONS  (PRN):  glucagon  Injectable 1 milliGRAM(s) IntraMuscular once PRN Glucose LESS THAN 70 milligrams/deciliter      RADIOLOGY & ADDITIONAL TESTS:  < from: Xray Chest 1 View- PORTABLE-Routine (Xray Chest 1 View- PORTABLE-Routine in AM.) (10.28.20 @ 06:01) >  INTERPRETATION:  Clinical history/reason for exam: Postop.    2 frontal views.    COMPARISON: October 27, 2020.    Findings/  impression: Stable position left chest tube. Left basilar opacity, new. Decreased right costophrenic angle blunting. Heart size within normal limits. Left subcutaneous emphysema, stable. Elevation of the left hemidiaphragm. Stable bony structures.    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 Trouble swallowing since that time, eats a normal diet but sometimes aspirates and coughs . 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 He was referred by Dr. Pandya to Dr. Mackey for evaluation. After discussion at thoracic tumor board decision was made to proceed with left upper lobectomy. On 10/26 he underwent an uncomplicated left VATS, RA left upper lobectomy. Procedure was uncomplicated however post operatively in the PACU he was noted to be hypercarbic and was brought to the CTICU. His hypercarbia resolved. A tseward was placed for urinary retention. POD1 he was transferred to [facility] with one chest tube in place. Today is POD2, patient went into atrial fibrillation this morning. CT remains on suction, no air leak.     Neurovascular:   - No delirium. Pain well controlled with current regimen.  -Continue tylenol PRN    Cardiovascular:   -Hemodynamically stable. HR controlled (68-97)  - Atrial fibrillation this AM, rate controlled, given amiodarone bolus X2, cardiology consulted f/u recs  - Hx of HTN, BP controlled (88//67)    Respiratory:   - POD2 s/p L VATS, RA LULx 2/2 SCC  - 1 CT in place on suction, no air leak today   - 02 Sat = 96% on RA      -If on oxygen wean to RA from for O2 Sat > 93%.  -Encourage C+DB and Use of IS 10x / hr while awake.  -CXR.    GI:   - Stable.  -NPO after MN.  -Continue GI PPX with protonix  -PO Diet.    Renal / :  - BUN/Cr stable at X/X  -Continue to monitor renal function.  -Monitor I/O's.  - Replete electrolytes PRN    Endocrine:    -A1c.  -TSH.    Hematologic:  -H/H stable at X/X with no obvious signs of bleeding  -Coagulation Panel.    ID:  -Pt remains afebrile with no elevation in WBC or signs of infection  -Continue to monitor CBC  -Observe for SIRS/Sepsis Syndrome.    Prophylaxis:  -DVT prophylaxis with 5000 SubQ Heparin q8h.  -SCD's    Disposition:  -Home when medically appropriate.       Patient discussed on morning rounds with Dr. Mackey/ Dr. Lopez     Operation / Date: 10/26/20 Left VATS, RA, LULx 2/2 SCC    SUBJECTIVE ASSESSMENT:  80y Male assessed at bedside this AM. Patient feels well, no acute complaints beyond wanting to leave the hospital. No pain or trouble breathing. No BM since pre-op, is passing flatus and denies abdominal pain. Is using IS (500 cc). Is ambulating with his chest tube on suction.     Vital Signs Last 24 Hrs  T(C): 36.6 (28 Oct 2020 09:00), Max: 37.2 (28 Oct 2020 04:30)  T(F): 97.9 (28 Oct 2020 09:00), Max: 98.9 (28 Oct 2020 04:30)  HR: 87 (28 Oct 2020 11:30) (71 - 110)  BP: 95/59 (28 Oct 2020 11:30) (64/41 - 137/61)  BP(mean): 72 (28 Oct 2020 11:30) (48 - 90)  RR: 17 (28 Oct 2020 11:30) (16 - 20)  SpO2: 99% (28 Oct 2020 11:30) (95% - 100%)  I&O's Detail    27 Oct 2020 07:01  -  28 Oct 2020 07:00  --------------------------------------------------------  IN:    IV PiggyBack: 150 mL    Lactated Ringers: 150 mL    Oral Fluid: 420 mL  Total IN: 720 mL    OUT:    Chest Tube (mL): 180 mL    Indwelling Catheter - Urethral (mL): 1370 mL  Total OUT: 1550 mL    Total NET: -830 mL      28 Oct 2020 07:01  -  28 Oct 2020 13:08  --------------------------------------------------------  IN:    IV PiggyBack: 100 mL  Total IN: 100 mL    OUT:    Chest Tube (mL): 20 mL    Indwelling Catheter - Urethral (mL): 50 mL  Total OUT: 70 mL    Total NET: 30 mL    CHEST TUBE:  Yes. AIR LEAKS: No. Suction  PEGGY DRAIN:  No.  EPICARDIAL WIRES: No.  TIE DOWNS: Yes  STEWARD: Yes    Physical Exam  CONSTITUTIONAL: Well appearing in NAD assessed laying comfortably in bed   NEURO: A&OX3. No focal deficits noted, moving bilateral upper and lower extremities                    CV: RRR, no murmurs, rubs, gallops  RESPIRATORY: 1 CT on suction, no air leak. Clear to auscultation bilateral posterior lung fields, no wheezes, rales, rhonchi   GI: +BS, NT/ND  MUSKULOSKELETAL: No peripheral edema or calf tenderness. Full strength and ROM bilateral upper and lower extremities   VASCULAR: Bilateral distal pulses 2+  INCISIONS: L VATS incisions clean, dry, intact.     LABS:                        12.3   8.28  )-----------( 250      ( 28 Oct 2020 06:43 )             36.9       COUMADIN:  No    PT/INR - ( 27 Oct 2020 03:10 )   PT: 13.1 sec;   INR: 1.10          PTT - ( 27 Oct 2020 03:10 )  PTT:36.8 sec    10-28    136  |  98  |  21  ----------------------------<  99  3.9   |  29  |  0.91    Ca    8.8      28 Oct 2020 06:43  Phos  3.2     10-27  Mg     2.2     10-28    TPro  6.7  /  Alb  3.4  /  TBili  0.5  /  DBili  x   /  AST  21  /  ALT  9<L>  /  AlkPhos  63  10-27    MEDICATIONS  (STANDING):  bisacodyl Suppository 10 milliGRAM(s) Rectal once  doxazosin 2 milliGRAM(s) Oral at bedtime  heparin   Injectable 5000 Unit(s) SubCutaneous every 8 hours  levothyroxine 50 MICROGram(s) Oral daily  lidocaine   Patch 1 Patch Transdermal daily  pantoprazole    Tablet 40 milliGRAM(s) Oral before breakfast  polyethylene glycol 3350 17 Gram(s) Oral daily  sodium chloride 0.9% lock flush 3 milliLiter(s) IV Push every 8 hours    MEDICATIONS  (PRN):  glucagon  Injectable 1 milliGRAM(s) IntraMuscular once PRN Glucose LESS THAN 70 milligrams/deciliter      RADIOLOGY & ADDITIONAL TESTS:  < from: Xray Chest 1 View- PORTABLE-Routine (Xray Chest 1 View- PORTABLE-Routine in AM.) (10.28.20 @ 06:01) >  INTERPRETATION:  Clinical history/reason for exam: Postop.    2 frontal views.    COMPARISON: October 27, 2020.    Findings/  impression: Stable position left chest tube. Left basilar opacity, new. Decreased right costophrenic angle blunting. Heart size within normal limits. Left subcutaneous emphysema, stable. Elevation of the left hemidiaphragm. Stable bony structures.    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 Trouble swallowing since that time, eats a normal diet but sometimes aspirates and coughs . 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 He was referred by Dr. Pandya to Dr. Mackey for evaluation. After discussion at thoracic tumor board decision was made to proceed with left upper lobectomy. On 10/26 he underwent an uncomplicated left VATS, RA left upper lobectomy. Procedure was uncomplicated however post operatively in the PACU he was noted to be hypercarbic and was brought to the CTICU. His hypercarbia resolved. A steward was placed for urinary retention. POD1 he was transferred to [facility] with one chest tube in place. Today is POD2, patient went into atrial fibrillation this morning. CT remains on suction, no air leak.     Neurovascular:   - No delirium. Pain well controlled with current regimen.  -Continue tylenol PRN    Cardiovascular:   -Hemodynamically stable. HR controlled (68-97)  - Atrial fibrillation this AM, rate controlled, given amiodarone bolus X2, cardiology consulted f/u recs  - Hx of HTN, BP controlled (88//67)    Respiratory:   - POD2 s/p L VATS, RA LULx 2/2 SCC  - 1 CT in place on suction, no air leak today   - 02 Sat = 96% on RA  -If on oxygen wean to RA from for O2 Sat > 93%.  -Encourage C+DB and Use of IS 10x / hr while awake.  -F/U AM CXR    GI:   - Stable.  -Continue GI PPX with protonix  -PO Diet.    Renal / :  - BUN/Cr stable at 21/0.91  - Steward placed post op for urinary retention, will remove and TOV today  -Continue to monitor renal function.  -Monitor I/O's.  - Replete electrolytes PRN    Endocrine:    - No known hx thyroid disease or diabetes     Hematologic:  -H/H stable at 12.3/36.9 with no obvious signs of bleeding  -Coagulation Panel.    ID:  -Pt remains afebrile with no elevation in WBC or signs of infection  -Continue to monitor CBC  -Observe for SIRS/Sepsis Syndrome.    Prophylaxis:  -DVT prophylaxis with 5000 SubQ Heparin q8h.  -SCD's    Disposition:  -Home when medically appropriate pending CT management

## 2020-10-28 NOTE — CONSULT NOTE ADULT - ASSESSMENT
80M nonsmoker w/PMHx SCC of L supraglottic larynx s/p chemo/RT (2016) w/recurrence to R soft palate s/p surgical removal (1/2018) and RT (completed 2018), hypothyroidism, who p/w recurrent SCC in L  lingula. Underwent uncomplicated L VATS robotic-assisted LULectomy. Cardiology c/s for possible post-op afib.    - Review of tele limited as pt had converted to NSR at time of assessment (potentially due to amio boluses) and had transferred beds w/loss of prev tele recordings  - EKG and tele available show only NSR w/freq PACs  - Atrial arrhythmia such as afib and PAT common in post-op state, particularly cardiothoracic  - Recommend starting maintenance dose amio 200mg PO qd w/o load to maintain pt in NSR in tao-op period  - C/w home synthroid, check TFTs  - Noted low voltage in precordial leads on EKG, recommend TTE to evaluate for pericardial effusion.    Pt seen and d/w consult attending.    --  Riky Ricks MD  Cardiology PGY-6 80M nonsmoker w/PMHx SCC of L supraglottic larynx s/p chemo/RT (2016) w/recurrence to R soft palate s/p surgical removal (1/2018) and RT (completed 2018), hypothyroidism, who p/w recurrent SCC in L  lingula. Underwent uncomplicated L VATS robotic-assisted LULectomy. Cardiology c/s for possible post-op afib.    - Review of tele limited as pt had converted to NSR at time of assessment (potentially due to amio boluses) and had transferred beds w/loss of prev tele recordings  - EKG and tele available show only NSR w/freq PACs  - Atrial arrhythmia such as afib and PAT common in post-op state, particularly cardiothoracic  - Recommend starting maintenance dose amio 200mg PO qd w/o load to maintain pt in NSR in tao-op period  - C/w home synthroid, check TFTs. Monitor lytes, keep K>4, Mg>2  - Noted low voltage in precordial leads on EKG, recommend TTE to evaluate for pericardial effusion.    Pt seen and d/w consult attending.    --  Riky Ricks MD  Cardiology PGY-6

## 2020-10-28 NOTE — PROGRESS NOTE ADULT - NUTRITIONAL ASSESSMENT
This patient has been assessed with a concern for Malnutrition and has been determined to have a diagnosis/diagnoses of Underweight/BMI < 19.    This patient is being managed with:   Diet Regular-  Supplement Feeding Modality:  Oral  Ensure Enlive Cans or Servings Per Day:  1       Frequency:  Daily  Entered: Oct 27 2020  2:19PM    Diet Regular-  Entered: Oct 27 2020  4:44AM    The following pending diet order is being considered for treatment of Underweight/BMI < 19:null

## 2020-10-28 NOTE — CONSULT NOTE ADULT - SUBJECTIVE AND OBJECTIVE BOX
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 Trouble swallowing since that time, eats a normal diet but sometimes aspirates and coughs . 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 He was referred by Dr. Pandya. DIscussed at thoracic Tumor Board and decision was made to proceed with left upper lobectomy. He presents today to preop holding for bronchoscopy, LVATS, robotic assisted, left upper lobectomy and MLND. He has been NPO since yesterday. He took only levothyroxine this AM. Plan to proceed with surgery. He denies fever, chills, N/V/D, recent sick contacts, headache, syncope, chest pain, palpitations. (26 Oct 2020 10:45)    PAST MEDICAL & SURGICAL HISTORY:  Squamous cell carcinoma lung    HTN (hypertension)    Throat cancer  R/T and chemo    Surgery, elective  mouth sx    Elective surgery  Lymph Nodes Removal of the Neck    Elective surgery  Biopsy of Larynx    Carcinoma of laryngeal cartilages    S/P appendectomy    History of tonsillectomy      [ ] Diabetes   [ ] Hypertension  [ ] Hyperlipidemia  [ ] CAD  [ ] PCI  [ ] CABG    PREVIOUS DIAGNOSTIC TESTING:    [ ] Echocardiogram:  [ ] Stress Test:  [ ] Catheterization: 	    FAMILY HISTORY:    SOCIAL HISTORY:    [ ] Non-smoker  [ ] Current Smoker  [ ] Former Smoker  [ ] Alcohol Use  [ ] Drug Use    ALLERGIES/INTOLERANCES:  bactroban (Other; Rash)  hydrogen peroxide (Rash)  latex (Rash)    HOME MEDICATIONS:  · 	Hytrin 2 mg oral capsule: Last Dose Taken: 25-Oct-2020 8:00 PM, 1 cap(s) orally once a day (at bedtime)  · 	levothyroxine: Last Dose Taken: 26-Oct-2020 7:00 AM, 50 microgram(s) orally once a day      INPATIENT MEDICATIONS:  doxazosin 2 milliGRAM(s) Oral at bedtime    heparin   Injectable 5000 Unit(s) SubCutaneous every 8 hours    acetaminophen   Tablet .. 650 milliGRAM(s) Oral every 6 hours PRN  glucagon  Injectable 1 milliGRAM(s) IntraMuscular once PRN  levothyroxine 50 MICROGram(s) Oral daily  lidocaine   Patch 1 Patch Transdermal daily  pantoprazole    Tablet 40 milliGRAM(s) Oral before breakfast  polyethylene glycol 3350 17 Gram(s) Oral daily  sodium chloride 0.9% lock flush 3 milliLiter(s) IV Push every 8 hours      REVIEW OF SYSTEMS:  [x] All other review of systems are negative unless indicated above.  [ ] Unable to obtain due to:    PHYSICAL EXAM:    T(C): 36.4 (10-28-20 @ 21:31), Max: 37.2 (10-28-20 @ 04:30)  HR: 66 (10-28-20 @ 20:58) (64 - 110)  BP: 131/68 (10-28-20 @ 20:58) (64/41 - 131/68)  RR: 16 (10-28-20 @ 20:58) (16 - 18)  SpO2: 100% (10-28-20 @ 20:58) (95% - 100%)  Wt(kg): --    I&O's Summary    27 Oct 2020 07:01  -  28 Oct 2020 07:00  --------------------------------------------------------  IN: 720 mL / OUT: 1550 mL / NET: -830 mL    28 Oct 2020 07:01  -  28 Oct 2020 22:29  --------------------------------------------------------  IN: 620 mL / OUT: 550 mL / NET: 70 mL    GENERAL: NAD, well-developed  HEAD:  NCAT  HEENT: EOMI, PERRL, conjunctiva and sclera clear; moist mucosa; Neck supple, No JVD  CARDIOVASCULAR: RRR, normal S1 S2, no M/R/G, no JVD, neg HJR, nondisplaced PMI, no LE edema  RESPIRATORY: Lungs grossly clear to auscultation b/l, no C/W/R. Chest tube secured in place to suction  GASTROINTESTINAL: +BS, soft, non-distended, non-tender, no HSM  EXTREMITIES: Warm. No clubbing, cyanosis or edema.  NEURO: AAOx3, no focal deficits  LINES:    TELEMETRY: 	  NSR w/freq PACs. Pt transferred beds, prior tele w/possible afib lost    ECG:  	NSR w/low voltage in limb leads  	  LABS:                        12.3   8.28  )-----------( 250      ( 28 Oct 2020 06:43 )             36.9     10-28    136  |  98  |  21  ----------------------------<  99  3.9   |  29  |  0.91    Ca    8.8      28 Oct 2020 06:43  Phos  3.2     10-27  Mg     2.2     10-28    TPro  6.7  /  Alb  3.4  /  TBili  0.5  /  DBili  x   /  AST  21  /  ALT  9<L>  /  AlkPhos  63  10-27      Lipid Profile:   HgA1c:   TSH:     CARDIAC MARKERS:          proBNP:     RADIOLOGY:

## 2020-10-29 ENCOUNTER — TRANSCRIPTION ENCOUNTER (OUTPATIENT)
Age: 80
End: 2020-10-29

## 2020-10-29 VITALS — TEMPERATURE: 97 F

## 2020-10-29 PROBLEM — C34.90 MALIGNANT NEOPLASM OF UNSPECIFIED PART OF UNSPECIFIED BRONCHUS OR LUNG: Chronic | Status: ACTIVE | Noted: 2020-10-19

## 2020-10-29 LAB
ANION GAP SERPL CALC-SCNC: 12 MMOL/L — SIGNIFICANT CHANGE UP (ref 5–17)
BUN SERPL-MCNC: 20 MG/DL — SIGNIFICANT CHANGE UP (ref 7–23)
CALCIUM SERPL-MCNC: 9.3 MG/DL — SIGNIFICANT CHANGE UP (ref 8.4–10.5)
CHLORIDE SERPL-SCNC: 100 MMOL/L — SIGNIFICANT CHANGE UP (ref 96–108)
CO2 SERPL-SCNC: 26 MMOL/L — SIGNIFICANT CHANGE UP (ref 22–31)
CREAT SERPL-MCNC: 0.78 MG/DL — SIGNIFICANT CHANGE UP (ref 0.5–1.3)
GLUCOSE SERPL-MCNC: 94 MG/DL — SIGNIFICANT CHANGE UP (ref 70–99)
HCT VFR BLD CALC: 38.9 % — LOW (ref 39–50)
HGB BLD-MCNC: 12.9 G/DL — LOW (ref 13–17)
MAGNESIUM SERPL-MCNC: 2.1 MG/DL — SIGNIFICANT CHANGE UP (ref 1.6–2.6)
MCHC RBC-ENTMCNC: 31.9 PG — SIGNIFICANT CHANGE UP (ref 27–34)
MCHC RBC-ENTMCNC: 33.2 GM/DL — SIGNIFICANT CHANGE UP (ref 32–36)
MCV RBC AUTO: 96 FL — SIGNIFICANT CHANGE UP (ref 80–100)
NRBC # BLD: 0 /100 WBCS — SIGNIFICANT CHANGE UP (ref 0–0)
PLATELET # BLD AUTO: 224 K/UL — SIGNIFICANT CHANGE UP (ref 150–400)
POTASSIUM SERPL-MCNC: 4.4 MMOL/L — SIGNIFICANT CHANGE UP (ref 3.5–5.3)
POTASSIUM SERPL-SCNC: 4.4 MMOL/L — SIGNIFICANT CHANGE UP (ref 3.5–5.3)
RBC # BLD: 4.05 M/UL — LOW (ref 4.2–5.8)
RBC # FLD: 12.2 % — SIGNIFICANT CHANGE UP (ref 10.3–14.5)
SODIUM SERPL-SCNC: 138 MMOL/L — SIGNIFICANT CHANGE UP (ref 135–145)
WBC # BLD: 6.99 K/UL — SIGNIFICANT CHANGE UP (ref 3.8–10.5)
WBC # FLD AUTO: 6.99 K/UL — SIGNIFICANT CHANGE UP (ref 3.8–10.5)

## 2020-10-29 PROCEDURE — 93306 TTE W/DOPPLER COMPLETE: CPT

## 2020-10-29 PROCEDURE — C9399: CPT

## 2020-10-29 PROCEDURE — 85025 COMPLETE CBC W/AUTO DIFF WBC: CPT

## 2020-10-29 PROCEDURE — 85027 COMPLETE CBC AUTOMATED: CPT

## 2020-10-29 PROCEDURE — 71045 X-RAY EXAM CHEST 1 VIEW: CPT | Mod: 26

## 2020-10-29 PROCEDURE — 86900 BLOOD TYPING SEROLOGIC ABO: CPT

## 2020-10-29 PROCEDURE — 82962 GLUCOSE BLOOD TEST: CPT

## 2020-10-29 PROCEDURE — 88309 TISSUE EXAM BY PATHOLOGIST: CPT

## 2020-10-29 PROCEDURE — P9045: CPT

## 2020-10-29 PROCEDURE — C1889: CPT

## 2020-10-29 PROCEDURE — 86901 BLOOD TYPING SEROLOGIC RH(D): CPT

## 2020-10-29 PROCEDURE — 83735 ASSAY OF MAGNESIUM: CPT

## 2020-10-29 PROCEDURE — 88304 TISSUE EXAM BY PATHOLOGIST: CPT

## 2020-10-29 PROCEDURE — 86850 RBC ANTIBODY SCREEN: CPT

## 2020-10-29 PROCEDURE — 36415 COLL VENOUS BLD VENIPUNCTURE: CPT

## 2020-10-29 PROCEDURE — S2900: CPT

## 2020-10-29 PROCEDURE — 85610 PROTHROMBIN TIME: CPT

## 2020-10-29 PROCEDURE — 84100 ASSAY OF PHOSPHORUS: CPT

## 2020-10-29 PROCEDURE — 93306 TTE W/DOPPLER COMPLETE: CPT | Mod: 26

## 2020-10-29 PROCEDURE — 88305 TISSUE EXAM BY PATHOLOGIST: CPT

## 2020-10-29 PROCEDURE — 84295 ASSAY OF SERUM SODIUM: CPT

## 2020-10-29 PROCEDURE — 71045 X-RAY EXAM CHEST 1 VIEW: CPT

## 2020-10-29 PROCEDURE — 88331 PATH CONSLTJ SURG 1 BLK 1SPC: CPT

## 2020-10-29 PROCEDURE — 82330 ASSAY OF CALCIUM: CPT

## 2020-10-29 PROCEDURE — 99232 SBSQ HOSP IP/OBS MODERATE 35: CPT

## 2020-10-29 PROCEDURE — 80048 BASIC METABOLIC PNL TOTAL CA: CPT

## 2020-10-29 PROCEDURE — 94660 CPAP INITIATION&MGMT: CPT

## 2020-10-29 PROCEDURE — 85730 THROMBOPLASTIN TIME PARTIAL: CPT

## 2020-10-29 PROCEDURE — 84132 ASSAY OF SERUM POTASSIUM: CPT

## 2020-10-29 PROCEDURE — 82803 BLOOD GASES ANY COMBINATION: CPT

## 2020-10-29 PROCEDURE — 83605 ASSAY OF LACTIC ACID: CPT

## 2020-10-29 PROCEDURE — 80053 COMPREHEN METABOLIC PANEL: CPT

## 2020-10-29 RX ORDER — SENNA PLUS 8.6 MG/1
2 TABLET ORAL
Qty: 14 | Refills: 0
Start: 2020-10-29 | End: 2020-11-04

## 2020-10-29 RX ORDER — AMIODARONE HYDROCHLORIDE 400 MG/1
1 TABLET ORAL
Qty: 6 | Refills: 0
Start: 2020-10-29 | End: 2020-11-03

## 2020-10-29 RX ORDER — POLYETHYLENE GLYCOL 3350 17 G/17G
17 POWDER, FOR SOLUTION ORAL
Qty: 119 | Refills: 0
Start: 2020-10-29 | End: 2020-11-04

## 2020-10-29 RX ORDER — ACETAMINOPHEN 500 MG
2 TABLET ORAL
Qty: 112 | Refills: 0
Start: 2020-10-29 | End: 2020-11-11

## 2020-10-29 RX ADMIN — LIDOCAINE 1 PATCH: 4 CREAM TOPICAL at 13:25

## 2020-10-29 RX ADMIN — PANTOPRAZOLE SODIUM 40 MILLIGRAM(S): 20 TABLET, DELAYED RELEASE ORAL at 06:08

## 2020-10-29 RX ADMIN — AMIODARONE HYDROCHLORIDE 200 MILLIGRAM(S): 400 TABLET ORAL at 06:08

## 2020-10-29 RX ADMIN — HEPARIN SODIUM 5000 UNIT(S): 5000 INJECTION INTRAVENOUS; SUBCUTANEOUS at 06:08

## 2020-10-29 RX ADMIN — POLYETHYLENE GLYCOL 3350 17 GRAM(S): 17 POWDER, FOR SOLUTION ORAL at 13:25

## 2020-10-29 RX ADMIN — LIDOCAINE 1 PATCH: 4 CREAM TOPICAL at 01:31

## 2020-10-29 RX ADMIN — SODIUM CHLORIDE 3 MILLILITER(S): 9 INJECTION INTRAMUSCULAR; INTRAVENOUS; SUBCUTANEOUS at 06:09

## 2020-10-29 RX ADMIN — Medication 50 MICROGRAM(S): at 06:08

## 2020-10-29 NOTE — DISCHARGE NOTE PROVIDER - NSDCACTIVITY_GEN_ALL_CORE
Walking - Outdoors allowed/Showering allowed/Walking - Indoors allowed/No heavy lifting/straining/Do not make important decisions/Stairs allowed

## 2020-10-29 NOTE — DISCHARGE NOTE NURSING/CASE MANAGEMENT/SOCIAL WORK - PATIENT PORTAL LINK FT
You can access the FollowMyHealth Patient Portal offered by [facility] by registering at the following website: [email or web address]. By joining [employer]’s FollowMyHealth portal, you will also be able to view your health information using other applications (apps) compatible with our system.

## 2020-10-29 NOTE — DISCHARGE NOTE PROVIDER - NSDCFUSCHEDAPPT_GEN_ALL_CORE_FT
UMA FARLEY ; 11/05/2020 ; NPP [place] [address] [place] [address]  UMA FARLEY ; 11/24/2020 ; NPP [facility] [place] [address]  UMA FARLEY ; 12/08/2020 ; NPP [facility] [address]

## 2020-10-29 NOTE — DISCHARGE NOTE PROVIDER - PROVIDER TOKENS
PROVIDER:[TOKEN:[[id number]]],PROVIDER:[TOKEN:[[id number]]],PROVIDER:[TOKEN:[[id number]]]

## 2020-10-29 NOTE — DISCHARGE NOTE PROVIDER - NSDCCPTREATMENT_GEN_ALL_CORE_FT
PRINCIPAL PROCEDURE  Procedure: Bronchoscopy, with robot-assisted lobectomy  Findings and Treatment: LVAT, left upper lobectomy with MLND, and Thymic flap

## 2020-10-29 NOTE — DISCHARGE NOTE PROVIDER - NSDCCPCAREPLAN_GEN_ALL_CORE_FT
PRINCIPAL DISCHARGE DIAGNOSIS  Diagnosis: Nodule of left lung  Assessment and Plan of Treatment:

## 2020-10-29 NOTE — PROGRESS NOTE ADULT - ASSESSMENT
80M nonsmoker w/PMHx SCC of L supraglottic larynx s/p chemo/RT (2016) w/recurrence to R soft palate s/p surgical removal (1/2018) and RT (completed 2018), hypothyroidism, who p/w recurrent SCC in L  lingula. Underwent uncomplicated L VATS robotic-assisted LULectomy. Cardiology c/s for possible post-op afib.    - Review of tele shows pt maintained NSR o/n w/occ PACs. Available prior tele/EKG show only NSR w/freq PACs (prior tele strips lost when pt switched rooms)  - Atrial arrhythmia such as afib and PAT common in post-op state, particularly cardiothoracic  - C/w maintenance dose amio 200mg PO qd w/o load to maintain pt in NSR in tao-op period (1 week)  - C/w home synthroid, check TFTs. Monitor lytes, keep K>4, Mg>2  - No need for A/C  - Noted low voltage in precordial leads on EKG, plan for TTE today to evaluate for pericardial effusion.    Pt still to be seen and staffed w/consult attending. Note not final until addended by attending.    --  Riky Ricks MD  Cardiology PGY-6   80M nonsmoker w/PMHx SCC of L supraglottic larynx s/p chemo/RT (2016) w/recurrence to R soft palate s/p surgical removal (1/2018) and RT (completed 2018), hypothyroidism, who p/w recurrent SCC in L  lingula. Underwent uncomplicated L VATS robotic-assisted LULectomy. Cardiology c/s for possible post-op afib.    - Review of tele shows pt maintained NSR o/n w/occ PACs. Available prior tele/EKG show only NSR w/freq PACs (prior tele strips lost when pt switched rooms)  - Atrial arrhythmia such as afib and PAT common in post-op state, particularly cardiothoracic  - C/w maintenance dose amio 200mg PO qd w/o load to maintain pt in NSR in tao-op period (1 week)  - C/w home synthroid, check TFTs. Monitor lytes, keep K>4, Mg>2  - No need for A/C  - Noted low voltage in precordial leads on EKG, plan for TTE today to evaluate for pericardial effusion.  - Pt reports he has seen cards Dr. Parul Richardson in past. Pt should f/u with her within ~1 week of d/c    Pt still to be seen and staffed w/consult attending. Note not final until addended by attending.    --  Riky Ricks MD  Cardiology PGY-6

## 2020-10-29 NOTE — DISCHARGE NOTE PROVIDER - NSDCMRMEDTOKEN_GEN_ALL_CORE_FT
acetaminophen 325 mg oral tablet: 2 tab(s) orally every 6 hours, As needed, Moderate Pain (4 - 6)  amiodarone 200 mg oral tablet: 1 tab(s) orally once a day    Please take for 6 days after   Hytrin 2 mg oral capsule: 1 cap(s) orally once a day (at bedtime)  levothyroxine: 50 microgram(s) orally once a day  polyethylene glycol 3350 oral powder for reconstitution: 17 gram(s) orally once a day, As Needed -for constipation     Hold for loose stool  senna 17 mg oral tablet: 2 tab(s) orally once a day, As Needed -for constipation

## 2020-10-29 NOTE — DISCHARGE NOTE PROVIDER - CARE PROVIDERS DIRECT ADDRESSES
[email or web address]

## 2020-10-29 NOTE — CHART NOTE - NSCHARTNOTEFT_GEN_A_CORE
CT Removal:    Pt seen and examined at bedside.  Case discussed with Dr. Mackey.  Minimal output from CTs.  No air leak appreciated.  CT removed without incident per Dr. Mackey request.  Occlusive DSD placed.  CXR no obvious PTX noted.  Pt tolerated procedure well.

## 2020-10-29 NOTE — PROGRESS NOTE ADULT - SUBJECTIVE AND OBJECTIVE BOX
INTERVAL EVENTS:    Pt seen/examined at bedside, no SOUTH o/n. Maintained NSR o/n    MEDICATIONS  (STANDING):  aMIOdarone    Tablet 200 milliGRAM(s) Oral daily  doxazosin 2 milliGRAM(s) Oral at bedtime  heparin   Injectable 5000 Unit(s) SubCutaneous every 8 hours  levothyroxine 50 MICROGram(s) Oral daily  lidocaine   Patch 1 Patch Transdermal daily  pantoprazole    Tablet 40 milliGRAM(s) Oral before breakfast  polyethylene glycol 3350 17 Gram(s) Oral daily  sodium chloride 0.9% lock flush 3 milliLiter(s) IV Push every 8 hours    MEDICATIONS  (PRN):  acetaminophen   Tablet .. 650 milliGRAM(s) Oral every 6 hours PRN Moderate Pain (4 - 6)  glucagon  Injectable 1 milliGRAM(s) IntraMuscular once PRN Glucose LESS THAN 70 milligrams/deciliter      Vital Signs Last 24 Hrs  T(C): 36.6 (29 Oct 2020 05:09), Max: 36.9 (28 Oct 2020 14:00)  T(F): 97.9 (29 Oct 2020 05:09), Max: 98.4 (28 Oct 2020 14:00)  HR: 68 (29 Oct 2020 09:12) (64 - 91)  BP: 121/60 (29 Oct 2020 09:12) (95/59 - 139/79)  BP(mean): 86 (29 Oct 2020 09:12) (72 - 102)  RR: 16 (29 Oct 2020 09:12) (16 - 18)  SpO2: 99% (29 Oct 2020 09:12) (96% - 100%)     PHYSICAL EXAM:  GENERAL: NAD, well-developed  HEAD:  NCAT  HEENT: EOMI, PERRL, conjunctiva and sclera clear; moist mucosa; Neck supple, No JVD  CARDIOVASCULAR: RRR, normal S1 S2, no M/R/G, no JVD, neg HJR  RESPIRATORY: Lungs grossly clear to auscultation b/l, no C/W/R. Chest tube secured in place  GASTROINTESTINAL: +BS, soft, non-distended, non-tender, no HSM  EXTREMITIES: Warm. No clubbing, cyanosis. Trace pitting edema of LE  NEURO: AAOx3, no focal deficits      LABS:                        12.9   6.99  )-----------( 224      ( 29 Oct 2020 07:24 )             38.9     10-29    138  |  100  |  20  ----------------------------<  94  4.4   |  26  |  0.78    Ca    9.3      29 Oct 2020 07:23  Mg     2.1     10-29              I&O's Summary    28 Oct 2020 07:01  -  29 Oct 2020 07:00  --------------------------------------------------------  IN: 620 mL / OUT: 1445 mL / NET: -825 mL      BNP  RADIOLOGY & ADDITIONAL STUDIES:    TELEMETRY: NSR    EKG:

## 2020-10-29 NOTE — DISCHARGE NOTE NURSING/CASE MANAGEMENT/SOCIAL WORK - NSDCFUADDAPPT_GEN_ALL_CORE_FT
-Please follow up with Dr. Lopez on 11/5/20 at 1:30 PM.  The office is located at [facility], [facility], 4th floor. Call us with any questions #[phone].    -Please also follow up with your referring doctor (Dr. Pandya). The office will reach out with a follow up appointment.    - Please also follow up with Dr. Richardson (cardiologist) within 2 weeks of discharge given your episode of atrial fibrillation post operatively.

## 2020-10-29 NOTE — PROGRESS NOTE ADULT - SUBJECTIVE AND OBJECTIVE BOX
Patient discussed on morning rounds with Dr. Mackey    Operation / Date: 10/26/20 Left VATS, RA, LULx 2/2 SCC    Surgeon: Dr. Lopez/ Dr. Mackey    Referring Physician: Dr. Pandya    SUBJECTIVE ASSESSMENT:  80y Male assessed at bedside this AM. He feels well today, no acute complaints. No pain or SOB. No nausea, vomiting, diarrhea. No BM post op yet but endorses flatus. Is using IS (1000 cc).     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 Trouble swallowing since that time, eats a normal diet but sometimes aspirates and coughs . 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 He was referred by Dr. Pandya to Dr. Mackey for evaluation. After discussion at thoracic tumor board decision was made to proceed with left upper lobectomy. On 10/26 he underwent an uncomplicated left VATS, RA left upper lobectomy. Procedure was uncomplicated however post operatively in the PACU he was noted to be hypercarbic and was brought to the CTICU. His hypercarbia resolved. A mcwilliams was placed for urinary retention. POD1 he was transferred to [facility] with one chest tube in place. POD2, patient went into atrial fibrillation in the morning and was given 2X amiodarone boluses and started on PO amiodarone. He converted to NSR later that afternoon. Mcwilliams was removed and patent passed TOV. Chest tube was placed to water seal overnight and on POD3 CT was removed without incident. As per Dr. Mackey the patient was stable and ready for discharge on 10/29/20 on POD3. At time of discharge patient was in NSR, tolerating PO diet, ambulating independently on room air, and passing flatus. He states he has refills of all his home meds and does not need refills.    Over 30 minutes was spent with the patient reviewing the discharge material including medications, follow up appointments, recovery, concerning symptoms, and how to contact their health care providers if they have questions    Vital Signs Last 24 Hrs  T(C): 36.1 (29 Oct 2020 09:44), Max: 36.7 (28 Oct 2020 17:22)  T(F): 96.9 (29 Oct 2020 09:44), Max: 98.1 (28 Oct 2020 17:22)  HR: 66 (29 Oct 2020 13:32) (64 - 70)  BP: 153/71 (29 Oct 2020 13:32) (109/65 - 153/71)  BP(mean): 102 (29 Oct 2020 13:32) (77 - 102)  RR: 16 (29 Oct 2020 09:12) (16 - 18)  SpO2: 99% (29 Oct 2020 13:32) (96% - 100%)    EPICARDIAL WIRES REMOVED: N/A  TIE DOWNS REMOVED: No    Physical Exam  CONSTITUTIONAL: Well appearing in NAD assessed laying comfortably in bed   NEURO: A&OX3. No focal deficits noted, moving bilateral upper and lower extremities                    CV: RRR, no murmurs, rubs, gallops  RESPIRATORY: 1 CT on suction, no air leak. Clear to auscultation bilateral posterior lung fields, no wheezes, rales, rhonchi   GI: +BS, NT/ND  MUSKULOSKELETAL: No peripheral edema or calf tenderness. Full strength and ROM bilateral upper and lower extremities   VASCULAR: Bilateral distal pulses 2+  INCISIONS: L VATS incisions clean, dry, intact.     LABS:                        12.9   6.99  )-----------( 224      ( 29 Oct 2020 07:24 )             38.9       COUMADIN:  No.    10-29    138  |  100  |  20  ----------------------------<  94  4.4   |  26  |  0.78    Ca    9.3      29 Oct 2020 07:23  Mg     2.1     10-29    Discharge CXR:  < from: Xray Chest 1 View- PORTABLE-Routine (Xray Chest 1 View- PORTABLE-Routine in AM.) (10.28.20 @ 06:01) >  INTERPRETATION:  Clinical history/reason for exam: Postop.    2 frontal views.    COMPARISON: October 27, 2020.    Findings/  impression: Stable position left chest tube. Left basilar opacity, new. Decreased right costophrenic angle blunting. Heart size within normal limits. Left subcutaneous emphysema, stable. Elevation of the left hemidiaphragm. Stable bony structures.    < end of copied text >      Discharge ECHO:  e< from: TTE Echo Complete w/o Contrast w/ Doppler (10.29.20 @ 12:17) >  --------------------------------------------------------------------------------  CONCLUSIONS:     1. Normal left and right ventricular size and systolic function.   2. No significant valvular disease.   3. No evidence of pulmonary hypertension, pulmonary artery systolic pressure is 29 mmHg.   4. Small pericardial effusion predominantly adjacent to the right atrium without echocardiographic evidence of cardiac tamponade.   5. No prior echo is available for comparison.    < end of copied text >    Med Reconciliation:  Medication Reconciliation Status	Admission Reconciliation is Completed  Discharge Reconciliation is Completed  Discharge Medications	acetaminophen 325 mg oral tablet: 2 tab(s) orally every 6 hours, As needed, Moderate Pain (4 - 6)  amiodarone 200 mg oral tablet: 1 tab(s) orally once a day    Please take for 6 days after   Hytrin 2 mg oral capsule: 1 cap(s) orally once a day (at bedtime)  levothyroxine: 50 microgram(s) orally once a day  polyethylene glycol 3350 oral powder for reconstitution: 17 gram(s) orally once a day, As Needed -for constipation     Hold for loose stool  senna 17 mg oral tablet: 2 tab(s) orally once a day, As Needed -for constipation  ,  ,     Care Plan/Procedures:  Goal(s)	To get better and follow your care plan as instructed.  Discharge Diagnoses, Assessment and Plan of Treatment	PRINCIPAL DISCHARGE DIAGNOSIS  Diagnosis: Nodule of left lung  Assessment and Plan of Treatment:  Discharge Procedures, Findings and Treatment	PRINCIPAL PROCEDURE  Procedure: Bronchoscopy, with robot-assisted lobectomy  Findings and Treatment: LVAT, left upper lobectomy with MLND, and Thymic flap     Follow Up:  Care Providers for Follow up (PCP/Outpatient Provider)	Nayeli Pandya  RADIATION ONCOLOGY  [address] [address]  Phone: [phone]  Fax: [phone]  Follow Up Time:     Donnell Lopez)  Thoracic Surgery  [address], [address]th [place] [address]  Phone: [phone]  Fax: [phone]  Follow Up Time:     Parul Richardson)  Cardiology  [facility] [address]  [address], [address]  [place] [address]  Phone: [phone]  Fax: [phone]  Follow Up Time:  Patient's Scheduled Appointments	UMA FARLEY ; 11/05/2020 ; [facility] [address]  MARTINEUMA ; 11/24/2020 ; [facility] Cardio [facility] [address]  UMA FARLEY ; 12/08/2020 ; [facility] Otolaryng [address]  Additional Scheduled Appointments	-Please follow up with Dr. Lopez on 11/5/20 at 1:30 PM.  The office is located at [facility], 4th [facility]. Call us with any questions #[phone].    -Please also follow up with your referring doctor (Dr. Pandya). The office will reach out with a follow up appointment.    - Please also follow up with Dr. Richardson (cardiologist) within 2 weeks of discharge given your episode of atrial fibrillation post operatively.  Activity	Do not make important decisions, No heavy lifting/straining, Showering allowed, Stairs allowed, Walking - Indoors allowed, Walking - Outdoors allowed  Additional Instructions	- You have one suture in place where your chest tube was. Please keep this site covered until your outpatient appointment with Dr. Lopez.    -Walk daily as tolerated and use your incentive spirometer every hour.    -No driving or strenuous activity/exercise for 6 weeks, or until cleared by your surgeon.    -Gently clean your incisions with anti-bacterial soap and water, pat dry.  You may leave them open to air.    -Call your doctor if you have shortness of breath, chest pain not relieved by pain medication, dizziness, fever >101.5, or increased redness or drainage from incisions.

## 2020-10-29 NOTE — DISCHARGE NOTE PROVIDER - NSDCFUADDINST_GEN_ALL_CORE_FT
- You have one suture in place where your chest tube was. Please keep this site covered until your outpatient appointment with Dr. Lopez.    -Walk daily as tolerated and use your incentive spirometer every hour.    -No driving or strenuous activity/exercise for 6 weeks, or until cleared by your surgeon.    -Gently clean your incisions with anti-bacterial soap and water, pat dry.  You may leave them open to air.    -Call your doctor if you have shortness of breath, chest pain not relieved by pain medication, dizziness, fever >101.5, or increased redness or drainage from incisions.

## 2020-10-29 NOTE — DISCHARGE NOTE PROVIDER - NSDCFUADDAPPT_GEN_ALL_CORE_FT
-Please follow up with Dr. Lopez on 11/5/20 at 1:30 PM.  The office is located at [facility], [facility], 4th floor. Call us with any questions #[phone].    -Please also follow up with your referring doctor (Dr. Pandya). The office will reach out with a follow up appointment.    - Please also follow up with Dr. Richardson (cardiologist) within 2 weeks of discharge given your episode of atrial fibrillation post operatively.

## 2020-10-29 NOTE — DISCHARGE NOTE PROVIDER - CARE PROVIDER_API CALL
Nayeli Pandya  RADIATION ONCOLOGY  [address] [address]  Phone: [phone]  Fax: [phone]  Follow Up Time:     Donnell Lopez)  Thoracic Surgery  [address], [address]th [place] [address]  Phone: [phone]  Fax: [phone]  Follow Up Time:     Parul Richardson)  Cardiology  [facility] [address]  [address], [address]  [place] [address]  Phone: [phone]  Fax: [phone]  Follow Up Time:

## 2020-10-29 NOTE — DISCHARGE NOTE PROVIDER - HOSPITAL COURSE
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 Trouble swallowing since that time, eats a normal diet but sometimes aspirates and coughs . 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 He was referred by Dr. Pandya to Dr. Mackey for evaluation. After discussion at thoracic tumor board decision was made to proceed with left upper lobectomy. On 10/26 he underwent an uncomplicated left VATS, RA left upper lobectomy. Procedure was uncomplicated however post operatively in the PACU he was noted to be hypercarbic and was brought to the CTICU. His hypercarbia resolved. A mcwilliams was placed for urinary retention. POD1 he was transferred to [facility] with one chest tube in place. POD2, patient went into atrial fibrillation in the morning and was given 2X amiodarone boluses and started on PO amiodarone. He converted to NSR later that afternoon. Chest tube was placed to water seal overnight and on POD3 CT was removed without incident. As per Dr. Mackey the patient was stable and ready for discharge on 10/29/20 on POD3. At time of discharge patient was in NSR, tolerating PO diet, ambulating independently on room air, and passing flatus.     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 Trouble swallowing since that time, eats a normal diet but sometimes aspirates and coughs . 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 He was referred by Dr. Pandya to Dr. Mackey for evaluation. After discussion at thoracic tumor board decision was made to proceed with left upper lobectomy. On 10/26 he underwent an uncomplicated left VATS, RA left upper lobectomy. Procedure was uncomplicated however post operatively in the PACU he was noted to be hypercarbic and was brought to the CTICU. His hypercarbia resolved. A mcwilliams was placed for urinary retention. POD1 he was transferred to [facility] with one chest tube in place. POD2, patient went into atrial fibrillation in the morning and was given 2X amiodarone boluses and started on PO amiodarone. He converted to NSR later that afternoon. Chest tube was placed to water seal overnight and on POD3 CT was removed without incident. As per Dr. Mackey the patient was stable and ready for discharge on 10/29/20 on POD3. At time of discharge patient was in NSR, tolerating PO diet, ambulating independently on room air, and passing flatus. He states he has refills of all his home meds and does not need refills.    Over 30 minutes was spent with the patient reviewing the discharge material including medications, follow up appointments, recovery, concerning symptoms, and how to contact their health care providers if they have questions

## 2020-11-05 ENCOUNTER — APPOINTMENT (OUTPATIENT)
Dept: THORACIC SURGERY | Facility: CLINIC | Age: 80
End: 2020-11-05
Payer: MEDICARE

## 2020-11-05 ENCOUNTER — OUTPATIENT (OUTPATIENT)
Dept: OUTPATIENT SERVICES | Facility: HOSPITAL | Age: 80
LOS: 1 days | End: 2020-11-05
Payer: MEDICARE

## 2020-11-05 VITALS
HEIGHT: 72 IN | DIASTOLIC BLOOD PRESSURE: 70 MMHG | BODY MASS INDEX: 18.56 KG/M2 | HEART RATE: 65 BPM | TEMPERATURE: 97.1 F | WEIGHT: 137 LBS | OXYGEN SATURATION: 99 % | SYSTOLIC BLOOD PRESSURE: 145 MMHG | RESPIRATION RATE: 17 BRPM

## 2020-11-05 DIAGNOSIS — Z90.49 ACQUIRED ABSENCE OF OTHER SPECIFIED PARTS OF DIGESTIVE TRACT: Chronic | ICD-10-CM

## 2020-11-05 DIAGNOSIS — Z90.89 ACQUIRED ABSENCE OF OTHER ORGANS: Chronic | ICD-10-CM

## 2020-11-05 DIAGNOSIS — C32.3 MALIGNANT NEOPLASM OF LARYNGEAL CARTILAGE: Chronic | ICD-10-CM

## 2020-11-05 DIAGNOSIS — Z41.9 ENCOUNTER FOR PROCEDURE FOR PURPOSES OTHER THAN REMEDYING HEALTH STATE, UNSPECIFIED: Chronic | ICD-10-CM

## 2020-11-05 PROCEDURE — 71046 X-RAY EXAM CHEST 2 VIEWS: CPT | Mod: 26

## 2020-11-05 PROCEDURE — 99024 POSTOP FOLLOW-UP VISIT: CPT

## 2020-11-05 PROCEDURE — 71046 X-RAY EXAM CHEST 2 VIEWS: CPT

## 2020-11-19 ENCOUNTER — APPOINTMENT (OUTPATIENT)
Dept: HEART AND VASCULAR | Facility: CLINIC | Age: 80
End: 2020-11-19
Payer: MEDICARE

## 2020-11-19 ENCOUNTER — APPOINTMENT (OUTPATIENT)
Dept: THORACIC SURGERY | Facility: CLINIC | Age: 80
End: 2020-11-19
Payer: MEDICARE

## 2020-11-19 ENCOUNTER — OUTPATIENT (OUTPATIENT)
Dept: OUTPATIENT SERVICES | Facility: HOSPITAL | Age: 80
LOS: 1 days | End: 2020-11-19
Payer: MEDICARE

## 2020-11-19 VITALS
SYSTOLIC BLOOD PRESSURE: 130 MMHG | TEMPERATURE: 97.9 F | HEIGHT: 72 IN | BODY MASS INDEX: 18.42 KG/M2 | WEIGHT: 136 LBS | DIASTOLIC BLOOD PRESSURE: 78 MMHG

## 2020-11-19 VITALS
SYSTOLIC BLOOD PRESSURE: 158 MMHG | TEMPERATURE: 96.7 F | BODY MASS INDEX: 18.42 KG/M2 | HEART RATE: 71 BPM | OXYGEN SATURATION: 98 % | RESPIRATION RATE: 17 BRPM | HEIGHT: 72 IN | WEIGHT: 136 LBS | DIASTOLIC BLOOD PRESSURE: 72 MMHG

## 2020-11-19 DIAGNOSIS — Z90.89 ACQUIRED ABSENCE OF OTHER ORGANS: Chronic | ICD-10-CM

## 2020-11-19 DIAGNOSIS — Z90.49 ACQUIRED ABSENCE OF OTHER SPECIFIED PARTS OF DIGESTIVE TRACT: Chronic | ICD-10-CM

## 2020-11-19 DIAGNOSIS — Z41.9 ENCOUNTER FOR PROCEDURE FOR PURPOSES OTHER THAN REMEDYING HEALTH STATE, UNSPECIFIED: Chronic | ICD-10-CM

## 2020-11-19 DIAGNOSIS — C32.3 MALIGNANT NEOPLASM OF LARYNGEAL CARTILAGE: Chronic | ICD-10-CM

## 2020-11-19 PROCEDURE — 71046 X-RAY EXAM CHEST 2 VIEWS: CPT | Mod: 26

## 2020-11-19 PROCEDURE — 99024 POSTOP FOLLOW-UP VISIT: CPT

## 2020-11-19 PROCEDURE — 99214 OFFICE O/P EST MOD 30 MIN: CPT | Mod: 25

## 2020-11-19 PROCEDURE — 71046 X-RAY EXAM CHEST 2 VIEWS: CPT

## 2020-11-19 PROCEDURE — 93000 ELECTROCARDIOGRAM COMPLETE: CPT

## 2020-12-02 ENCOUNTER — APPOINTMENT (OUTPATIENT)
Dept: OTOLARYNGOLOGY | Facility: CLINIC | Age: 80
End: 2020-12-02
Payer: MEDICARE

## 2020-12-02 PROCEDURE — 31575 DIAGNOSTIC LARYNGOSCOPY: CPT

## 2020-12-02 PROCEDURE — 99214 OFFICE O/P EST MOD 30 MIN: CPT | Mod: 25

## 2021-02-11 ENCOUNTER — RESULT REVIEW (OUTPATIENT)
Age: 81
End: 2021-02-11

## 2021-02-11 ENCOUNTER — APPOINTMENT (OUTPATIENT)
Dept: CT IMAGING | Facility: HOSPITAL | Age: 81
End: 2021-02-11

## 2021-02-11 ENCOUNTER — OUTPATIENT (OUTPATIENT)
Dept: OUTPATIENT SERVICES | Facility: HOSPITAL | Age: 81
LOS: 1 days | End: 2021-02-11
Payer: MEDICARE

## 2021-02-11 DIAGNOSIS — C32.3 MALIGNANT NEOPLASM OF LARYNGEAL CARTILAGE: Chronic | ICD-10-CM

## 2021-02-11 DIAGNOSIS — Z90.49 ACQUIRED ABSENCE OF OTHER SPECIFIED PARTS OF DIGESTIVE TRACT: Chronic | ICD-10-CM

## 2021-02-11 DIAGNOSIS — Z41.9 ENCOUNTER FOR PROCEDURE FOR PURPOSES OTHER THAN REMEDYING HEALTH STATE, UNSPECIFIED: Chronic | ICD-10-CM

## 2021-02-11 DIAGNOSIS — Z90.89 ACQUIRED ABSENCE OF OTHER ORGANS: Chronic | ICD-10-CM

## 2021-02-11 PROCEDURE — 71260 CT THORAX DX C+: CPT

## 2021-02-11 PROCEDURE — 71260 CT THORAX DX C+: CPT | Mod: 26,MG

## 2021-02-11 PROCEDURE — G1004: CPT

## 2021-02-25 ENCOUNTER — APPOINTMENT (OUTPATIENT)
Dept: THORACIC SURGERY | Facility: CLINIC | Age: 81
End: 2021-02-25
Payer: MEDICARE

## 2021-02-25 ENCOUNTER — OUTPATIENT (OUTPATIENT)
Dept: OUTPATIENT SERVICES | Facility: HOSPITAL | Age: 81
LOS: 1 days | End: 2021-02-25
Payer: MEDICARE

## 2021-02-25 VITALS
TEMPERATURE: 97 F | SYSTOLIC BLOOD PRESSURE: 166 MMHG | RESPIRATION RATE: 17 BRPM | DIASTOLIC BLOOD PRESSURE: 74 MMHG | HEART RATE: 72 BPM | HEIGHT: 72 IN | BODY MASS INDEX: 18.01 KG/M2 | OXYGEN SATURATION: 97 % | WEIGHT: 133 LBS

## 2021-02-25 DIAGNOSIS — C32.3 MALIGNANT NEOPLASM OF LARYNGEAL CARTILAGE: Chronic | ICD-10-CM

## 2021-02-25 DIAGNOSIS — Z90.89 ACQUIRED ABSENCE OF OTHER ORGANS: Chronic | ICD-10-CM

## 2021-02-25 DIAGNOSIS — Z90.49 ACQUIRED ABSENCE OF OTHER SPECIFIED PARTS OF DIGESTIVE TRACT: Chronic | ICD-10-CM

## 2021-02-25 DIAGNOSIS — J90 PLEURAL EFFUSION, NOT ELSEWHERE CLASSIFIED: ICD-10-CM

## 2021-02-25 DIAGNOSIS — Z41.9 ENCOUNTER FOR PROCEDURE FOR PURPOSES OTHER THAN REMEDYING HEALTH STATE, UNSPECIFIED: Chronic | ICD-10-CM

## 2021-02-25 DIAGNOSIS — Z01.812 ENCOUNTER FOR PREPROCEDURAL LABORATORY EXAMINATION: ICD-10-CM

## 2021-02-25 LAB
ALBUMIN SERPL ELPH-MCNC: 4 G/DL — SIGNIFICANT CHANGE UP (ref 3.3–5)
ALP SERPL-CCNC: 86 U/L — SIGNIFICANT CHANGE UP (ref 40–120)
ALT FLD-CCNC: 9 U/L — LOW (ref 10–45)
ANION GAP SERPL CALC-SCNC: 9 MMOL/L — SIGNIFICANT CHANGE UP (ref 5–17)
APTT BLD: 36.2 SEC — HIGH (ref 27.5–35.5)
AST SERPL-CCNC: 16 U/L — SIGNIFICANT CHANGE UP (ref 10–40)
BASOPHILS # BLD AUTO: 0.04 K/UL — SIGNIFICANT CHANGE UP (ref 0–0.2)
BASOPHILS NFR BLD AUTO: 0.8 % — SIGNIFICANT CHANGE UP (ref 0–2)
BILIRUB SERPL-MCNC: 0.8 MG/DL — SIGNIFICANT CHANGE UP (ref 0.2–1.2)
BUN SERPL-MCNC: 12 MG/DL — SIGNIFICANT CHANGE UP (ref 7–23)
CALCIUM SERPL-MCNC: 9.2 MG/DL — SIGNIFICANT CHANGE UP (ref 8.4–10.5)
CHLORIDE SERPL-SCNC: 100 MMOL/L — SIGNIFICANT CHANGE UP (ref 96–108)
CO2 SERPL-SCNC: 30 MMOL/L — SIGNIFICANT CHANGE UP (ref 22–31)
CREAT SERPL-MCNC: 0.66 MG/DL — SIGNIFICANT CHANGE UP (ref 0.5–1.3)
EOSINOPHIL # BLD AUTO: 0.08 K/UL — SIGNIFICANT CHANGE UP (ref 0–0.5)
EOSINOPHIL NFR BLD AUTO: 1.6 % — SIGNIFICANT CHANGE UP (ref 0–6)
GLUCOSE SERPL-MCNC: 85 MG/DL — SIGNIFICANT CHANGE UP (ref 70–99)
HCT VFR BLD CALC: 37.7 % — LOW (ref 39–50)
HGB BLD-MCNC: 12 G/DL — LOW (ref 13–17)
IMM GRANULOCYTES NFR BLD AUTO: 0.2 % — SIGNIFICANT CHANGE UP (ref 0–1.5)
INR BLD: 1.03 — SIGNIFICANT CHANGE UP (ref 0.88–1.16)
LYMPHOCYTES # BLD AUTO: 0.66 K/UL — LOW (ref 1–3.3)
LYMPHOCYTES # BLD AUTO: 13.1 % — SIGNIFICANT CHANGE UP (ref 13–44)
MCHC RBC-ENTMCNC: 30.2 PG — SIGNIFICANT CHANGE UP (ref 27–34)
MCHC RBC-ENTMCNC: 31.8 GM/DL — LOW (ref 32–36)
MCV RBC AUTO: 94.7 FL — SIGNIFICANT CHANGE UP (ref 80–100)
MONOCYTES # BLD AUTO: 0.58 K/UL — SIGNIFICANT CHANGE UP (ref 0–0.9)
MONOCYTES NFR BLD AUTO: 11.5 % — SIGNIFICANT CHANGE UP (ref 2–14)
NEUTROPHILS # BLD AUTO: 3.67 K/UL — SIGNIFICANT CHANGE UP (ref 1.8–7.4)
NEUTROPHILS NFR BLD AUTO: 72.8 % — SIGNIFICANT CHANGE UP (ref 43–77)
NRBC # BLD: 0 /100 WBCS — SIGNIFICANT CHANGE UP (ref 0–0)
PLATELET # BLD AUTO: 212 K/UL — SIGNIFICANT CHANGE UP (ref 150–400)
POTASSIUM SERPL-MCNC: 4.3 MMOL/L — SIGNIFICANT CHANGE UP (ref 3.5–5.3)
POTASSIUM SERPL-SCNC: 4.3 MMOL/L — SIGNIFICANT CHANGE UP (ref 3.5–5.3)
PROT SERPL-MCNC: 7.6 G/DL — SIGNIFICANT CHANGE UP (ref 6–8.3)
PROTHROM AB SERPL-ACNC: 12.3 SEC — SIGNIFICANT CHANGE UP (ref 10.6–13.6)
RBC # BLD: 3.98 M/UL — LOW (ref 4.2–5.8)
RBC # FLD: 12.8 % — SIGNIFICANT CHANGE UP (ref 10.3–14.5)
SODIUM SERPL-SCNC: 139 MMOL/L — SIGNIFICANT CHANGE UP (ref 135–145)
TSH SERPL-MCNC: 2.7 UIU/ML — SIGNIFICANT CHANGE UP (ref 0.35–4.94)
WBC # BLD: 5.04 K/UL — SIGNIFICANT CHANGE UP (ref 3.8–10.5)
WBC # FLD AUTO: 5.04 K/UL — SIGNIFICANT CHANGE UP (ref 3.8–10.5)

## 2021-02-25 PROCEDURE — 80053 COMPREHEN METABOLIC PANEL: CPT

## 2021-02-25 PROCEDURE — 85025 COMPLETE CBC W/AUTO DIFF WBC: CPT

## 2021-02-25 PROCEDURE — 84443 ASSAY THYROID STIM HORMONE: CPT

## 2021-02-25 PROCEDURE — 99213 OFFICE O/P EST LOW 20 MIN: CPT

## 2021-02-25 PROCEDURE — 85610 PROTHROMBIN TIME: CPT

## 2021-02-25 PROCEDURE — 36415 COLL VENOUS BLD VENIPUNCTURE: CPT

## 2021-02-25 PROCEDURE — 85730 THROMBOPLASTIN TIME PARTIAL: CPT

## 2021-02-26 ENCOUNTER — RESULT REVIEW (OUTPATIENT)
Age: 81
End: 2021-02-26

## 2021-02-26 ENCOUNTER — OUTPATIENT (OUTPATIENT)
Dept: OUTPATIENT SERVICES | Facility: HOSPITAL | Age: 81
LOS: 1 days | End: 2021-02-26
Payer: MEDICARE

## 2021-02-26 ENCOUNTER — APPOINTMENT (OUTPATIENT)
Dept: INTERVENTIONAL RADIOLOGY/VASCULAR | Facility: HOSPITAL | Age: 81
End: 2021-02-26
Payer: MEDICARE

## 2021-02-26 DIAGNOSIS — Z90.89 ACQUIRED ABSENCE OF OTHER ORGANS: Chronic | ICD-10-CM

## 2021-02-26 DIAGNOSIS — C32.3 MALIGNANT NEOPLASM OF LARYNGEAL CARTILAGE: Chronic | ICD-10-CM

## 2021-02-26 DIAGNOSIS — Z41.9 ENCOUNTER FOR PROCEDURE FOR PURPOSES OTHER THAN REMEDYING HEALTH STATE, UNSPECIFIED: Chronic | ICD-10-CM

## 2021-02-26 DIAGNOSIS — Z90.49 ACQUIRED ABSENCE OF OTHER SPECIFIED PARTS OF DIGESTIVE TRACT: Chronic | ICD-10-CM

## 2021-02-26 LAB
ALBUMIN FLD-MCNC: 2.6 G/DL — SIGNIFICANT CHANGE UP
B PERT IGG+IGM PNL SER: SIGNIFICANT CHANGE UP
COLOR FLD: SIGNIFICANT CHANGE UP
COMMENT - FLUIDS: SIGNIFICANT CHANGE UP
COMMENT - FLUIDS: SIGNIFICANT CHANGE UP
EOSINOPHIL # FLD: 2 % — SIGNIFICANT CHANGE UP
FLUID INTAKE SUBSTANCE CLASS: SIGNIFICANT CHANGE UP
FLUID SEGMENTED GRANULOCYTES: 4 % — SIGNIFICANT CHANGE UP
GLUCOSE FLD-MCNC: 82 MG/DL — SIGNIFICANT CHANGE UP
GRAM STN FLD: SIGNIFICANT CHANGE UP
LDH SERPL L TO P-CCNC: 153 U/L — SIGNIFICANT CHANGE UP
LYMPHOCYTES # FLD: 68 % — SIGNIFICANT CHANGE UP
MESOTHL CELL # FLD: 1 % — SIGNIFICANT CHANGE UP
MONOS+MACROS # FLD: 25 % — SIGNIFICANT CHANGE UP
PROT FLD-MCNC: 4.1 G/DL — SIGNIFICANT CHANGE UP
RAPID RVP RESULT: SIGNIFICANT CHANGE UP
RCV VOL RI: HIGH /UL (ref 0–0)
SARS-COV-2 RNA SPEC QL NAA+PROBE: SIGNIFICANT CHANGE UP
SARS-COV-2 RNA SPEC QL NAA+PROBE: SIGNIFICANT CHANGE UP
SPECIMEN SOURCE FLD: SIGNIFICANT CHANGE UP
SPECIMEN SOURCE: SIGNIFICANT CHANGE UP
TOTAL NUCLEATED CELL COUNT, BODY FLUID: 1051 /UL — SIGNIFICANT CHANGE UP
TUBE TYPE: SIGNIFICANT CHANGE UP

## 2021-02-26 PROCEDURE — 89051 BODY FLUID CELL COUNT: CPT

## 2021-02-26 PROCEDURE — 0225U NFCT DS DNA&RNA 21 SARSCOV2: CPT

## 2021-02-26 PROCEDURE — 87075 CULTR BACTERIA EXCEPT BLOOD: CPT

## 2021-02-26 PROCEDURE — 84157 ASSAY OF PROTEIN OTHER: CPT

## 2021-02-26 PROCEDURE — 88305 TISSUE EXAM BY PATHOLOGIST: CPT | Mod: 26

## 2021-02-26 PROCEDURE — 87205 SMEAR GRAM STAIN: CPT

## 2021-02-26 PROCEDURE — 71045 X-RAY EXAM CHEST 1 VIEW: CPT

## 2021-02-26 PROCEDURE — 88112 CYTOPATH CELL ENHANCE TECH: CPT | Mod: 26

## 2021-02-26 PROCEDURE — 82945 GLUCOSE OTHER FLUID: CPT

## 2021-02-26 PROCEDURE — 82042 OTHER SOURCE ALBUMIN QUAN EA: CPT

## 2021-02-26 PROCEDURE — 87070 CULTURE OTHR SPECIMN AEROBIC: CPT

## 2021-02-26 PROCEDURE — 88305 TISSUE EXAM BY PATHOLOGIST: CPT

## 2021-02-26 PROCEDURE — 83615 LACTATE (LD) (LDH) ENZYME: CPT

## 2021-02-26 PROCEDURE — 32555 ASPIRATE PLEURA W/ IMAGING: CPT

## 2021-02-26 PROCEDURE — U0005: CPT

## 2021-02-26 PROCEDURE — 88112 CYTOPATH CELL ENHANCE TECH: CPT

## 2021-02-26 PROCEDURE — U0003: CPT

## 2021-02-26 PROCEDURE — 71045 X-RAY EXAM CHEST 1 VIEW: CPT | Mod: 26

## 2021-02-26 PROCEDURE — 32555 ASPIRATE PLEURA W/ IMAGING: CPT | Mod: LT

## 2021-02-26 NOTE — ASSESSMENT
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 Trouble swallowing since that time, eats a normal diet but sometimes aspirates and coughs . 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 Bronchial margin negative. He presents today to review CT chest. He was initially referred by Dr. Pandya. \par \par Today the patient reports fatigue. Denies cough, SOB, and unintentional weight loss. Surveillance CT chest completed on 2/11/21: was reviewed which revealed interval left upper lobectomy without suspicious focal abnormality. New moderate left pleural effusion. Discussed that this could be post operative changes filling in the space of the previous lung. Will set patient up for thoracentesis . Patient reports that he has a PET scan ordered by Dr. Pandya in April 2020. Will order this sooner. Labs drawn in office today. He should RTC after above. \par \par PLAN:\par 1. Thoracentesis on left pleural effusion on 2/26/21\par 2. PET scan\par 3. RTC\par \par

## 2021-02-26 NOTE — HISTORY OF PRESENT ILLNESS
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 Trouble swallowing since that time, eats a normal diet but sometimes aspirates and coughs . 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 Bronchial margin negative. He presents today to review CT chest. He was initially referred by Dr. Pandya. \par \par CT chest done on 7/15/20:\par - Mass/consolidation seen in the superior lingula has increased in size as compared to the CT chest of 4/18/2017 and PET CT 10/25/2018. Findings can be correlated with bronchoscopy for tissue sampling. Differential diagnosis would include organizing pneumonia. Other lung nodules are unchanged. No adenopathy.\par \par Lung, left lingula, biopsy and touch prep: on 9/1/20:\par -Nonkeratinizing squamous cell carcinoma, see note\par Note: Immunostain workup reveals strong positive staining for\par p40. The tumor cells are negative for TTF1-naspsin (duel stain).\par Staining with p16 is is patchy. Strong, diffuse staining with p16\par is not observed.\par - nodes and BAL negative for malignancy \par \par CT chest done on 7/15/20:\par - Mass/consolidation seen in the superior lingula has increased in size as compared to the CT chest of 4/18/2017 and PET CT 10/25/2018. Findings can be correlated with bronchoscopy for tissue sampling. Differential diagnosis would include organizing pneumonia. Other lung nodules are unchanged. No adenopathy.\par \par Lung, left lingula, biopsy and touch prep: on 9/1/20:\par -Nonkeratinizing squamous cell carcinoma, see note\par Note: Immunostain workup reveals strong positive staining for\par p40. The tumor cells are negative for TTF1-naspsin (duel stain).\par Staining with p16 is is patchy. Strong, diffuse staining with p16\par is not observed.\par - nodes and BAL negative for malignancy \par \par CT chest completed on 2/11/21:\par -Interval left upper lobectomy without suspicious focal abnormality. New moderate left pleural effusion.\par \par

## 2021-02-26 NOTE — PHYSICAL EXAM
[Neck Appearance] : the appearance of the neck was normal [Neck Cervical Mass (___cm)] : no neck mass was observed [] : no respiratory distress [Respiration, Rhythm And Depth] : normal respiratory rhythm and effort [Exaggerated Use Of Accessory Muscles For Inspiration] : no accessory muscle use [Abnormal Walk] : normal gait [Nail Clubbing] : no clubbing  or cyanosis of the fingernails [Musculoskeletal - Swelling] : no joint swelling seen [Oriented To Time, Place, And Person] : oriented to person, place, and time [Impaired Insight] : insight and judgment were intact [Affect] : the affect was normal

## 2021-03-01 LAB — NON-GYNECOLOGICAL CYTOLOGY STUDY: SIGNIFICANT CHANGE UP

## 2021-03-04 LAB
CULTURE RESULTS: NO GROWTH — SIGNIFICANT CHANGE UP
SPECIMEN SOURCE: SIGNIFICANT CHANGE UP

## 2021-03-16 ENCOUNTER — RESULT REVIEW (OUTPATIENT)
Age: 81
End: 2021-03-16

## 2021-03-16 ENCOUNTER — OUTPATIENT (OUTPATIENT)
Dept: OUTPATIENT SERVICES | Facility: HOSPITAL | Age: 81
LOS: 1 days | End: 2021-03-16
Payer: MEDICARE

## 2021-03-16 DIAGNOSIS — Z90.49 ACQUIRED ABSENCE OF OTHER SPECIFIED PARTS OF DIGESTIVE TRACT: Chronic | ICD-10-CM

## 2021-03-16 DIAGNOSIS — Z41.9 ENCOUNTER FOR PROCEDURE FOR PURPOSES OTHER THAN REMEDYING HEALTH STATE, UNSPECIFIED: Chronic | ICD-10-CM

## 2021-03-16 DIAGNOSIS — Z90.89 ACQUIRED ABSENCE OF OTHER ORGANS: Chronic | ICD-10-CM

## 2021-03-16 DIAGNOSIS — C32.3 MALIGNANT NEOPLASM OF LARYNGEAL CARTILAGE: Chronic | ICD-10-CM

## 2021-03-16 LAB — GLUCOSE BLDC GLUCOMTR-MCNC: 99 MG/DL — SIGNIFICANT CHANGE UP (ref 70–99)

## 2021-03-16 PROCEDURE — 82962 GLUCOSE BLOOD TEST: CPT

## 2021-03-16 PROCEDURE — 78815 PET IMAGE W/CT SKULL-THIGH: CPT | Mod: 26,MH

## 2021-03-16 PROCEDURE — A9552: CPT

## 2021-03-16 PROCEDURE — 78815 PET IMAGE W/CT SKULL-THIGH: CPT

## 2021-03-25 ENCOUNTER — APPOINTMENT (OUTPATIENT)
Dept: THORACIC SURGERY | Facility: CLINIC | Age: 81
End: 2021-03-25
Payer: MEDICARE

## 2021-03-25 VITALS
RESPIRATION RATE: 18 BRPM | HEART RATE: 82 BPM | DIASTOLIC BLOOD PRESSURE: 72 MMHG | TEMPERATURE: 98 F | HEIGHT: 72 IN | SYSTOLIC BLOOD PRESSURE: 155 MMHG | BODY MASS INDEX: 18.01 KG/M2 | OXYGEN SATURATION: 96 % | WEIGHT: 133 LBS

## 2021-03-25 DIAGNOSIS — J90 PLEURAL EFFUSION, NOT ELSEWHERE CLASSIFIED: ICD-10-CM

## 2021-03-25 PROCEDURE — 99213 OFFICE O/P EST LOW 20 MIN: CPT

## 2021-03-25 NOTE — PHYSICAL EXAM
[Neck Appearance] : the appearance of the neck was normal [Neck Cervical Mass (___cm)] : no neck mass was observed [Respiration, Rhythm And Depth] : normal respiratory rhythm and effort [Exaggerated Use Of Accessory Muscles For Inspiration] : no accessory muscle use [Examination Of The Chest] : the chest was normal in appearance [Abnormal Walk] : normal gait [Musculoskeletal - Swelling] : no joint swelling seen [Skin Color & Pigmentation] : normal skin color and pigmentation [Skin Turgor] : normal skin turgor [] : no rash [Oriented To Time, Place, And Person] : oriented to person, place, and time [Impaired Insight] : insight and judgment were intact [Affect] : the affect was normal

## 2021-03-30 NOTE — ASSESSMENT
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 Trouble swallowing since that time, eats a normal diet but sometimes aspirates and coughs . 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 Bronchial margin negative. He presents today to review PET scan and  CXR. He was initially referred by Dr. Pandya. \par \par Today the patient reports fatigue. He denies cough, fever and hemoptysis. He admits that his SOB is improving. \par Left pleural fluid thoracentesis on 2/26/21: was reviewed which was nondiagnostic. The specimen is composed of blood only. Explained that these are only accurate 50% of the time. If his SOB increases I may recommend getting a repeat thoracentesis. No intervention needed at this time. Discussed that the pleural effusion was mostly likely there filling up the space from surgery. Patient refused CXR today.\par \par PET scan completed on 3/16/21:was reviewed which revealed a Small left pleural effusion without increased FDG uptake.  Post left upper lobectomy. No FDG avid pulmonary nodules. Will order a repeat CT chest in 3 months for close surveillance. If the CT chest is stable we will continue with surveillance every 6 months. \par \par PLAN:\par 1. CT chest in 3 months\par

## 2021-03-30 NOTE — HISTORY OF PRESENT ILLNESS
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 Trouble swallowing since that time, eats a normal diet but sometimes aspirates and coughs . 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 Bronchial margin negative. He presents today to review PET scan and CXR. He was initially referred by Dr. Pandya. \par \par CT chest done on 7/15/20:\par - Mass/consolidation seen in the superior lingula has increased in size as compared to the CT chest of 4/18/2017 and PET CT 10/25/2018. Findings can be correlated with bronchoscopy for tissue sampling. Differential diagnosis would include organizing pneumonia. Other lung nodules are unchanged. No adenopathy.\par \par Lung, left lingula, biopsy and touch prep: on 9/1/20:\par -Nonkeratinizing squamous cell carcinoma, see note\par Note: Immunostain workup reveals strong positive staining for\par p40. The tumor cells are negative for TTF1-naspsin (duel stain).\par Staining with p16 is is patchy. Strong, diffuse staining with p16\par is not observed.\par - nodes and BAL negative for malignancy \par \par CT chest done on 7/15/20:\par - Mass/consolidation seen in the superior lingula has increased in size as compared to the CT chest of 4/18/2017 and PET CT 10/25/2018. Findings can be correlated with bronchoscopy for tissue sampling. Differential diagnosis would include organizing pneumonia. Other lung nodules are unchanged. No adenopathy.\par \par Lung, left lingula, biopsy and touch prep: on 9/1/20:\par -Nonkeratinizing squamous cell carcinoma, see note\par Note: Immunostain workup reveals strong positive staining for\par p40. The tumor cells are negative for TTF1-naspsin (duel stain).\par Staining with p16 is is patchy. Strong, diffuse staining with p16\par is not observed.\par - nodes and BAL negative for malignancy \par \par CT chest completed on 2/11/21:\par -Interval left upper lobectomy without suspicious focal abnormality. New moderate left pleural effusion.\par \par Left pleural fluid thoracentesis on 2/26/21:\par -LEFT PLEURAL FLUID, THORACENTESIS:\par NONDIAGNOSTIC\par The specimen is composed of blood only.\par \par PET scan completed on 3/16/21:\par 1. Small left pleural effusion without increased FDG uptake.\par 2. Post left upper lobectomy. No FDG avid pulmonary nodules.\par \par

## 2021-03-30 NOTE — END OF VISIT
[Time Spent: ___ minutes] : I have spent [unfilled] minutes of time on the encounter. [FreeTextEntry3] : I, ONDINA MONTERROSO , am scribing for and in the presence of IVÁN MCGILL the following sections: history of present illness, past medical/family/surgical/family/social history, review of systems, vital signs, physical exam, and disposition.\par \par

## 2021-04-29 ENCOUNTER — APPOINTMENT (OUTPATIENT)
Dept: RADIATION ONCOLOGY | Facility: CLINIC | Age: 81
End: 2021-04-29
Payer: MEDICARE

## 2021-04-29 VITALS
TEMPERATURE: 98.1 F | OXYGEN SATURATION: 100 % | BODY MASS INDEX: 18.34 KG/M2 | WEIGHT: 135.4 LBS | DIASTOLIC BLOOD PRESSURE: 81 MMHG | SYSTOLIC BLOOD PRESSURE: 138 MMHG | RESPIRATION RATE: 18 BRPM | HEART RATE: 76 BPM | HEIGHT: 72 IN

## 2021-04-29 PROCEDURE — 99214 OFFICE O/P EST MOD 30 MIN: CPT

## 2021-04-29 RX ORDER — LORAZEPAM 0.5 MG/1
0.5 TABLET ORAL
Qty: 2 | Refills: 0 | Status: DISCONTINUED | COMMUNITY
Start: 2020-09-17 | End: 2021-04-29

## 2021-04-29 RX ORDER — GABAPENTIN 100 MG/1
100 CAPSULE ORAL 3 TIMES DAILY
Qty: 90 | Refills: 0 | Status: DISCONTINUED | COMMUNITY
Start: 2020-11-05 | End: 2021-04-29

## 2021-04-29 RX ORDER — LOSARTAN POTASSIUM 25 MG/1
25 TABLET, FILM COATED ORAL DAILY
Qty: 30 | Refills: 6 | Status: DISCONTINUED | COMMUNITY
Start: 2020-10-08 | End: 2021-04-29

## 2021-04-29 NOTE — REVIEW OF SYSTEMS
[Odynophagia] : odynophagia [SOB on Exertion] : shortness of breath during exertion [Negative] : Allergic/Immunologic [Cough: Grade 0] : Cough: Grade 0 [Dyspnea: Grade 0] : Dyspnea: Grade 0 [Hiccups: Grade 0] : Hiccups: Grade 0 [Hoarseness: Grade 0] : Hoarseness: Grade 0 [Hypoxia: Grade 0] : Hypoxia: Grade 0 [Pharyngeal Mucositis: Grade 0] : Pharyngeal Mucositis: Grade 0 [Pneumonitis: Grade 0] : Pneumonitis: Grade 0 [Voice Alteration: Grade 0] : Voice Alteration: Grade 0 [Dysphagia] : no dysphagia [FreeTextEntry6] : SOB on exertion, while walking with N95 mask

## 2021-04-29 NOTE — PHYSICAL EXAM
[Thin] : thin [Sclera] : the sclera and conjunctiva were normal [Extraocular Movements] : extraocular movements were intact [Outer Ear] : the ears and nose were normal in appearance [Hearing Threshold Finger Rub Not [place]] : hearing was normal [Heart Rate And Rhythm] : heart rate and rhythm were normal [Heart Sounds] : normal S1 and S2 [Respiration, Rhythm And Depth] : normal respiratory rhythm and effort [Exaggerated Use Of Accessory Muscles For Inspiration] : no accessory muscle use [Auscultation Breath Sounds / Voice Sounds] : lungs were clear to auscultation bilaterally [Arterial Pulses Normal] : the arterial pulses were normal [Edema] : no peripheral edema present [Bowel Sounds] : normal bowel sounds [Abdomen Soft] : soft [Nondistended] : nondistended [Abdomen Tenderness] : non-tender [Normal] : normal external genitalia without lesions and no testicular masses [Normal] : normal skin color and pigmentation and no rash [Oriented To Time, Place, And Person] : oriented to person, place, and time [Affect] : the affect was normal [Mood] : the mood was normal [de-identified] : Fair dentition throughout. scar tissue of right soft palate and tonsil c/w prior treatment change  [de-identified] : Left neck fibrosis > Right  [de-identified] : left shoulder depressed compared with right

## 2021-04-29 NOTE — HISTORY OF PRESENT ILLNESS
[FreeTextEntry1] : Mr. Fer Franklin is well known to us from 2 prior courses of treatment:\par 1. 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 \par 2. 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  He underwent left upper lobectomy on 10/26/20 showing 3.5cm NATALIIA SCC, negative margins, compatible with metastatic SCC.\par \par 4/28/21 Follow up:\par He was seen today for follow up. Reports he has difficulty swallowing and he takes small bites to swallow. He also reports SOB on exertion and walking with his N95 mask. He reports being vaccinated with Moderna, last dose 3/18/21 for COVID-19. He followed up with BILL Kulkarni had blood work and a sonogram. He will follow up with Dr. Wilson in June. He was seen by a dentist last month. Otherwise reports he is in good health. He had a CT Chest 2/11/21 and PET CT 3/16/21 since I saw him last visit. \par \par CT Chest 2/11/21: Notable for Left pleural fluid thoracentesis on 2/26/21 - non diagnostic. \par PET/CT 3/16/21 - no evidence of recurrent or metastatic disease. small left pleural effusion. \par \par Dr. David Briscoe - urologist - pt scheduled for MRI of the prostate soon for elevated PSA. \par Dr. Cayla Hendrix - PCP - managing his thyroid medicine, BP medicine. \par Dentist: Seen last month\par Dr. Wilson VANNA- he is scheduled to be seen in June\par \par \par 9/24/20 Follow up \par PET/CT 9/21/20\par - FDG avid left lingular mass corresponding to patient's known malignancy.\par - Focal FDG uptake adjacent to the right maxillary first and third molars which may represent periodontal disease, posttreatment changes or neoplasm and correlation with physical examination is recommended.\par - No additional sites of abnormal FDG uptake to suggest biologic tumor activity.\par \par MRI brain 9/21/20\par - No evidence of intracranial metastatic disease.\par \par He is scheduled to see Dr. Lopez this morning.  Today, he reports feeling well and is without complaints. He is eager to learn more about treatment options for left lung cancer with radiation as well as surgery. \par \par 9/16/20 - Follow-up\par Mr. Franklin returns for follow-up.  He was last seen on 7/30/20.  At that time, head and neck exam showed CHEY, but recent CT chest showed an enlarging left lingula mass/consolidation.  Plan was for referral to pulmonary Dr. Jaquez for further work-up of CT findings, continue head and neck exercises, resume lymphedema therapy, SLP follow-up for occasional coughing/gagging with eating, continue routine dental f/u and PreviDent, PET/CT in 8 months.  He underwent bronchoscopy with biopsies on 9/8/20.  Pathology as follows: \par \par Lung, left lingula, biopsy and touch prep \par - Nonkeratinizing squamous cell carcinoma, see note\par - Note: Immunostain workup reveals strong positive staining for p40. The tumor cells are negative for TTF1-naspsin (duel stain).\par Staining with p16 is is patchy. Strong, diffuse staining with p16 is not observed.\par - The patient is noted to have history of squamous cell carcinoma of the oropharynx (per the 2014 report, the carcinoma was negative for HPV RODRIGUEZ 16/11, 16/18, and 31/33. p16 staining was not performed).  This current lesion may represent a primary lung squamous cell carcinoma or possible metastasis from the oropharynx. Clinical correlation advised.\par LN 11L, FNA - negative for malignant cells\par LN 4L, FNA- nondiagnostic \par LN 7, FNA - nondiagnostic\par LN 11 R, FNA - negative for malignant cells\par BAL left lingular - negative for malignant cells\par Lung, left lingula, FNA - negative for malignant cells \par \par Today, he reports feeling stable since his last visit, "nothing has changed."  He continues to have dysphagia with dry foods and occasional cough when swallowing.  He has not followed up with SLP.  He did not resume lymphedema therapy because "I just didn't do it."  He is doing head and neck exercises on his own.  He saw dental 6 weeks ago.  He is not using PreviDent.  He had a basal cell carcinoma removed from his left ear a few weeks ago.  He denies pain, odynophagia, dyspnea, cough.  \par \par 7/30/20- Follow-up\par Mr. Franklin returns for routine follow-up.  He had a telephone follow-up on 4/28/20.  Plan was for CT and in-office exam between June and August post acute phase of COVID, f/u with Dr. Wilson, dental and PMD.  \par \par CT neck with contrast done 7/14/2020:\par There remains no evidence of local or regional radha recurrence of oropharyngeal and laryngeal carcinomas, though assessment of the palate and oral cavity is limited by dental artifact. Is likely that PET/CT will be more accurate in the detection of small volume disease recurrence.\par \par CT chest with contrast done 7/14/2020: \par -Mass/consolidation seen in the superior lingula has increased in size as compared to the CT chest of 4/18/2017 and PET CT 10/25/2018. Findings can be correlated with bronchoscopy for tissue sampling. Differential diagnosis would include organizing pneumonia.\par -Other lung nodules are unchanged.\par -No adenopathy.\par \par He last saw Dr. Wilson on 6/3/20; plan is for f/u in 5-6 months.  He last saw dental a month ago.  He has not seen PCP recently (last visit was in February 2020).  Today, he notes he is stable in terms of dysphagia when eating dry foods. Also notes mild xerostomia at times, but it is not bothersome. Of note, he chipped a tooth yesterday. He also reports he had excision of basal cell carcinoma on left external ear on Monday. He saw dermatologist Dr. Saeed today for follow up and will see her again next week. He reports he does H&N exercises "fairly regularly." He denies any further complaints to include fever, chills, cough, CP, SOB. \par \par \par 4/28/20 - Follow-up by Telephone due to COVID-19 (Patient has no video capability)\par Mr. Franklin is being seen via telehealth for routine follow-up.  He was last seen on 11/14/19.  Plan was for CT neck and chest in April, continue follow-up with dental, PMD, and Dr. Álvarez, continue PreviDent, head and neck exercises, alcohol reduction.  \par He was seen by his ENT in [place] in February who examined him and said he was cancer-free. He was given a report to send to us. \par Today, he feels stable. Throat is not getting worse. He also recently cancelled an in-person appt with Dr. Wilson. He continues to do his H&N exercises. He last saw Namrata Vicente OT in November.\par \par 11/14/19 - Follow-up \par Mr. Franklin returns today for routine follow-up.  He was last seen on 5/2/19.  Plan was for xerostomia strategies, head and neck exercises, OT for lymphedema therapy, continue f/u with dental and PMD, f/u SLP for any worsening symptoms, f/u with GI for endoscopies PRN.  He was also advised on alcohol reduction and routine health maintenance.  He last saw dental in July, will follow-up again next week.  He last saw his PMD one week ago.  Today, he reports feeling well.  He notes stable dysphagia and odynophagia with dry foods and stable xerostomia.  He denies other complaints to include pain, dyspnea, chest pain and palpitations.  He does see Namrata Vicente OT for lymphedema therapy.  He denies smoking.  Drinks a glass of wine with dinner most nights.  \par Will be traveling to [place] for winter.\par \par 5/2/19 - Follow-up\par Mr. Franklin returns today for routine follow-up.  He was last seen here on 11/5/18.  Plan at that time was for repeat CT neck in 4 months, f/u SLP, f/u OT for lymphedema treatment, f/u with dental.  Self-care measures (xerostomia management, head and neck exercises) also reviewed at that time.  He spent most of the winter in [place] and is now back in town. \par \par CT neck with contrast 4/23/19 - Impression: No evidence of local or regional radha recurrence of squamous cell carcinoma.  \par \par He last saw SLP in October 2018. He last saw dental in April.  He last saw Dr. Wilson on 4/9/19.  He reports having been started on synthroid by his PMD approximately 6 months ago.  He is scheduled to follow-up with PMD in 1 week.  Today, he reports feeling well.  He continues to have xerostomia.  He is using baking soda rinses with little relief.  He also reports dysphagia and odynophagia that he relates to xerostomia, worst with dry foods.  He states he is eating 3 full meals per day, but tries to eat soft foods that are easier to swallow.  He states he has stopped doing head and neck exercises. He drinks 1-2 glasses of wine most nights with dinner. He feels his GI complaints (dry food getting stuck in lower neck) are stable and he does not need GI follow up. Has used PPI in past with no relief. Has a GI doc locally who has done endoscopies 3 years ago. \par \par \par 11/5/18-Follow up\par At his last visit he complained of epigastric burning and dysphagia. \par PET/CT showed some streak uptake in soft palate with plan to repeat  PET/CT in 3 months.  He f/u with SLP 10/16/18 for dysphagia therapy. He also followed up with Dr. Wilson 9/18/18 and complained of lower chest discomfort upon eating. Barium swallow showed no evidence of esophageal dysmotility, no mass, stricture or other esophageal abnormality was seen.\par \par PET/CT 10/25/18\par IMPRESSION: \par 1. Only mild residual activity along the left side the hard palate, unchanged since 7/25/2018. Faint activity previously seen along the right oropharyngeal wall is no longer present. No regional or distal hypermetabolic lymphadenopathy. \par 2. Continued low-level activity within an area of atelectasis versus scarring in the left upper lobe lingula (maximum SUV 2.8), relatively unchanged since 7/25/2018 however moderately improved/reduced since 3/24/2016. \par \par He was seen by Sang Arellano on 10/16/18: "patient underwent a repeat modified barium swallow study on 9/18/18.  Exam showed a mild pharyngeal dysphagia with penetration on cup sips of thin liquid with trace silent aspiration on initial trial, as well as mild pharyngeal residue after the swallow.  Patient recommended to continue on mechanical soft solid diet with thin liquids with periodic throat clearing to reduce risk of aspiration and continuation of dysphagia therapy."\par \par Today he states that he feels generally well. He states that he continues to have intermittent epigastric pain with swallowing various foods. He eats mainly soft textured foods. He notes dry mouth for which he uses bicarbonate rinses . He does H&N exercises. He has followed up with his PMD and has been seeing his dentist regularly. He does not want to use a PPI or see GI for further workup of his epigastric discomfort.\par \par \par 8/2/18\par Mr. Fer Franklin presents today for routine follow up and to review PET/CT results. 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 He was found to have recurrence in the Rt soft palate, Opx 1/2018, and completed 7000 cGy to the oropharynx from 3/12/18-4/27/18 for treatment of recurrent early stage oral SCC.\par \par He last saw us 7/10/18 and was CHEY on exam. He was advised to resume PPI for GERD symptoms, discontinue gabapentin and continue dental care and rinses for dry mouth\par \par PET scan on 7/25/18 showed improvement in right oropharyngeal and hard palate abnormality. There were small residual foci of increased metabolic activity remaining in the right lateral oropharynx and on the left side of the hard palate. There was a decrease in the size of a left lung nodule associated with bronchiectasis and scarring, probably representing post inflammatory change. No change in a few sclerotic bone lesions. \par \par Today he feels generally well. He notes burning epigastric pain with swallowing 3/10. He has f/u with SLP for dysphagia with recommendation to continue H&N/swallowing exercises with plan to do modified barium swallow in no improvement at next f/u in 4-6 weeks. he has also f/u with Dr. Wilson and was found to be doing well.\par \par \par ONCOLOGY HISTORY\par Mr. Franklin is a 76yo man with squamous cell carcinoma of the left supraglottic larynx and high-grade dysplasia of the right vallecula, [id number] (ROMIE), HPV-negative. He now returns with a NEW biopsy proven SCC of the Right soft palate, hard palate, and RMT that is outside of prior radiation field.\par \par He is s/p concurrent radiation therapy with chemotherapy completed 6/23/16 with curative intent. Treatment was directed at the supraglottic larynx, vallecula/tongue base, and bilateral necks. Chemotherapy was weekly cisplatin given by Dr. Dowell.\par \par Mr. 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 Wilson. Pathology revealed infiltrating squamous cell carcinoma, depth of invasion 2mm, arising in ulcerated inflamed mucosa with high grade dysplasia.  The inferior margin showed squamous mucosa, positive for high grade dysplasia. Anterior margin showed focally disrupted squamous mucosa negative for dysplasia, and dislodged fragment of high grade dysplasia. Second right oropharynx showed at least high grade dysplasia, suspicious for superficial invasion, and the tumor of the right soft palate showed high grade dysplasia, with brisk underlying lymphoplasmacytic infiltrate. \par \par Mr. Franklin presents today for further consideration for radiation therapy. He still has significant throat pain after surgery. He is not eating and drinking well because of this. He is eating mostly papaya, and notes he has lost 10 pounds since surgery as judged by his home scale. He has a slight cough. His energy levels are significantly decreased, and he has felt like he has a fever on and off. He is not taking tylenol despite significant pain at the surgical site.\par \par He continues with head and neck exercises, and sees a dentist every 4 months. He is not using flouride toothpaste. \par He has dry mouth as well. \par \par TREATMENT SUMMARY: \par Treatment Site: Larynx, Oropharynx, Necks \par Total dose 7,000cGy / 35 fractions with dose-painting\par Treatment Dates: 5/05/2016 to 6/23/2016\par \par He did not have any unexpected treatment complications during the course of treatment.  He tolerated treatment exceptionally well and did not require narcotic pain medications. He maintained an adequate weight and did not have any breaks in radiation or chemotherapy. \par \par ONCOLOGIC HISTORY: \par Mr. 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 He was monitored every 3 months in [place] by Dr. Álvarez and also in [place] by Dr. Bao Sanders. His visit to Dr. Leon in February 2016 was notable for a concerning lesion arising at the right vallecula/right lateral pharyngeal wall. This was separate from the previously treated lesion in the left AE fold that had been removed by laser. On March 2, 2016, Dr. Leon biopsied the right vallecula which showed squamous proliferative papillary lesion with high-grade dysplasia, and the left aryepiglottic fold which showed squamous cell carcinoma. Pathology review at [facility] confirmed the diagnosis of SCC in the left AE fold and squamous dysplasia with high-grade features in the right vallecula. PET/CT scan showed a maximum SUV of 9.2 in the right vallecular area as well as a 1.6 cm left cervical lymph node with maximum SUV of 9.8. FNA of the left neck node on 4/7/16 showed SCC.  \par \par 1/29/18- Mr. Franklin is a 76yo man with squamous cell carcinoma of the left supraglottic larynx and high-grade dysplasia of the right vallecula, [id number] (ROMIE), HPV-negative. He now returns with a NEW biopsy proven SCC of the Right soft palate, hard palate, and RMT that is outside of prior radiation field.\par He is s/p concurrent radiation therapy with chemotherapy completed 6/23/16 with curative intent. Treatment was directed at the supraglottic larynx, vallecula/tongue base, and bilateral necks. Chemotherapy was weekly cisplatin given by Dr. Dowell.\par \par Mr. Franklin underwent biopsy of Rt. retromolar trigone erythroleukoplakia on 12/15/17, which revealed severe dysplaia with focal ulceration, extending up to biopsy margins.\par On 1/23/18, the patient underwent biopsy of right soft palate, right oropharynx, and right piriform sinus with Dr. Wilson. Pathology revealed infiltrating squamous cell carcinoma, depth of invasion 2mm, arising in ulcerated inflamed mucosa with high grade dysplasia.  The inferior margin showed squamous mucosa, positive for high grade dysplasia. Anterior margin showed focally disrupted squamous mucosa negative for dysplasia, and dislodged fragment of high grade dysplasia. Second right oropharynx showed at least high grade dysplasia, suspicious for superficial invasion, and the tumor of the right soft palate showed high grade dysplasia, with brisk underlying lymphoplasmacytic infiltrate. \par \par At the time of initial re-consult for radiation therapy Mr. Franklin still had significant throat pain after surgery.\par \par 5/22/18- Mr. Fer Franklin presents today for post treatment evaluation.\par His weight is up two pounds from his last week of treatment today. Appetite is good, however he needs to make a conscious effort to eat and swallow. Most of his mouth has healed well, however he feels he still has pain to his right lower mouth. \par He has trouble swallowing dry things, and needs to sip extra water when he eats them. Taste sensation intact. Not using prevident because it burns too much. DOing head and neck exercises, and planning to make an appointment with [facility] speech and swallow. Energy is good. Thick saliva is improving. \par \par 7/10/18- Mr. Franklin feels well. Notes some discomfort with swallowing. Started thyroid medication, following with speech and swallow. Able to swallow liquids, trouble with dry foods.

## 2021-06-03 ENCOUNTER — APPOINTMENT (OUTPATIENT)
Dept: THORACIC SURGERY | Facility: CLINIC | Age: 81
End: 2021-06-03
Payer: MEDICARE

## 2021-06-03 ENCOUNTER — OUTPATIENT (OUTPATIENT)
Dept: OUTPATIENT SERVICES | Facility: HOSPITAL | Age: 81
LOS: 1 days | End: 2021-06-03
Payer: MEDICARE

## 2021-06-03 VITALS
BODY MASS INDEX: 17.34 KG/M2 | TEMPERATURE: 97.6 F | SYSTOLIC BLOOD PRESSURE: 172 MMHG | HEART RATE: 70 BPM | OXYGEN SATURATION: 98 % | HEIGHT: 72 IN | DIASTOLIC BLOOD PRESSURE: 77 MMHG | WEIGHT: 128 LBS | RESPIRATION RATE: 18 BRPM

## 2021-06-03 DIAGNOSIS — Z90.89 ACQUIRED ABSENCE OF OTHER ORGANS: Chronic | ICD-10-CM

## 2021-06-03 DIAGNOSIS — Z41.9 ENCOUNTER FOR PROCEDURE FOR PURPOSES OTHER THAN REMEDYING HEALTH STATE, UNSPECIFIED: Chronic | ICD-10-CM

## 2021-06-03 DIAGNOSIS — Z90.49 ACQUIRED ABSENCE OF OTHER SPECIFIED PARTS OF DIGESTIVE TRACT: Chronic | ICD-10-CM

## 2021-06-03 DIAGNOSIS — C32.3 MALIGNANT NEOPLASM OF LARYNGEAL CARTILAGE: Chronic | ICD-10-CM

## 2021-06-03 PROCEDURE — 71046 X-RAY EXAM CHEST 2 VIEWS: CPT | Mod: 26

## 2021-06-03 PROCEDURE — 99212 OFFICE O/P EST SF 10 MIN: CPT

## 2021-06-03 PROCEDURE — 71046 X-RAY EXAM CHEST 2 VIEWS: CPT

## 2021-06-03 NOTE — PHYSICAL EXAM
[Sclera] : the sclera and conjunctiva were normal [Neck Appearance] : the appearance of the neck was normal [] : no respiratory distress [Exaggerated Use Of Accessory Muscles For Inspiration] : no accessory muscle use [Apical Impulse] : the apical impulse was normal [Heart Sounds] : normal S1 and S2

## 2021-06-04 NOTE — REVIEW OF SYSTEMS
[Feeling Poorly] : feeling poorly [Feeling Tired] : feeling tired [Shortness Of Breath] : shortness of breath [SOB on Exertion] : shortness of breath during exertion [Fever] : no fever [Chills] : no chills [Eye Pain] : no eye pain [Chest Pain] : no chest pain [Palpitations] : no palpitations [Wheezing] : no wheezing [Cough] : no cough [Abdominal Pain] : no abdominal pain [Vomiting] : no vomiting [Confused] : no confusion [Convulsions] : no convulsions

## 2021-06-04 NOTE — HISTORY OF PRESENT ILLNESS
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 Trouble swallowing since that time, eats a normal diet but sometimes aspirates and coughs . 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 Bronchial margin negative. He presents today with a CXR. He was initially referred by Dr. Pandya. \par \par CT chest done on 7/15/20:\par - Mass/consolidation seen in the superior lingula has increased in size as compared to the CT chest of 4/18/2017 and PET CT 10/25/2018. Findings can be correlated with bronchoscopy for tissue sampling. Differential diagnosis would include organizing pneumonia. Other lung nodules are unchanged. No adenopathy.\par \par Lung, left lingula, biopsy and touch prep: on 9/1/20:\par -Nonkeratinizing squamous cell carcinoma, see note\par Note: Immunostain workup reveals strong positive staining for\par p40. The tumor cells are negative for TTF1-naspsin (duel stain).\par Staining with p16 is is patchy. Strong, diffuse staining with p16\par is not observed.\par - nodes and BAL negative for malignancy \par \par CT chest done on 7/15/20:\par - Mass/consolidation seen in the superior lingula has increased in size as compared to the CT chest of 4/18/2017 and PET CT 10/25/2018. Findings can be correlated with bronchoscopy for tissue sampling. Differential diagnosis would include organizing pneumonia. Other lung nodules are unchanged. No adenopathy.\par \par Lung, left lingula, biopsy and touch prep: on 9/1/20:\par -Nonkeratinizing squamous cell carcinoma, see note\par Note: Immunostain workup reveals strong positive staining for\par p40. The tumor cells are negative for TTF1-naspsin (duel stain).\par Staining with p16 is is patchy. Strong, diffuse staining with p16\par is not observed.\par - nodes and BAL negative for malignancy \par \par CT chest completed on 2/11/21:\par -Interval left upper lobectomy without suspicious focal abnormality. New moderate left pleural effusion.\par \par Left pleural fluid thoracentesis on 2/26/21:\par -LEFT PLEURAL FLUID, THORACENTESIS:\par NONDIAGNOSTIC\par The specimen is composed of blood only.\par \par PET scan completed on 3/16/21:\par 1. Small left pleural effusion without increased FDG uptake.\par 2. Post left upper lobectomy. No FDG avid pulmonary nodules.

## 2021-06-04 NOTE — ASSESSMENT
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 Trouble swallowing since that time, eats a normal diet but sometimes aspirates and coughs. 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 Bronchial margin negative. Pre op FEV1 84% and DLCO 101%. He was initially referred by Dr. Pandya. \par \par He presents today to review CXR, no acute findings on CXR. \par \par Today the patient reports fatigue and exertional dyspnea. He denies cough, fever and hemoptysis. He admits that his SOB is not improving. He says that he feels tired all the time.  Per patient he was seen by his PCP and had an echocardiogram done recently.  Will however make a referral for the patient to see a pulmonologist and have repeat PFTs done.  Advised patient that he may benefit from pulmonary rehabilitation.  Also advised patient to call the office if he starts feeling worse.  \par \par \par PLAN:\par 1. CT chest noncontrast for surveillance\par 2. pulmonary referral\par 3. schedule PFTs (will try to schedule all 3 on same day as patient is coming in from [place])\par 4. RTC after above done\par \par Questions answered and concerns addressed\par

## 2021-06-11 DIAGNOSIS — R18.8 OTHER ASCITES: ICD-10-CM

## 2021-06-15 ENCOUNTER — APPOINTMENT (OUTPATIENT)
Dept: OTOLARYNGOLOGY | Facility: CLINIC | Age: 81
End: 2021-06-15
Payer: MEDICARE

## 2021-06-15 ENCOUNTER — LABORATORY RESULT (OUTPATIENT)
Age: 81
End: 2021-06-15

## 2021-06-15 DIAGNOSIS — K11.7 DISTURBANCES OF SALIVARY SECRETION: ICD-10-CM

## 2021-06-15 PROCEDURE — 99214 OFFICE O/P EST MOD 30 MIN: CPT | Mod: 25

## 2021-06-15 PROCEDURE — 31575 DIAGNOSTIC LARYNGOSCOPY: CPT

## 2021-06-17 ENCOUNTER — RESULT REVIEW (OUTPATIENT)
Age: 81
End: 2021-06-17

## 2021-06-17 ENCOUNTER — APPOINTMENT (OUTPATIENT)
Dept: CT IMAGING | Facility: HOSPITAL | Age: 81
End: 2021-06-17

## 2021-06-17 ENCOUNTER — OUTPATIENT (OUTPATIENT)
Dept: OUTPATIENT SERVICES | Facility: HOSPITAL | Age: 81
LOS: 1 days | End: 2021-06-17
Payer: MEDICARE

## 2021-06-17 ENCOUNTER — APPOINTMENT (OUTPATIENT)
Dept: PULMONOLOGY | Facility: CLINIC | Age: 81
End: 2021-06-17
Payer: MEDICARE

## 2021-06-17 VITALS
HEIGHT: 72 IN | BODY MASS INDEX: 18.01 KG/M2 | WEIGHT: 133 LBS | DIASTOLIC BLOOD PRESSURE: 73 MMHG | TEMPERATURE: 97.8 F | SYSTOLIC BLOOD PRESSURE: 175 MMHG | HEART RATE: 82 BPM | OXYGEN SATURATION: 99 %

## 2021-06-17 DIAGNOSIS — Z41.9 ENCOUNTER FOR PROCEDURE FOR PURPOSES OTHER THAN REMEDYING HEALTH STATE, UNSPECIFIED: Chronic | ICD-10-CM

## 2021-06-17 DIAGNOSIS — R06.02 SHORTNESS OF BREATH: ICD-10-CM

## 2021-06-17 DIAGNOSIS — Z90.49 ACQUIRED ABSENCE OF OTHER SPECIFIED PARTS OF DIGESTIVE TRACT: Chronic | ICD-10-CM

## 2021-06-17 DIAGNOSIS — C32.3 MALIGNANT NEOPLASM OF LARYNGEAL CARTILAGE: Chronic | ICD-10-CM

## 2021-06-17 DIAGNOSIS — Z90.89 ACQUIRED ABSENCE OF OTHER ORGANS: Chronic | ICD-10-CM

## 2021-06-17 PROCEDURE — 74177 CT ABD & PELVIS W/CONTRAST: CPT

## 2021-06-17 PROCEDURE — 94010 BREATHING CAPACITY TEST: CPT | Mod: 26

## 2021-06-17 PROCEDURE — 94726 PLETHYSMOGRAPHY LUNG VOLUMES: CPT

## 2021-06-17 PROCEDURE — 74177 CT ABD & PELVIS W/CONTRAST: CPT | Mod: 26,MH

## 2021-06-17 PROCEDURE — 99215 OFFICE O/P EST HI 40 MIN: CPT

## 2021-06-17 PROCEDURE — 94760 N-INVAS EAR/PLS OXIMETRY 1: CPT

## 2021-06-17 PROCEDURE — 94060 EVALUATION OF WHEEZING: CPT

## 2021-06-17 PROCEDURE — 71260 CT THORAX DX C+: CPT | Mod: 26,MH

## 2021-06-17 PROCEDURE — 71260 CT THORAX DX C+: CPT

## 2021-06-17 PROCEDURE — 94729 DIFFUSING CAPACITY: CPT

## 2021-06-17 PROCEDURE — 94726 PLETHYSMOGRAPHY LUNG VOLUMES: CPT | Mod: 26

## 2021-06-17 PROCEDURE — 94729 DIFFUSING CAPACITY: CPT | Mod: 26

## 2021-06-17 NOTE — ASSESSMENT
[FreeTextEntry1] : Dyspnea on exertion\par \par I discussed the case in detail with the patient.  Patient had a PFT.  The patient's PFT revealed moderate restrictive lung disease with decrease in the diffusion capacity.  Compared to the previous PFT there is decrease in the lung volumes and the diffusion capacity and that could be related to the lobectomy.  Also the patient has residual pleural effusion with decrease in size which is could be contributing to his restrictive pattern.\par \par The baseline oxygen saturation is normal.  The CT scan of the chest improved compared to the previous 1 in February 2021.  The decrease in the amount of pleural effusion on the left hemothorax with no evidence of recurrence.  The patient also has abnormalities in the abdominal CT scan with ascites some mid abdominal adenopathy which are also may reflect underlying pathological disease with possibility underlying metastatic prostatic cancer which is also could be termed contributing to his dyspnea.\par \par At this time informed the patient that his pulmonary status is improving and he is encouraged to increase his exercise capacity as tolerated.  No indication for bronchodilator.  Would evaluate the patient in 3 months and he will require repeat PFT in 6 months.\par \par Squamous cell carcinoma\par \par The patient's follow-up with thoracic surgery.  I reviewed the CT scan and no evidence with the recurrence and there is decrease in the amount of left pleural effusion which is most likely related to partial reexpansion of the left upper lobe.\par \par I advised the patient to follow-up with his primary physician he will need echocardiogram

## 2021-06-17 NOTE — REVIEW OF SYSTEMS
[Fever] : no fever [Fatigue] : fatigue [Chills] : no chills [Poor Appetite] : poor appetite [Negative] : Endocrine

## 2021-06-17 NOTE — HISTORY OF PRESENT ILLNESS
[Never] : never [TextBox_4] : he is doing well but he gets tired easily.  he is trying to walk daily.  No wheeze, cough, nor leg pain

## 2021-07-15 ENCOUNTER — APPOINTMENT (OUTPATIENT)
Dept: HEMATOLOGY ONCOLOGY | Facility: CLINIC | Age: 81
End: 2021-07-15
Payer: MEDICARE

## 2021-07-15 VITALS
OXYGEN SATURATION: 97 % | SYSTOLIC BLOOD PRESSURE: 168 MMHG | RESPIRATION RATE: 18 BRPM | WEIGHT: 130 LBS | BODY MASS INDEX: 17.61 KG/M2 | HEIGHT: 72 IN | TEMPERATURE: 98.5 F | HEART RATE: 78 BPM | DIASTOLIC BLOOD PRESSURE: 87 MMHG

## 2021-07-15 DIAGNOSIS — C78.00 SECONDARY MALIGNANT NEOPLASM OF UNSPECIFIED LUNG: ICD-10-CM

## 2021-07-15 DIAGNOSIS — C34.92 MALIGNANT NEOPLASM OF UNSPECIFIED PART OF LEFT BRONCHUS OR LUNG: ICD-10-CM

## 2021-07-15 PROCEDURE — 99205 OFFICE O/P NEW HI 60 MIN: CPT

## 2021-07-15 RX ORDER — LEVOTHYROXINE SODIUM 0.17 MG/1
TABLET ORAL
Refills: 0 | Status: DISCONTINUED | COMMUNITY
End: 2021-07-15

## 2021-07-15 NOTE — HISTORY OF PRESENT ILLNESS
[de-identified] : Mr. Franklin is an 80 year old male, never smoker, with history of hypothyroidism, SCC of the L supraglottic larynx treated w chemo(weekly cisplatin given by Dr. Dowell.)/radiation in 2016 and recurred in the right soft palate, operated 1/2018 and completed RT 4/ 2018, NATALIIA SCC, s/p left upper lobectomy on 10/26/20 who presents to clinic today to discuss about recent CT scans concerning of prostate malignancy.\par \par ONC Hx: \par Mr. 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 He was monitored every 3 months in [place] by Dr. Álvarez and also in [place] by Dr. Bao Sanders. His visit to Dr. Leon in February 2016 was notable for a concerning lesion arising at the right vallecula/right lateral pharyngeal wall. This was separate from the previously treated lesion in the left AE fold that had been removed by laser. On March 2, 2016, Dr. Leon biopsied the right vallecula which showed squamous proliferative papillary lesion with high-grade dysplasia, and the left aryepiglottic fold which showed squamous cell carcinoma. Pathology review at [facility] confirmed the diagnosis of SCC in the left AE fold and squamous dysplasia with high-grade features in the right vallecula. PET/CT scan showed a maximum SUV of 9.2 in the right vallecular area as well as a 1.6 cm left cervical lymph node with maximum SUV of 9.8. FNA of the left neck node on 4/7/16 showed SCC.  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  He was found to have recurrence in the Rt soft palate, Opx 1/2018, and completed 7000 cGy to the oropharynx with Dr. Pandya from 3/12/18-4/27/18, without chemotherapy. Trouble swallowing since that time, eats a normal diet but sometimes aspirates and coughs. 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 He underwent left upper lobectomy with Dr. Mackey on 10/26/20 showing 3.5cm NATALIIA SCC, negative margins, compatible with metastatic SCC.  Observation since then.  He has been following with Dr. Snow and has been CHEY of supraglottic larynx cancer. He has been following with Dr. Briscoe for enlarged prostate and underwent MRI prostate on 6/2/21 due to recent elevated PSA. MRI Prostate showed 10x9 mm, 8x7mm right posteromedial midgland peripheral zone lesion.  CT CAP on 6/17/21 showed enlarged, lobular and heterogeneous prostate. Increase in size of sclerotic lesion right ilium. Prostate cancer should be ruled out.\par \par FH- Mother with breast cancer, Father with larynx cancer\par Last colonoscopy and endoscopy was 4-5 years ago, not remarkable as per patient. \par \par 10/26/20\par Surgical Final Report\par 1. Left level 10 lymph node:\par - One lymph node negative for tumor (0/1).\par 2. Left lower 11 lymph node:\par - One lymph node negative for tumor (0/1).\par 3. Bronchial margin:\par - Bronchial margin negative for tumor.\par 4. Left upper lobe:\par - Lung with squamous cell carcinoma, 3.5 cm. , compatible with metastatic squamous cell carcinoma ( prior history of oropharyngeal squamous cell carcinoma.\par - Parenchymal and vascular margins negative for tumor.\par - Remaining lung with a microscopic tumorlet and foci of pleural and subpleural fibrosis.\par - Nine peribronchial lymph nodes negative for tumor (0/9).\par 5.  AP window lymph node:\par - Two lymph nodes negative for tumor (0/2)\par 6. Level 8 lymph node:\par - One lymph node negative for tumor (0/1).\par 7. Subcarinal lymph node:\par - One lymph node negative for tumor (0/1).\par \par 2/12/21 CT Chest\par Interval left upper lobectomy without suspicious focal abnormality. New moderate left pleural effusion.\par \par 2/26/21 Left-sided thoracentesis\par 700 cc cc of fluid were drained\par Final Diagnosis\par LEFT PLEURAL FLUID, THORACENTESIS:\par NONDIAGNOSTIC\par The specimen is composed of blood only.\par \par 3/16/21 PET/CT\par 1.  Small left pleural effusion without increased FDG uptake.\par 2.  Post left upper lobectomy. No FDG avid pulmonary nodules.\par \par 6/2/21 MRI Prostate \par 1.10x9 mm right posteromedial midgland(extending to base) peripheral zone lesion. PI-RADS 4, high(clinically significant cancer likely). No extra prostatic extension. \par 2.8x7mm right posteromedial midgland (extending to apex) peripheral zone lesion. PI-RADS 4, high(clinically significant cancer likely). Lesion abuts capsule without extra prostatic extension. \par 3.No seminal vesical invasion or pelvic lymphadenopathy \par 4.Indeterminate right sacral lesion. Recommend bone scans for further evaluation\par 5.Partial visualization of small to moderate volume fluid in the lower abdomen, unkown etiology. Consider dedicated CT of the abdomen and pelvis for further evaluation. \par 6.Partial visualization of distal left hydroureter. This could be evaluated at time of CT abdomen and pelvis as well. \par \par 6/17/21 CT CAP\par 1. Since 3/16/2021, there has been a decrease in size of a small left pleural effusion. Left pleural thickening again noted.\par 2. New small to moderate ascites in the pelvis, of uncertain etiology.\par 3. New mild left hydronephrosis, the cause of which is not seen.\par 4. Enlarged, lobular and heterogeneous prostate. Increase in size of sclerotic lesion right ilium. Prostate cancer should be ruled out.\par 5. Bladder wall thickening. This could be due to chronic bladder outlet obstruction versus infection.\par 6. No change mild retroperitoneal lymphadenopathy.\par 7. There are two cystic lesions in the pancreas. Recommend correlation with MRI of pancreas.\par 8. No change lung nodules. Continued follow-up recommended.\par  [de-identified] : Patient presents to clinic today for initial visit to discuss about CT results concerning of prostate cancer. Reports SOB on exertion and  trouble swallowing since RT. Appetite is ok. Denies unintentional weight loss, fever, SOB, chest pain, night sweats, urinary symptoms, N/V/C/D, or abdominal pain. Overall, he feels fine.

## 2021-07-15 NOTE — REVIEW OF SYSTEMS
[Dysphagia] : dysphagia [SOB on Exertion] : shortness of breath during exertion [Fever] : no fever [Chills] : no chills [Night Sweats] : no night sweats [Fatigue] : fatigue [Recent Change In Weight] : ~T no recent weight change [Loss of Hearing] : no loss of hearing [Nosebleeds] : no nosebleeds [Hoarseness] : no hoarseness [Odynophagia] : no odynophagia [Mucosal Pain] : no mucosal pain [Shortness Of Breath] : no shortness of breath [Wheezing] : no wheezing [Cough] : no cough [Negative] : Allergic/Immunologic [FreeTextEntry4] : dry mouth

## 2021-09-29 ENCOUNTER — APPOINTMENT (OUTPATIENT)
Dept: CT IMAGING | Facility: HOSPITAL | Age: 81
End: 2021-09-29

## 2021-10-12 ENCOUNTER — OUTPATIENT (OUTPATIENT)
Dept: OUTPATIENT SERVICES | Facility: HOSPITAL | Age: 81
LOS: 1 days | End: 2021-10-12
Payer: MEDICARE

## 2021-10-12 ENCOUNTER — RESULT REVIEW (OUTPATIENT)
Age: 81
End: 2021-10-12

## 2021-10-12 ENCOUNTER — APPOINTMENT (OUTPATIENT)
Dept: CT IMAGING | Facility: HOSPITAL | Age: 81
End: 2021-10-12

## 2021-10-12 DIAGNOSIS — Z41.9 ENCOUNTER FOR PROCEDURE FOR PURPOSES OTHER THAN REMEDYING HEALTH STATE, UNSPECIFIED: Chronic | ICD-10-CM

## 2021-10-12 DIAGNOSIS — Z90.49 ACQUIRED ABSENCE OF OTHER SPECIFIED PARTS OF DIGESTIVE TRACT: Chronic | ICD-10-CM

## 2021-10-12 DIAGNOSIS — Z90.89 ACQUIRED ABSENCE OF OTHER ORGANS: Chronic | ICD-10-CM

## 2021-10-12 DIAGNOSIS — C32.3 MALIGNANT NEOPLASM OF LARYNGEAL CARTILAGE: Chronic | ICD-10-CM

## 2021-10-12 PROCEDURE — 74177 CT ABD & PELVIS W/CONTRAST: CPT | Mod: MG

## 2021-10-12 PROCEDURE — A9503: CPT

## 2021-10-12 PROCEDURE — 71260 CT THORAX DX C+: CPT | Mod: ME

## 2021-10-12 PROCEDURE — 78306 BONE IMAGING WHOLE BODY: CPT | Mod: MH

## 2021-10-12 PROCEDURE — G1004: CPT

## 2021-10-14 ENCOUNTER — APPOINTMENT (OUTPATIENT)
Dept: HEMATOLOGY ONCOLOGY | Facility: CLINIC | Age: 81
End: 2021-10-14
Payer: MEDICARE

## 2021-10-14 ENCOUNTER — LABORATORY RESULT (OUTPATIENT)
Age: 81
End: 2021-10-14

## 2021-10-14 ENCOUNTER — APPOINTMENT (OUTPATIENT)
Dept: RADIATION ONCOLOGY | Facility: CLINIC | Age: 81
End: 2021-10-14
Payer: MEDICARE

## 2021-10-14 VITALS
HEIGHT: 72 IN | TEMPERATURE: 98.2 F | HEART RATE: 83 BPM | WEIGHT: 131 LBS | OXYGEN SATURATION: 97 % | DIASTOLIC BLOOD PRESSURE: 82 MMHG | SYSTOLIC BLOOD PRESSURE: 151 MMHG | BODY MASS INDEX: 17.74 KG/M2 | RESPIRATION RATE: 18 BRPM

## 2021-10-14 VITALS
HEIGHT: 72 IN | WEIGHT: 131 LBS | DIASTOLIC BLOOD PRESSURE: 83 MMHG | HEART RATE: 85 BPM | SYSTOLIC BLOOD PRESSURE: 157 MMHG | OXYGEN SATURATION: 99 % | TEMPERATURE: 98 F | BODY MASS INDEX: 17.74 KG/M2

## 2021-10-14 DIAGNOSIS — C34.90 MALIGNANT NEOPLASM OF UNSPECIFIED PART OF UNSPECIFIED BRONCHUS OR LUNG: ICD-10-CM

## 2021-10-14 DIAGNOSIS — K86.89 OTHER SPECIFIED DISEASES OF PANCREAS: ICD-10-CM

## 2021-10-14 DIAGNOSIS — J98.11 ATELECTASIS: ICD-10-CM

## 2021-10-14 DIAGNOSIS — C05.1 MALIGNANT NEOPLASM OF SOFT PALATE: ICD-10-CM

## 2021-10-14 DIAGNOSIS — C61 MALIGNANT NEOPLASM OF PROSTATE: ICD-10-CM

## 2021-10-14 DIAGNOSIS — N28.9 DISORDER OF KIDNEY AND URETER, UNSPECIFIED: ICD-10-CM

## 2021-10-14 PROCEDURE — 99214 OFFICE O/P EST MOD 30 MIN: CPT

## 2021-10-14 PROCEDURE — 99215 OFFICE O/P EST HI 40 MIN: CPT

## 2021-10-19 ENCOUNTER — APPOINTMENT (OUTPATIENT)
Dept: UROLOGY | Facility: CLINIC | Age: 81
End: 2021-10-19
Payer: MEDICARE

## 2021-10-19 ENCOUNTER — OUTPATIENT (OUTPATIENT)
Dept: OUTPATIENT SERVICES | Facility: HOSPITAL | Age: 81
LOS: 1 days | End: 2021-10-19
Payer: MEDICARE

## 2021-10-19 ENCOUNTER — APPOINTMENT (OUTPATIENT)
Age: 81
End: 2021-10-19

## 2021-10-19 VITALS
TEMPERATURE: 97 F | RESPIRATION RATE: 17 BRPM | SYSTOLIC BLOOD PRESSURE: 146 MMHG | DIASTOLIC BLOOD PRESSURE: 67 MMHG | HEART RATE: 81 BPM | OXYGEN SATURATION: 98 %

## 2021-10-19 VITALS — DIASTOLIC BLOOD PRESSURE: 77 MMHG | TEMPERATURE: 98.3 F | SYSTOLIC BLOOD PRESSURE: 141 MMHG

## 2021-10-19 DIAGNOSIS — Z90.49 ACQUIRED ABSENCE OF OTHER SPECIFIED PARTS OF DIGESTIVE TRACT: Chronic | ICD-10-CM

## 2021-10-19 DIAGNOSIS — Z41.9 ENCOUNTER FOR PROCEDURE FOR PURPOSES OTHER THAN REMEDYING HEALTH STATE, UNSPECIFIED: Chronic | ICD-10-CM

## 2021-10-19 DIAGNOSIS — C32.3 MALIGNANT NEOPLASM OF LARYNGEAL CARTILAGE: Chronic | ICD-10-CM

## 2021-10-19 DIAGNOSIS — Z90.89 ACQUIRED ABSENCE OF OTHER ORGANS: Chronic | ICD-10-CM

## 2021-10-19 PROBLEM — C05.1: Status: ACTIVE | Noted: 2017-12-21

## 2021-10-19 PROCEDURE — 96402 CHEMO HORMON ANTINEOPL SQ/IM: CPT

## 2021-10-19 PROCEDURE — 99204 OFFICE O/P NEW MOD 45 MIN: CPT

## 2021-10-19 RX ADMIN — Medication 22.5 MILLIGRAM(S): at 16:30

## 2021-10-19 NOTE — HISTORY OF PRESENT ILLNESS
[de-identified] : Mr. Franklin is an 80 year old male, never smoker, with history of hypothyroidism, SCC of the L supraglottic larynx treated w chemo(weekly cisplatin given by Dr. Dowell.)/radiation in 2016 and recurred in the right soft palate, operated 1/2018 and completed RT 4/ 2018, NATALIIA SCC, s/p left upper lobectomy on 10/26/20 who presents to clinic today to discuss about recent CT scans concerning of prostate malignancy.\par \par ONC Hx: \par Mr. 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 He was monitored every 3 months in [place] by Dr. Álvarez and also in [place] by Dr. Bao Sanders. His visit to Dr. Leon in February 2016 was notable for a concerning lesion arising at the right vallecula/right lateral pharyngeal wall. This was separate from the previously treated lesion in the left AE fold that had been removed by laser. On March 2, 2016, Dr. Leon biopsied the right vallecula which showed squamous proliferative papillary lesion with high-grade dysplasia, and the left aryepiglottic fold which showed squamous cell carcinoma. Pathology review at [facility] confirmed the diagnosis of SCC in the left AE fold and squamous dysplasia with high-grade features in the right vallecula. PET/CT scan showed a maximum SUV of 9.2 in the right vallecular area as well as a 1.6 cm left cervical lymph node with maximum SUV of 9.8. FNA of the left neck node on 4/7/16 showed SCC.  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  He was found to have recurrence in the Rt soft palate, Opx 1/2018, and completed 7000 cGy to the oropharynx with Dr. Pandya from 3/12/18-4/27/18, without chemotherapy. Trouble swallowing since that time, eats a normal diet but sometimes aspirates and coughs. 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 He underwent left upper lobectomy with Dr. Mackey on 10/26/20 showing 3.5cm NATALIIA SCC, negative margins, compatible with metastatic SCC.  Observation since then.  He has been following with Dr. Snow and has been CHEY of supraglottic larynx cancer. He has been following with Dr. Briscoe for enlarged prostate and underwent MRI prostate on 6/2/21 due to recent elevated PSA. MRI Prostate showed 10x9 mm, 8x7mm right posteromedial midgland peripheral zone lesion.  CT CAP on 6/17/21 showed enlarged, lobular and heterogeneous prostate. Increase in size of sclerotic lesion right ilium. Prostate cancer should be ruled out.\par \par FH- Mother with breast cancer, Father with larynx cancer\par Last colonoscopy and endoscopy was 4-5 years ago, not remarkable as per patient. \par \par 10/26/20\par Surgical Final Report\par 1. Left level 10 lymph node:\par - One lymph node negative for tumor (0/1).\par 2. Left lower 11 lymph node:\par - One lymph node negative for tumor (0/1).\par 3. Bronchial margin:\par - Bronchial margin negative for tumor.\par 4. Left upper lobe:\par - Lung with squamous cell carcinoma, 3.5 cm. , compatible with metastatic squamous cell carcinoma ( prior history of oropharyngeal squamous cell carcinoma.\par - Parenchymal and vascular margins negative for tumor.\par - Remaining lung with a microscopic tumorlet and foci of pleural and subpleural fibrosis.\par - Nine peribronchial lymph nodes negative for tumor (0/9).\par 5.  AP window lymph node:\par - Two lymph nodes negative for tumor (0/2)\par 6. Level 8 lymph node:\par - One lymph node negative for tumor (0/1).\par 7. Subcarinal lymph node:\par - One lymph node negative for tumor (0/1).\par \par 2/12/21 CT Chest\par Interval left upper lobectomy without suspicious focal abnormality. New moderate left pleural effusion.\par \par 2/26/21 Left-sided thoracentesis\par 700 cc cc of fluid were drained\par Final Diagnosis\par LEFT PLEURAL FLUID, THORACENTESIS:\par NONDIAGNOSTIC\par The specimen is composed of blood only.\par \par 3/16/21 PET/CT\par 1.  Small left pleural effusion without increased FDG uptake.\par 2.  Post left upper lobectomy. No FDG avid pulmonary nodules.\par \par 6/2/21 MRI Prostate \par 1.10x9 mm right posteromedial midgland(extending to base) peripheral zone lesion. PI-RADS 4, high(clinically significant cancer likely). No extra prostatic extension. \par 2.8x7mm right posteromedial midgland (extending to apex) peripheral zone lesion. PI-RADS 4, high(clinically significant cancer likely). Lesion abuts capsule without extra prostatic extension. \par 3.No seminal vesical invasion or pelvic lymphadenopathy \par 4.Indeterminate right sacral lesion. Recommend bone scans for further evaluation\par 5.Partial visualization of small to moderate volume fluid in the lower abdomen, unkown etiology. Consider dedicated CT of the abdomen and pelvis for further evaluation. \par 6.Partial visualization of distal left hydroureter. This could be evaluated at time of CT abdomen and pelvis as well. \par \par 6/17/21 CT CAP\par 1. Since 3/16/2021, there has been a decrease in size of a small left pleural effusion. Left pleural thickening again noted.\par 2. New small to moderate ascites in the pelvis, of uncertain etiology.\par 3. New mild left hydronephrosis, the cause of which is not seen.\par 4. Enlarged, lobular and heterogeneous prostate. Increase in size of sclerotic lesion right ilium. Prostate cancer should be ruled out.\par 5. Bladder wall thickening. This could be due to chronic bladder outlet obstruction versus infection.\par 6. No change mild retroperitoneal lymphadenopathy.\par 7. There are two cystic lesions in the pancreas. Recommend correlation with MRI of pancreas.\par 8. No change lung nodules. Continued follow-up recommended.\par  [de-identified] : Patient is being w/u for new [place] 9 localized prostate CA (4+4 in 1 of 12 cores and 3+ 3 in 1 of 12 cores) T1c.   Bone scan negative for distant mets.  No symptoms per patient and otherwise he's doing well.  Apparently the patients PSA had been rising (11.6 in 4/21) after which his MRI demonstrated bilateral PI RADS 4 lesions and questionable enhancement of sacroiliac region.  Previously refused biopsy.

## 2021-10-19 NOTE — HISTORY OF PRESENT ILLNESS
[FreeTextEntry1] : Mr. Fer Franklin is well known to us from 2 prior courses of treatment:\par 1. 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 \par 2. 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  He underwent left upper lobectomy on 10/26/20 showing 3.5cm NATALIIA SCC, negative margins, compatible with metastatic SCC.\par \par 10/14/21 - Follow-up\par Mr. Franklin returns for routine follow-up.  He was last seen on 4/29/21.  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  He was referred to Dr. Grant in July.  Plan was for follow-up after his prostate biopsy and updated CT CAP.  He last saw Dr. Wilson on 6/15/21, plan is for 6-month follow-up.  He last saw Dr. Fontenot on 6/3/21.  He saw his PCP recently - was started on iron for mild anemia.  Today, he reports feeling stable.  He notes ongoing fatigue, stable dysphagia.  Sees dental regularly and is doing his H&N exercises.\par \par CT CAP 10/12/21\par - Overall stable evaluation with no significant change since 6/17/2021.\par - Status post left upper lobectomy. No evidence of local recurrence. Unchanged small left pleural effusion\par - Enlarged prostate and mildly thickened bladder wall as seen on prior study.\par - 2 cystic pancreatic lesions have been stable.\par - Persistent small ascites\par \par NM bone scan 10/12/21 - No scintigraphic evidence of osseous metastasis.\par \par CT CAP 6/17/21\par 1. Since 3/16/2021, there has been a decrease in size of a small left pleural effusion. Left pleural thickening again noted.\par 2. New small to moderate ascites in the pelvis, of uncertain etiology.\par 3. New mild left hydronephrosis, the cause of which is not seen.\par 4. Enlarged, lobular and heterogeneous prostate. Increase in size of sclerotic lesion right ilium. Prostate cancer should be ruled out.\par 5. Bladder wall thickening. This could be due to chronic bladder outlet obstruction versus infection.\par 6. No change mild retroperitoneal lymphadenopathy.\par 7. There are two cystic lesions in the pancreas. Recommend correlation with MRI of pancreas.\par 8. No change lung nodules. Continued follow-up recommended.\par \par MRI prostate 5/2/21 ([facility])\par - 10 x 9 mm right posteromedial midgland (extending to base) peripheral zone lesion.  PI-RADS 4, high (clinically significant cancer likely).  No extra prostatic extension.\par - 8 x 7 mm right posteromedial midgland (extending to apex) peripheral zone lesion.  PI-RADS 4, high (clinically significant cancer likely).  Lesion abuts capsule without extra prostatic extension. \par - No seminal vesicle invasion or pelvic lymphadenopathy.\par - Indeterminate right sacral lesion.  Recommend bone scan for further evaluation.  \par - Partial visualization of small to moderate volume fluid in the lower abdomen, unknown etiology.  Consider dedicated CT abdomen and pelvis for further evaluation.\par - Partial visualization of distal left hydroureter, clinical correlation recommended.  This could be evaluated at the time of CT AP as well. \par \par \par 4/28/21 Follow up:\par He was seen today for follow up. Reports he has difficulty swallowing and he takes small bites to swallow. He also reports SOB on exertion and walking with his N95 mask. He reports being vaccinated with Moderna, last dose 3/18/21 for COVID-19. He followed up with PCP Cayla had blood work and a sonogram. He will follow up with Dr. Wilson in June. He was seen by a dentist last month. Otherwise reports he is in good health. He had a CT Chest 2/11/21 and PET CT 3/16/21 since I saw him last visit. \par \par CT Chest 2/11/21: Notable for Left pleural fluid thoracentesis on 2/26/21 - non diagnostic. \par PET/CT 3/16/21 - no evidence of recurrent or metastatic disease. small left pleural effusion. \par \par Dr. David Briscoe - urologist - pt scheduled for MRI of the prostate soon for elevated PSA. \par Dr. Cayla Hendrix - PCP - managing his thyroid medicine, BP medicine. \par Dentist: Seen last month\par Dr. Edy PRABHAKAR- he is scheduled to be seen in June\par \par \par 9/24/20 Follow up \par PET/CT 9/21/20\par - FDG avid left lingular mass corresponding to patient's known malignancy.\par - Focal FDG uptake adjacent to the right maxillary first and third molars which may represent periodontal disease, posttreatment changes or neoplasm and correlation with physical examination is recommended.\par - No additional sites of abnormal FDG uptake to suggest biologic tumor activity.\par \par MRI brain 9/21/20\par - No evidence of intracranial metastatic disease.\par \par He is scheduled to see Dr. Lopez this morning.  Today, he reports feeling well and is without complaints. He is eager to learn more about treatment options for left lung cancer with radiation as well as surgery. \par \par 9/16/20 - Follow-up\par Mr. Franklin returns for follow-up.  He was last seen on 7/30/20.  At that time, head and neck exam showed CHEY, but recent CT chest showed an enlarging left lingula mass/consolidation.  Plan was for referral to pulmonary Dr. Jaquez for further work-up of CT findings, continue head and neck exercises, resume lymphedema therapy, SLP follow-up for occasional coughing/gagging with eating, continue routine dental f/u and PreviDent, PET/CT in 8 months.  He underwent bronchoscopy with biopsies on 9/8/20.  Pathology as follows: \par \par Lung, left lingula, biopsy and touch prep \par - Nonkeratinizing squamous cell carcinoma, see note\par - Note: Immunostain workup reveals strong positive staining for p40. The tumor cells are negative for TTF1-naspsin (duel stain).\par Staining with p16 is is patchy. Strong, diffuse staining with p16 is not observed.\par - The patient is noted to have history of squamous cell carcinoma of the oropharynx (per the 2014 report, the carcinoma was negative for HPV RODRIGUEZ 16/11, 16/18, and 31/33. p16 staining was not performed).  This current lesion may represent a primary lung squamous cell carcinoma or possible metastasis from the oropharynx. Clinical correlation advised.\par LN 11L, FNA - negative for malignant cells\par LN 4L, FNA- nondiagnostic \par LN 7, FNA - nondiagnostic\par LN 11 R, FNA - negative for malignant cells\par BAL left lingular - negative for malignant cells\par Lung, left lingula, FNA - negative for malignant cells \par \par Today, he reports feeling stable since his last visit, "nothing has changed."  He continues to have dysphagia with dry foods and occasional cough when swallowing.  He has not followed up with SLP.  He did not resume lymphedema therapy because "I just didn't do it."  He is doing head and neck exercises on his own.  He saw dental 6 weeks ago.  He is not using PreviDent.  He had a basal cell carcinoma removed from his left ear a few weeks ago.  He denies pain, odynophagia, dyspnea, cough.  \par \par 7/30/20- Follow-up\par Mr. Franklin returns for routine follow-up.  He had a telephone follow-up on 4/28/20.  Plan was for CT and in-office exam between June and August post acute phase of COVID, f/u with Dr. Wilson, dental and PMD.  \par \par CT neck with contrast done 7/14/2020:\par There remains no evidence of local or regional radha recurrence of oropharyngeal and laryngeal carcinomas, though assessment of the palate and oral cavity is limited by dental artifact. Is likely that PET/CT will be more accurate in the detection of small volume disease recurrence.\par \par CT chest with contrast done 7/14/2020: \par -Mass/consolidation seen in the superior lingula has increased in size as compared to the CT chest of 4/18/2017 and PET CT 10/25/2018. Findings can be correlated with bronchoscopy for tissue sampling. Differential diagnosis would include organizing pneumonia.\par -Other lung nodules are unchanged.\par -No adenopathy.\par \par He last saw Dr. Wilson on 6/3/20; plan is for f/u in 5-6 months.  He last saw dental a month ago.  He has not seen PCP recently (last visit was in February 2020).  Today, he notes he is stable in terms of dysphagia when eating dry foods. Also notes mild xerostomia at times, but it is not bothersome. Of note, he chipped a tooth yesterday. He also reports he had excision of basal cell carcinoma on left external ear on Monday. He saw dermatologist Dr. Saeed today for follow up and will see her again next week. He reports he does H&N exercises "fairly regularly." He denies any further complaints to include fever, chills, cough, CP, SOB. \par \par \par 4/28/20 - Follow-up by Telephone due to COVID-19 (Patient has no video capability)\par Mr. Franklin is being seen via telehealth for routine follow-up.  He was last seen on 11/14/19.  Plan was for CT neck and chest in April, continue follow-up with dental, PMD, and Dr. Álvaerz, continue PreviDent, head and neck exercises, alcohol reduction.  \par He was seen by his ENT in [place] in February who examined him and said he was cancer-free. He was given a report to send to us. \par Today, he feels stable. Throat is not getting worse. He also recently cancelled an in-person appt with Dr. Wilson. He continues to do his H&N exercises. He last saw Namrata Vicente OT in November.\par \par 11/14/19 - Follow-up \par Mr. Franklin returns today for routine follow-up.  He was last seen on 5/2/19.  Plan was for xerostomia strategies, head and neck exercises, OT for lymphedema therapy, continue f/u with dental and PMD, f/u SLP for any worsening symptoms, f/u with GI for endoscopies PRN.  He was also advised on alcohol reduction and routine health maintenance.  He last saw dental in July, will follow-up again next week.  He last saw his PMD one week ago.  Today, he reports feeling well.  He notes stable dysphagia and odynophagia with dry foods and stable xerostomia.  He denies other complaints to include pain, dyspnea, chest pain and palpitations.  He does see Namrata Vicente OT for lymphedema therapy.  He denies smoking.  Drinks a glass of wine with dinner most nights.  \par Will be traveling to [place] for winter.\par \par 5/2/19 - Follow-up\par Mr. Franklin returns today for routine follow-up.  He was last seen here on 11/5/18.  Plan at that time was for repeat CT neck in 4 months, f/u SLP, f/u OT for lymphedema treatment, f/u with dental.  Self-care measures (xerostomia management, head and neck exercises) also reviewed at that time.  He spent most of the winter in [place] and is now back in town. \par \par CT neck with contrast 4/23/19 - Impression: No evidence of local or regional radha recurrence of squamous cell carcinoma.  \par \par He last saw SLP in October 2018. He last saw dental in April.  He last saw Dr. Wilson on 4/9/19.  He reports having been started on synthroid by his PMD approximately 6 months ago.  He is scheduled to follow-up with PMD in 1 week.  Today, he reports feeling well.  He continues to have xerostomia.  He is using baking soda rinses with little relief.  He also reports dysphagia and odynophagia that he relates to xerostomia, worst with dry foods.  He states he is eating 3 full meals per day, but tries to eat soft foods that are easier to swallow.  He states he has stopped doing head and neck exercises. He drinks 1-2 glasses of wine most nights with dinner. He feels his GI complaints (dry food getting stuck in lower neck) are stable and he does not need GI follow up. Has used PPI in past with no relief. Has a GI doc locally who has done endoscopies 3 years ago. \par \par \par 11/5/18-Follow up\par At his last visit he complained of epigastric burning and dysphagia. \par PET/CT showed some streak uptake in soft palate with plan to repeat  PET/CT in 3 months.  He f/u with SLP 10/16/18 for dysphagia therapy. He also followed up with Dr. Wilson 9/18/18 and complained of lower chest discomfort upon eating. Barium swallow showed no evidence of esophageal dysmotility, no mass, stricture or other esophageal abnormality was seen.\par \par PET/CT 10/25/18\par IMPRESSION: \par 1. Only mild residual activity along the left side the hard palate, unchanged since 7/25/2018. Faint activity previously seen along the right oropharyngeal wall is no longer present. No regional or distal hypermetabolic lymphadenopathy. \par 2. Continued low-level activity within an area of atelectasis versus scarring in the left upper lobe lingula (maximum SUV 2.8), relatively unchanged since 7/25/2018 however moderately improved/reduced since 3/24/2016. \par \par He was seen by Sang Arellano on 10/16/18: "patient underwent a repeat modified barium swallow study on 9/18/18.  Exam showed a mild pharyngeal dysphagia with penetration on cup sips of thin liquid with trace silent aspiration on initial trial, as well as mild pharyngeal residue after the swallow.  Patient recommended to continue on mechanical soft solid diet with thin liquids with periodic throat clearing to reduce risk of aspiration and continuation of dysphagia therapy."\par \par Today he states that he feels generally well. He states that he continues to have intermittent epigastric pain with swallowing various foods. He eats mainly soft textured foods. He notes dry mouth for which he uses bicarbonate rinses . He does H&N exercises. He has followed up with his PMD and has been seeing his dentist regularly. He does not want to use a PPI or see GI for further workup of his epigastric discomfort.\par \par \par 8/2/18\par Mr. Fer Franklin presents today for routine follow up and to review PET/CT results. 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 He was found to have recurrence in the Rt soft palate, Opx 1/2018, and completed 7000 cGy to the oropharynx from 3/12/18-4/27/18 for treatment of recurrent early stage oral SCC.\par \par He last saw us 7/10/18 and was CHEY on exam. He was advised to resume PPI for GERD symptoms, discontinue gabapentin and continue dental care and rinses for dry mouth\par \par PET scan on 7/25/18 showed improvement in right oropharyngeal and hard palate abnormality. There were small residual foci of increased metabolic activity remaining in the right lateral oropharynx and on the left side of the hard palate. There was a decrease in the size of a left lung nodule associated with bronchiectasis and scarring, probably representing post inflammatory change. No change in a few sclerotic bone lesions. \par \par Today he feels generally well. He notes burning epigastric pain with swallowing 3/10. He has f/u with SLP for dysphagia with recommendation to continue H&N/swallowing exercises with plan to do modified barium swallow in no improvement at next f/u in 4-6 weeks. he has also f/u with Dr. Wilson and was found to be doing well.\par \par \par ONCOLOGY HISTORY\par Mr. 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 He now returns with a NEW biopsy proven SCC of the Right soft palate, hard palate, and RMT that is outside of prior radiation field.\par \par He is s/p concurrent radiation therapy with chemotherapy completed 6/23/16 with curative intent. Treatment was directed at the supraglottic larynx, vallecula/tongue base, and bilateral necks. Chemotherapy was weekly cisplatin given by Dr. Dowell.\par \par Mr. 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 Wilson. Pathology revealed infiltrating squamous cell carcinoma, depth of invasion 2mm, arising in ulcerated inflamed mucosa with high grade dysplasia.  The inferior margin showed squamous mucosa, positive for high grade dysplasia. Anterior margin showed focally disrupted squamous mucosa negative for dysplasia, and dislodged fragment of high grade dysplasia. Second right oropharynx showed at least high grade dysplasia, suspicious for superficial invasion, and the tumor of the right soft palate showed high grade dysplasia, with brisk underlying lymphoplasmacytic infiltrate. \par \par Mr. Franklin presents today for further consideration for radiation therapy. He still has significant throat pain after surgery. He is not eating and drinking well because of this. He is eating mostly papaya, and notes he has lost 10 pounds since surgery as judged by his home scale. He has a slight cough. His energy levels are significantly decreased, and he has felt like he has a fever on and off. He is not taking tylenol despite significant pain at the surgical site.\par \par He continues with head and neck exercises, and sees a dentist every 4 months. He is not using flouride toothpaste. \par He has dry mouth as well. \par \par TREATMENT SUMMARY: \par Treatment Site: Larynx, Oropharynx, Necks \par Total dose 7,000cGy / 35 fractions with dose-painting\par Treatment Dates: 5/05/2016 to 6/23/2016\par \par He did not have any unexpected treatment complications during the course of treatment.  He tolerated treatment exceptionally well and did not require narcotic pain medications. He maintained an adequate weight and did not have any breaks in radiation or chemotherapy. \par \par ONCOLOGIC HISTORY: \par Mr. 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 He was monitored every 3 months in [place] by Dr. Álvarez and also in [place] by Dr. Bao Sanders. His visit to Dr. Leon in February 2016 was notable for a concerning lesion arising at the right vallecula/right lateral pharyngeal wall. This was separate from the previously treated lesion in the left AE fold that had been removed by laser. On March 2, 2016, Dr. Leon biopsied the right vallecula which showed squamous proliferative papillary lesion with high-grade dysplasia, and the left aryepiglottic fold which showed squamous cell carcinoma. Pathology review at [facility] confirmed the diagnosis of SCC in the left AE fold and squamous dysplasia with high-grade features in the right vallecula. PET/CT scan showed a maximum SUV of 9.2 in the right vallecular area as well as a 1.6 cm left cervical lymph node with maximum SUV of 9.8. FNA of the left neck node on 4/7/16 showed SCC.  \par \par 1/29/18- Mr. 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 He now returns with a NEW biopsy proven SCC of the Right soft palate, hard palate, and RMT that is outside of prior radiation field.\par He is s/p concurrent radiation therapy with chemotherapy completed 6/23/16 with curative intent. Treatment was directed at the supraglottic larynx, vallecula/tongue base, and bilateral necks. Chemotherapy was weekly cisplatin given by Dr. Dowell.\par \par Mr. Franklin underwent biopsy of Rt. retromolar trigone erythroleukoplakia on 12/15/17, which revealed severe dysplaia with focal ulceration, extending up to biopsy margins.\par On 1/23/18, the patient underwent biopsy of right soft palate, right oropharynx, and right piriform sinus with Dr. Wilson. Pathology revealed infiltrating squamous cell carcinoma, depth of invasion 2mm, arising in ulcerated inflamed mucosa with high grade dysplasia.  The inferior margin showed squamous mucosa, positive for high grade dysplasia. Anterior margin showed focally disrupted squamous mucosa negative for dysplasia, and dislodged fragment of high grade dysplasia. Second right oropharynx showed at least high grade dysplasia, suspicious for superficial invasion, and the tumor of the right soft palate showed high grade dysplasia, with brisk underlying lymphoplasmacytic infiltrate. \par \par At the time of initial re-consult for radiation therapy Mr. Franklin still had significant throat pain after surgery.\par \par 5/22/18- Mr. Fre Franklin presents today for post treatment evaluation.\par His weight is up two pounds from his last week of treatment today. Appetite is good, however he needs to make a conscious effort to eat and swallow. Most of his mouth has healed well, however he feels he still has pain to his right lower mouth. \par He has trouble swallowing dry things, and needs to sip extra water when he eats them. Taste sensation intact. Not using prevident because it burns too much. DOing head and neck exercises, and planning to make an appointment with [facility] speech and swallow. Energy is good. Thick saliva is improving. \par \par 7/10/18- Mr. Franklin feels well. Notes some discomfort with swallowing. Started thyroid medication, following with speech and swallow. Able to swallow liquids, trouble with dry foods.

## 2021-10-19 NOTE — HISTORY OF PRESENT ILLNESS
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 Briscoe for enlarged prostate and underwent MRI prostate on 6/2/21 due to recent elevated PSA ( 11.6). MRI Prostate showed 10x9 mm, 8x7mm right posteromedial midgland peripheral zone lesion. CT CAP on 6/17/21 showed enlarged, lobular and heterogeneous prostate. Increase in size of sclerotic lesion right ilium.\par \par Prostate Biopsy was done by Dr. Terry Obregon at [facility] on 9/21/21\par  4+4  in 1/12 cores \par 3+3 in 1/12 cores\par 4+5 in 2/5 cores \par No problems after biopsy \par \par Most recent PSA was 15.40 ng/mL on 10/14/21\par .0\par \par 6/2/21 MRI Prostate \par 1.10x9 mm right posteromedial midgland(extending to base) peripheral zone lesion. PI-RADS 4, high(clinically significant cancer likely). No extra prostatic extension. \par 2.8x7mm right posteromedial midgland (extending to apex) peripheral zone lesion. PI-RADS 4, high(clinically significant cancer likely). Lesion abuts capsule without extra prostatic extension. \par 3.No seminal vesical invasion or pelvic lymphadenopathy \par 4.Indeterminate right sacral lesion. Recommend bone scans for further evaluation\par 5.Partial visualization of small to moderate volume fluid in the lower abdomen, unknown etiology. Consider dedicated CT of the abdomen and pelvis for further evaluation. \par 6.Partial visualization of distal left hydroureter. This could be evaluated at time of CT abdomen and pelvis as well. \par  \par \par 6/17/21 CT CAP\par 1. Since 3/16/2021, there has been a decrease in size of a small left pleural effusion. Left pleural thickening again noted.\par 2. New small to moderate ascites in the pelvis, of uncertain etiology.\par 3. New mild left hydronephrosis, the cause of which is not seen.\par 4. Enlarged, lobular and heterogeneous prostate. Increase in size of sclerotic lesion right ilium. Prostate cancer should be ruled out.\par 5. Bladder wall thickening. This could be due to chronic bladder outlet obstruction versus infection.\par 6. No change mild retroperitoneal lymphadenopathy.\par 7. There are two cystic lesions in the pancreas. Recommend correlation with MRI of pancreas.\par 8. No change lung nodules. Continued follow-up recommended.\par  \par 10/12/21 CT Ab/Pelvis\par Slight fullness of left renal pelvis without overt hydronephrosis. Overall stable evaluation with no significant change since 6/17/21.\par \par 10/12/21 Bone scan\par No Scintigraphic evidence of osseous metastasis\par \par \par Reports some fatigue since lung sx\par Many years of nocturia 3-4/night, occasional urgency and feeling of incomplete emptying was told by Dr. Briscoe everything was ok.\par \par Just started Bicalutamide 3-4 days ago \par Hormone injection given before his visit today \par \par \par \par \par PMH:\par PSH:lobectomy, radical neck dissection \par FAMHX: mother with breast cancer, father with larynx cancer\par SOCIAL:nonsmoker, retired from banking \par ROS:\par

## 2021-10-19 NOTE — REVIEW OF SYSTEMS
[Fatigue] : fatigue [Dysphagia] : dysphagia [SOB on Exertion] : shortness of breath during exertion [Negative] : Allergic/Immunologic [Fever] : no fever [Chills] : no chills [Night Sweats] : no night sweats [Recent Change In Weight] : ~T no recent weight change [Loss of Hearing] : no loss of hearing [Nosebleeds] : no nosebleeds [Hoarseness] : no hoarseness [Odynophagia] : no odynophagia [Mucosal Pain] : no mucosal pain [Shortness Of Breath] : no shortness of breath [Wheezing] : no wheezing [Cough] : no cough [FreeTextEntry4] : dry mouth

## 2021-10-19 NOTE — PHYSICAL EXAM
[Thin] : thin [Sclera] : the sclera and conjunctiva were normal [Extraocular Movements] : extraocular movements were intact [Hearing Threshold Finger Rub Not [place]] : hearing was normal [Outer Ear] : the ears and nose were normal in appearance [Respiration, Rhythm And Depth] : normal respiratory rhythm and effort [Exaggerated Use Of Accessory Muscles For Inspiration] : no accessory muscle use [Auscultation Breath Sounds / Voice Sounds] : lungs were clear to auscultation bilaterally [Heart Rate And Rhythm] : heart rate and rhythm were normal [Heart Sounds] : normal S1 and S2 [Arterial Pulses Normal] : the arterial pulses were normal [Edema] : no peripheral edema present [Normal] : oriented to person, place and time, the affect was normal, the mood was normal and not anxious [Oriented To Time, Place, And Person] : oriented to person, place, and time [Affect] : the affect was normal [Mood] : the mood was normal [] : no respiratory distress [de-identified] : Fair dentition throughout. scar tissue of right soft palate and tonsil c/w prior treatment change  [de-identified] : Left neck fibrosis > Right  [de-identified] : left shoulder depressed compared with right

## 2021-10-19 NOTE — DATA REVIEWED
[FreeTextEntry1] : I have personally reviewed all relevant imaging studies and I have discussed the case with the referring physician.\par

## 2021-10-19 NOTE — PHYSICAL EXAM
[General Appearance - Well Developed] : well developed [General Appearance - Well Nourished] : well nourished [Normal Appearance] : normal appearance [Well Groomed] : well groomed [General Appearance - In No Acute Distress] : no acute distress [Edema] : no peripheral edema [] : no respiratory distress [Respiration, Rhythm And Depth] : normal respiratory rhythm and effort [Exaggerated Use Of Accessory Muscles For Inspiration] : no accessory muscle use [Normal Station and Gait] : the gait and station were normal for the patient's age [No Focal Deficits] : no focal deficits [Affect] : the affect was normal [Oriented To Time, Place, And Person] : oriented to person, place, and time [Mood] : the mood was normal [Not Anxious] : not anxious [FreeTextEntry1] : nodular at right apex

## 2021-10-19 NOTE — ASSESSMENT
[FreeTextEntry1] : 81 year old male with [place] 9 prostate cancer currently on Bicalutamide \par - nodular at right apex noted on JASWINDER\par -Hormone shot given today \par -reviewed pathology report from biopsy.  He is high risk and would recommend radiation as treatment for curative intent of prostate cancer.\par \par Follow up with Dr. Gardner for consult

## 2021-10-20 DIAGNOSIS — C10.9 MALIGNANT NEOPLASM OF OROPHARYNX, UNSPECIFIED: ICD-10-CM

## 2021-10-21 ENCOUNTER — APPOINTMENT (OUTPATIENT)
Dept: HEART AND VASCULAR | Facility: CLINIC | Age: 81
End: 2021-10-21
Payer: MEDICARE

## 2021-10-21 ENCOUNTER — NON-APPOINTMENT (OUTPATIENT)
Age: 81
End: 2021-10-21

## 2021-10-21 VITALS
SYSTOLIC BLOOD PRESSURE: 160 MMHG | WEIGHT: 132 LBS | DIASTOLIC BLOOD PRESSURE: 70 MMHG | HEIGHT: 72 IN | HEART RATE: 72 BPM | BODY MASS INDEX: 17.88 KG/M2

## 2021-10-21 VITALS — SYSTOLIC BLOOD PRESSURE: 158 MMHG | DIASTOLIC BLOOD PRESSURE: 68 MMHG

## 2021-10-21 DIAGNOSIS — Z98.890 OTHER SPECIFIED POSTPROCEDURAL STATES: ICD-10-CM

## 2021-10-21 PROCEDURE — 93000 ELECTROCARDIOGRAM COMPLETE: CPT

## 2021-10-21 PROCEDURE — 99215 OFFICE O/P EST HI 40 MIN: CPT | Mod: 25

## 2021-10-22 ENCOUNTER — APPOINTMENT (OUTPATIENT)
Dept: RADIATION ONCOLOGY | Facility: CLINIC | Age: 81
End: 2021-10-22
Payer: MEDICARE

## 2021-10-22 VITALS
HEIGHT: 72 IN | HEART RATE: 88 BPM | TEMPERATURE: 98.1 F | SYSTOLIC BLOOD PRESSURE: 155 MMHG | OXYGEN SATURATION: 99 % | WEIGHT: 132 LBS | DIASTOLIC BLOOD PRESSURE: 78 MMHG | BODY MASS INDEX: 17.88 KG/M2

## 2021-10-22 PROCEDURE — 99214 OFFICE O/P EST MOD 30 MIN: CPT

## 2021-10-22 NOTE — VITALS
[Maximal Pain Intensity: 0/10] : 0/10 [Least Pain Intensity: 0/10] : 0/10 [70: Cares for self; unalbe to carry on normal activity or do active work.] : 70: Cares for self; unable to carry on normal activity or do active work. [Date: ____________] : Patient's last distress assessment performed on [unfilled]. [5 - Distress Level] : Distress Level: 5 [FreeTextEntry7] : denies the need for social work

## 2021-10-22 NOTE — HISTORY OF PRESENT ILLNESS
[FreeTextEntry1] : Mr. 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 Fer Franklin received 2 prior courses of radiation treatments at [facility]:\par 1. 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 \par 2. 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 He underwent left upper lobectomy on 10/26/20 showing 3.5cm NATALIIA SCC, negative margins, compatible with metastatic SCC.\par \par Prostate Onc Hx:\par ----------------------\par 3/19/21- Originally followed up with [facility] and noted elevated PSA 12.48 ng/mL\par 4/2021-  PSA 11.6ng/mL\par Dr. Briscoe noted that his digital rectal exam was abnormal with bilateral induration.  \par \par 6/2/21 MRI Prostate \par 1.10x9 mm right posteromedial midgland(extending to base) peripheral zone lesion. PI-RADS 4, high(clinically significant cancer likely). No extra prostatic extension. \par 2.8x7mm right posteromedial midgland (extending to apex) peripheral zone lesion. PI-RADS 4, high(clinically significant cancer likely). Lesion abuts capsule without extra prostatic extension. \par 3.No seminal vesical invasion or pelvic lymphadenopathy \par 4.Indeterminate right sacral lesion. Recommend bone scans for further evaluation\par 5.Partial visualization of small to moderate volume fluid in the lower abdomen, unknown etiology. Consider dedicated CT of the abdomen and pelvis for further evaluation. \par 6.Partial visualization of distal left hydroureter. This could be evaluated at time of CT abdomen and pelvis as well. \par  \par \par 6/17/21 CT CAP\par 1. Since 3/16/2021, there has been a decrease in size of a small left pleural effusion. Left pleural thickening again noted.\par 2. New small to moderate ascites in the pelvis, of uncertain etiology.\par 3. New mild left hydronephrosis, the cause of which is not seen.\par 4. Enlarged, lobular and heterogeneous prostate. Increase in size of sclerotic lesion right ilium. Prostate cancer should be ruled out.\par 5. Bladder wall thickening. This could be due to chronic bladder outlet obstruction versus infection.\par 6. No change mild retroperitoneal lymphadenopathy.\par 7. There are two cystic lesions in the pancreas. Recommend correlation with MRI of pancreas.\par 8. No change lung nodules. Continued follow-up recommended.\par \par 9/21/21- Biopsy of the Prostate\par Prostate Biopsy was done by Dr. Terry Obregon at [facility] \par 4+4 in 1/12 cores \par 3+3 in 1/12 cores\par 4+5 in 2/5 cores \par \par 10/12/21 CT Ab/Pelvis\par Overall stable evaluation with no significant change since 6/17/2021.\par Status post left upper lobectomy. No evidence of local recurrence. Unchanged small left pleural effusion\par Enlarged prostate and mildly thickened bladder wall as seen on prior study.\par 2 cystic pancreatic lesions have been stable.\par Persistent small ascites\par \par 10/12/21 Bone scan\par No Scintigraphic evidence of osseous metastasis\par \par 10/14/21- 15.40ng/mL\par \par 10/19/21- Followed up with Dr. Velasquez and received ADT injection\par \par Today he presents for consideration of radiation therapy to the prostate, referred by Dr. Velasquez.  Overall, the patient states he feels well and has had radiation and chemotherapy in the past. He c/o baseline fatigue that is constant and exacerbated by activity such as walking up steps, he is currently following up with cardiology for further testing. He notes baseline urine function with and nocturia x3, while not using flomax 0.4mg at night. He has good urine flow and c/o of some urinary frequency and urgency. He dysuria, hematuria, leakage or incontinence. He has well-formed bowel movements x1/ day, denies blood or mucous in the stool. He denies rectal pain and states a history of hemorrhoids. He had his last colonoscopy 5 years ago and his next colonoscopy is due now. He is currently on  ADT therapy with Dr. Velasquez, first dose 10/19/21. He has been unable to have erections for many years and is not interested in sexual activity. Denies any interest in sperm banking. Pt follows up regularly with Dr. Briscoe-Urologist.\par

## 2021-10-22 NOTE — REVIEW OF SYSTEMS
[Fatigue] : fatigue [Nocturia] : nocturia [IPSS Score (0-40): ___] : IPSS score: [unfilled] [EPIC-CP Score (0-60): ___] : EPIC-CP score: [unfilled] [Muscle Weakness] : muscle weakness [Skin Rash] : skin rash [Negative] : Psychiatric [Chest Pain] : no chest pain [Palpitations] : no palpitations [Lower Ext Edema] : no lower extremity edema [FreeTextEntry2] : thin [FreeTextEntry5] : fatigue with activity, but denies SOB, is following up with cardiology for further cardiac testing [de-identified] : yaan's skin rash scattered across chest and back areas

## 2021-10-22 NOTE — DISEASE MANAGEMENT
[2] : T2 [c] : c [10-20] : 10 - 20 ng/mL [Biopsy] : Patient had a biopsy on [9] : Template Biopsy Anni Score: 9 [IIIC] : IIIC

## 2021-10-22 NOTE — PHYSICAL EXAM
[Thin] : thin [Normal] : normal skin color and pigmentation and no rash [de-identified] : generalized weakness noted [FreeTextEntry1] : declined [de-identified] : yana's skin rash scattered across chest and back

## 2021-11-02 ENCOUNTER — FORM ENCOUNTER (OUTPATIENT)
Age: 81
End: 2021-11-02

## 2021-11-03 ENCOUNTER — RESULT REVIEW (OUTPATIENT)
Age: 81
End: 2021-11-03

## 2021-11-03 ENCOUNTER — OUTPATIENT (OUTPATIENT)
Dept: OUTPATIENT SERVICES | Facility: HOSPITAL | Age: 81
LOS: 1 days | End: 2021-11-03
Payer: MEDICARE

## 2021-11-03 DIAGNOSIS — R94.31 ABNORMAL ELECTROCARDIOGRAM [ECG] [EKG]: ICD-10-CM

## 2021-11-03 DIAGNOSIS — Z41.9 ENCOUNTER FOR PROCEDURE FOR PURPOSES OTHER THAN REMEDYING HEALTH STATE, UNSPECIFIED: Chronic | ICD-10-CM

## 2021-11-03 DIAGNOSIS — R06.02 SHORTNESS OF BREATH: ICD-10-CM

## 2021-11-03 DIAGNOSIS — Z90.89 ACQUIRED ABSENCE OF OTHER ORGANS: Chronic | ICD-10-CM

## 2021-11-03 DIAGNOSIS — Z90.49 ACQUIRED ABSENCE OF OTHER SPECIFIED PARTS OF DIGESTIVE TRACT: Chronic | ICD-10-CM

## 2021-11-03 DIAGNOSIS — C32.3 MALIGNANT NEOPLASM OF LARYNGEAL CARTILAGE: Chronic | ICD-10-CM

## 2021-11-03 PROCEDURE — 78452 HT MUSCLE IMAGE SPECT MULT: CPT | Mod: 26,MH

## 2021-11-03 PROCEDURE — A9505: CPT

## 2021-11-03 PROCEDURE — 93017 CV STRESS TEST TRACING ONLY: CPT

## 2021-11-03 PROCEDURE — 93016 CV STRESS TEST SUPVJ ONLY: CPT

## 2021-11-03 PROCEDURE — 78452 HT MUSCLE IMAGE SPECT MULT: CPT

## 2021-11-03 PROCEDURE — 93018 CV STRESS TEST I&R ONLY: CPT

## 2021-11-03 PROCEDURE — A9500: CPT

## 2021-11-09 ENCOUNTER — APPOINTMENT (OUTPATIENT)
Dept: HEART AND VASCULAR | Facility: CLINIC | Age: 81
End: 2021-11-09
Payer: MEDICARE

## 2021-11-09 VITALS
OXYGEN SATURATION: 98 % | TEMPERATURE: 98.6 F | DIASTOLIC BLOOD PRESSURE: 64 MMHG | SYSTOLIC BLOOD PRESSURE: 140 MMHG | HEIGHT: 72 IN | WEIGHT: 130 LBS | HEART RATE: 66 BPM | BODY MASS INDEX: 17.61 KG/M2

## 2021-11-09 DIAGNOSIS — R07.9 CHEST PAIN, UNSPECIFIED: ICD-10-CM

## 2021-11-09 PROCEDURE — 99214 OFFICE O/P EST MOD 30 MIN: CPT

## 2021-11-11 LAB
PSA FREE FLD-MCNC: 12 %
PSA FREE SERPL-MCNC: 1.82 NG/ML
PSA SERPL-MCNC: 15.4 NG/ML
TESTOST SERPL-MCNC: 339 NG/DL

## 2021-11-22 ENCOUNTER — APPOINTMENT (OUTPATIENT)
Dept: OTOLARYNGOLOGY | Facility: CLINIC | Age: 81
End: 2021-11-22
Payer: MEDICARE

## 2021-11-22 DIAGNOSIS — C77.0 SECONDARY AND UNSPECIFIED MALIGNANT NEOPLASM OF LYMPH NODES OF HEAD, FACE AND NECK: ICD-10-CM

## 2021-11-22 DIAGNOSIS — C10.9 MALIGNANT NEOPLASM OF OROPHARYNX, UNSPECIFIED: ICD-10-CM

## 2021-11-22 PROCEDURE — 31575 DIAGNOSTIC LARYNGOSCOPY: CPT

## 2021-11-22 PROCEDURE — 99214 OFFICE O/P EST MOD 30 MIN: CPT | Mod: 25

## 2021-11-23 ENCOUNTER — APPOINTMENT (OUTPATIENT)
Dept: HEART AND VASCULAR | Facility: CLINIC | Age: 81
End: 2021-11-23
Payer: MEDICARE

## 2021-11-23 ENCOUNTER — APPOINTMENT (OUTPATIENT)
Dept: HEART AND VASCULAR | Facility: CLINIC | Age: 81
End: 2021-11-23

## 2021-11-23 ENCOUNTER — APPOINTMENT (OUTPATIENT)
Dept: HEMATOLOGY ONCOLOGY | Facility: CLINIC | Age: 81
End: 2021-11-23
Payer: MEDICARE

## 2021-11-23 ENCOUNTER — LABORATORY RESULT (OUTPATIENT)
Age: 81
End: 2021-11-23

## 2021-11-23 VITALS
DIASTOLIC BLOOD PRESSURE: 67 MMHG | SYSTOLIC BLOOD PRESSURE: 152 MMHG | RESPIRATION RATE: 18 BRPM | OXYGEN SATURATION: 99 % | BODY MASS INDEX: 18.15 KG/M2 | WEIGHT: 134 LBS | TEMPERATURE: 98.3 F | HEIGHT: 72 IN | HEART RATE: 60 BPM

## 2021-11-23 PROCEDURE — 99443: CPT | Mod: 95

## 2021-11-23 PROCEDURE — 99215 OFFICE O/P EST HI 40 MIN: CPT

## 2021-11-29 LAB
ALBUMIN SERPL ELPH-MCNC: 3.8 G/DL
ALP BLD-CCNC: 80 U/L
ALT SERPL-CCNC: 13 U/L
ANION GAP SERPL CALC-SCNC: 5 MMOL/L
AST SERPL-CCNC: 22 U/L
BILIRUB SERPL-MCNC: 1.1 MG/DL
BUN SERPL-MCNC: 12 MG/DL
CALCIUM SERPL-MCNC: 9.7 MG/DL
CHLORIDE SERPL-SCNC: 99 MMOL/L
CO2 SERPL-SCNC: 36 MMOL/L
CREAT SERPL-MCNC: 0.6 MG/DL
GLUCOSE SERPL-MCNC: 83 MG/DL
POTASSIUM SERPL-SCNC: 4.3 MMOL/L
PROT SERPL-MCNC: 7 G/DL
PSA FREE FLD-MCNC: 14 %
PSA FREE SERPL-MCNC: 0.34 NG/ML
PSA SERPL-MCNC: 2.33 NG/ML
SODIUM SERPL-SCNC: 140 MMOL/L
TESTOST BND SERPL-MCNC: 2.8 PG/ML
TESTOSTERONE TOTAL S: 21 NG/DL

## 2021-11-30 NOTE — HISTORY OF PRESENT ILLNESS
[de-identified] : Mr. Franklin is an 80 year old male, never smoker, with history of hypothyroidism, SCC of the L supraglottic larynx treated w chemo(weekly cisplatin given by Dr. Dowell.)/radiation in 2016 and recurred in the right soft palate, operated 1/2018 and completed RT 4/ 2018, NATALIIA SCC, s/p left upper lobectomy on 10/26/20 who presents to clinic today to discuss about recent CT scans concerning of prostate malignancy.\par \par ONC Hx: \par Mr. 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 He was monitored every 3 months in [place] by Dr. Álvarez and also in [place] by Dr. Bao Sanders. His visit to Dr. Leon in February 2016 was notable for a concerning lesion arising at the right vallecula/right lateral pharyngeal wall. This was separate from the previously treated lesion in the left AE fold that had been removed by laser. On March 2, 2016, Dr. Leon biopsied the right vallecula which showed squamous proliferative papillary lesion with high-grade dysplasia, and the left aryepiglottic fold which showed squamous cell carcinoma. Pathology review at [facility] confirmed the diagnosis of SCC in the left AE fold and squamous dysplasia with high-grade features in the right vallecula. PET/CT scan showed a maximum SUV of 9.2 in the right vallecular area as well as a 1.6 cm left cervical lymph node with maximum SUV of 9.8. FNA of the left neck node on 4/7/16 showed SCC.  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  He was found to have recurrence in the Rt soft palate, Opx 1/2018, and completed 7000 cGy to the oropharynx with Dr. Pandya from 3/12/18-4/27/18, without chemotherapy. Trouble swallowing since that time, eats a normal diet but sometimes aspirates and coughs. 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 He underwent left upper lobectomy with Dr. Mackey on 10/26/20 showing 3.5cm NATALIIA SCC, negative margins, compatible with metastatic SCC.  Observation since then.  He has been following with Dr. Snow and has been CHEY of supraglottic larynx cancer. He has been following with Dr. Briscoe for enlarged prostate and underwent MRI prostate on 6/2/21 due to recent elevated PSA. MRI Prostate showed 10x9 mm, 8x7mm right posteromedial midgland peripheral zone lesion.  CT CAP on 6/17/21 showed enlarged, lobular and heterogeneous prostate. Increase in size of sclerotic lesion right ilium. Prostate cancer should be ruled out.\par \par FH- Mother with breast cancer, Father with larynx cancer\par Last colonoscopy and endoscopy was 4-5 years ago, not remarkable as per patient. \par \par 10/26/20\par Surgical Final Report\par 1. Left level 10 lymph node:\par - One lymph node negative for tumor (0/1).\par 2. Left lower 11 lymph node:\par - One lymph node negative for tumor (0/1).\par 3. Bronchial margin:\par - Bronchial margin negative for tumor.\par 4. Left upper lobe:\par - Lung with squamous cell carcinoma, 3.5 cm. , compatible with metastatic squamous cell carcinoma ( prior history of oropharyngeal squamous cell carcinoma.\par - Parenchymal and vascular margins negative for tumor.\par - Remaining lung with a microscopic tumorlet and foci of pleural and subpleural fibrosis.\par - Nine peribronchial lymph nodes negative for tumor (0/9).\par 5.  AP window lymph node:\par - Two lymph nodes negative for tumor (0/2)\par 6. Level 8 lymph node:\par - One lymph node negative for tumor (0/1).\par 7. Subcarinal lymph node:\par - One lymph node negative for tumor (0/1).\par \par 2/12/21 CT Chest\par Interval left upper lobectomy without suspicious focal abnormality. New moderate left pleural effusion.\par \par 2/26/21 Left-sided thoracentesis\par 700 cc cc of fluid were drained\par Final Diagnosis\par LEFT PLEURAL FLUID, THORACENTESIS:\par NONDIAGNOSTIC\par The specimen is composed of blood only.\par \par 3/16/21 PET/CT\par 1.  Small left pleural effusion without increased FDG uptake.\par 2.  Post left upper lobectomy. No FDG avid pulmonary nodules.\par \par 6/2/21 MRI Prostate \par 1.10x9 mm right posteromedial midgland(extending to base) peripheral zone lesion. PI-RADS 4, high(clinically significant cancer likely). No extra prostatic extension. \par 2.8x7mm right posteromedial midgland (extending to apex) peripheral zone lesion. PI-RADS 4, high(clinically significant cancer likely). Lesion abuts capsule without extra prostatic extension. \par 3.No seminal vesical invasion or pelvic lymphadenopathy \par 4.Indeterminate right sacral lesion. Recommend bone scans for further evaluation\par 5.Partial visualization of small to moderate volume fluid in the lower abdomen, unkown etiology. Consider dedicated CT of the abdomen and pelvis for further evaluation. \par 6.Partial visualization of distal left hydroureter. This could be evaluated at time of CT abdomen and pelvis as well. \par \par 6/17/21 CT CAP\par 1. Since 3/16/2021, there has been a decrease in size of a small left pleural effusion. Left pleural thickening again noted.\par 2. New small to moderate ascites in the pelvis, of uncertain etiology.\par 3. New mild left hydronephrosis, the cause of which is not seen.\par 4. Enlarged, lobular and heterogeneous prostate. Increase in size of sclerotic lesion right ilium. Prostate cancer should be ruled out.\par 5. Bladder wall thickening. This could be due to chronic bladder outlet obstruction versus infection.\par 6. No change mild retroperitoneal lymphadenopathy.\par 7. There are two cystic lesions in the pancreas. Recommend correlation with MRI of pancreas.\par 8. No change lung nodules. Continued follow-up recommended.\par  [de-identified] : Patient is being w/u for new [place] 9 localized prostate CA (4+4 in 1 of 12 cores and 3+ 3 in 1 of 12 cores) T1c.   Bone scan negative for distant mets.  No symptoms per patient and otherwise he's doing well.  Pt has tolerated casodex and received lupron injection last month-he will be due to receive q 3 month injection in Jan 22 but will be in [place] at that time.  Denies hot flashes, mood changes, energy changes or decrease.

## 2021-11-30 NOTE — REVIEW OF SYSTEMS
[Fatigue] : fatigue [Negative] : Allergic/Immunologic [Fever] : no fever [Chills] : no chills [Night Sweats] : no night sweats [Recent Change In Weight] : ~T no recent weight change [Dysphagia] : no dysphagia [Loss of Hearing] : no loss of hearing [Nosebleeds] : no nosebleeds [Hoarseness] : no hoarseness [Odynophagia] : no odynophagia [Mucosal Pain] : no mucosal pain [Shortness Of Breath] : no shortness of breath [Wheezing] : no wheezing [Cough] : no cough [SOB on Exertion] : no shortness of breath during exertion

## 2021-11-30 NOTE — ASSESSMENT
[FreeTextEntry1] : Mr. Franklin is an 80 year old male, never smoker, with history of hypothyroidism, SCC of the L supraglottic larynx treated w chemo(weekly cisplatin given by Dr. Dowell)/radiation in 2016 and recurred in the rightsoft palate, operated 1/2018 and completed RT 4/ 2018, NATALIIA SCC, s/p left upper lobectomy on 10/26/20 who presents to clinic today to discuss about recent CT scans concerning of prostate malignancy.\par \par Plan\par # Prostate mass r/o prostate malignancy\par -continue bicalutamide ingection 22.5mg q 3 months (due 1/22 and Rx given to pharmacy in [place]) and casodex 50mg daily \par -check PSA and testosterone levels today \par -f/u  Radiation Oncology (Dr. Gardner) \par -f/u Urology at [facility] \par \par #  NATALIIA SCC, s/p left upper lobectomy on 10/26/20 \par -CT on 10-/12/21 shows CHEY  \par - Active surveillance now\par -repeat CT Chest in 4 months (2/2022) \par -Continue f/u with Dr. Fontenot\par \par #SCC of the L supraglottic larynx,s/p CT/RT in 2016, recurred in the right soft palate, operated in 1/2018 and completed RT in 4/ 2018\par -CHEY \par -Continue to f/u with Dr. Snow and Dr. Pandya. \par -Continue daily neck exercises \par \par # Health maintenance \par -Colonoscopy as per PCP\par -Received covid vaccine\par \par RTC 2/22 \par spent 40 min with patient

## 2022-01-11 ENCOUNTER — OUTPATIENT (OUTPATIENT)
Dept: OUTPATIENT SERVICES | Facility: HOSPITAL | Age: 82
LOS: 1 days | End: 2022-01-11

## 2022-01-11 ENCOUNTER — APPOINTMENT (OUTPATIENT)
Age: 82
End: 2022-01-11

## 2022-01-11 DIAGNOSIS — Z90.89 ACQUIRED ABSENCE OF OTHER ORGANS: Chronic | ICD-10-CM

## 2022-01-11 DIAGNOSIS — Z90.49 ACQUIRED ABSENCE OF OTHER SPECIFIED PARTS OF DIGESTIVE TRACT: Chronic | ICD-10-CM

## 2022-01-11 DIAGNOSIS — Z41.9 ENCOUNTER FOR PROCEDURE FOR PURPOSES OTHER THAN REMEDYING HEALTH STATE, UNSPECIFIED: Chronic | ICD-10-CM

## 2022-01-11 DIAGNOSIS — C10.9 MALIGNANT NEOPLASM OF OROPHARYNX, UNSPECIFIED: ICD-10-CM

## 2022-01-11 DIAGNOSIS — C32.3 MALIGNANT NEOPLASM OF LARYNGEAL CARTILAGE: Chronic | ICD-10-CM

## 2022-01-14 ENCOUNTER — NON-APPOINTMENT (OUTPATIENT)
Age: 82
End: 2022-01-14

## 2022-03-07 ENCOUNTER — NON-APPOINTMENT (OUTPATIENT)
Age: 82
End: 2022-03-07

## 2022-03-08 ENCOUNTER — NON-APPOINTMENT (OUTPATIENT)
Age: 82
End: 2022-03-08

## 2022-03-10 ENCOUNTER — APPOINTMENT (OUTPATIENT)
Dept: CT IMAGING | Facility: HOSPITAL | Age: 82
End: 2022-03-10

## 2022-03-15 ENCOUNTER — NON-APPOINTMENT (OUTPATIENT)
Age: 82
End: 2022-03-15

## 2022-03-15 NOTE — ASSESSMENT
[FreeTextEntry1] : Mr. Franklin is an 80 year old male, never smoker, with history of hypothyroidism, SCC of the L supraglottic larynx treated w chemo(weekly cisplatin given by Dr. Dowell)/radiation in 2016 and recurred in the rightsoft palate, operated 1/2018 and completed RT 4/ 2018, NATALIIA SCC, s/p left upper lobectomy on 10/26/20 who presents to clinic today to discuss about recent CT scans concerning of prostate malignancy.\par \par Plan\par # Prostate mass r/o prostate malignancy\par -continue bicalutamide ingection 22.5mg q 3 months (due 1/22 and Rx given to pharmacy in [place]) and casodex 50mg daily \par -check PSA and testosterone levels today \par -f/u  Radiation Oncology (Dr. Gardner) \par -f/u Urology at [facility] \par \par #  NATALIIA SCC, s/p left upper lobectomy on 10/26/20 \par -CT on 10-/12/21 shows CHEY  \par - Active surveillance now\par -repeat CT Chest in 4 months (2/2022) \par -Continue f/u with Dr. Fontenot\par \par #SCC of the L supraglottic larynx,s/p CT/RT in 2016, recurred in the right soft palate, operated in 1/2018 and completed RT in 4/ 2018\par -CHEY \par -Continue to f/u with Dr. Snow and Dr. Pandya. \par -Continue daily neck exercises \par \par # Health maintenance \par -Colonoscopy as per PCP\par -Received covid vaccine\par \par RTC 2/22 \par spent 40 min with patient

## 2022-03-15 NOTE — HISTORY OF PRESENT ILLNESS
[de-identified] : Mr. Franklin is an 81 year old male, never smoker, with history of hypothyroidism, SCC of the L supraglottic larynx treated w chemo(weekly cisplatin given by Dr. Dowell.)/radiation in 2016 and recurred in the right soft palate, operated 1/2018 and completed RT 4/ 2018, NATALIIA SCC, s/p left upper lobectomy on 10/26/20 who presents to clinic today to discuss about recent CT scans concerning of prostate malignancy.\par \par ONC Hx: \par Mr. 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 He was monitored every 3 months in [place] by Dr. Álvarez and also in [place] by Dr. Bao Sanders. His visit to Dr. Leon in February 2016 was notable for a concerning lesion arising at the right vallecula/right lateral pharyngeal wall. This was separate from the previously treated lesion in the left AE fold that had been removed by laser. On March 2, 2016, Dr. Leon biopsied the right vallecula which showed squamous proliferative papillary lesion with high-grade dysplasia, and the left aryepiglottic fold which showed squamous cell carcinoma. Pathology review at [facility] confirmed the diagnosis of SCC in the left AE fold and squamous dysplasia with high-grade features in the right vallecula. PET/CT scan showed a maximum SUV of 9.2 in the right vallecular area as well as a 1.6 cm left cervical lymph node with maximum SUV of 9.8. FNA of the left neck node on 4/7/16 showed SCC.  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  He was found to have recurrence in the Rt soft palate, Opx 1/2018, and completed 7000 cGy to the oropharynx with Dr. Pandya from 3/12/18-4/27/18, without chemotherapy. Trouble swallowing since that time, eats a normal diet but sometimes aspirates and coughs. 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 He underwent left upper lobectomy with Dr. Mackey on 10/26/20 showing 3.5cm NATALIIA SCC, negative margins, compatible with metastatic SCC.  Observation since then.  He has been following with Dr. Snow and has been CHEY of supraglottic larynx cancer. He has been following with Dr. Briscoe for enlarged prostate and underwent MRI prostate on 6/2/21 due to recent elevated PSA. MRI Prostate showed 10x9 mm, 8x7mm right posteromedial midgland peripheral zone lesion.  CT CAP on 6/17/21 showed enlarged, lobular and heterogeneous prostate. Increase in size of sclerotic lesion right ilium. Prostate cancer should be ruled out.\par \par FH- Mother with breast cancer, Father with larynx cancer\par Last colonoscopy and endoscopy was 4-5 years ago, not remarkable as per patient. \par \par 10/26/20\par Surgical Final Report\par 1. Left level 10 lymph node:\par - One lymph node negative for tumor (0/1).\par 2. Left lower 11 lymph node:\par - One lymph node negative for tumor (0/1).\par 3. Bronchial margin:\par - Bronchial margin negative for tumor.\par 4. Left upper lobe:\par - Lung with squamous cell carcinoma, 3.5 cm. , compatible with metastatic squamous cell carcinoma ( prior history of oropharyngeal squamous cell carcinoma.\par - Parenchymal and vascular margins negative for tumor.\par - Remaining lung with a microscopic tumorlet and foci of pleural and subpleural fibrosis.\par - Nine peribronchial lymph nodes negative for tumor (0/9).\par 5.  AP window lymph node:\par - Two lymph nodes negative for tumor (0/2)\par 6. Level 8 lymph node:\par - One lymph node negative for tumor (0/1).\par 7. Subcarinal lymph node:\par - One lymph node negative for tumor (0/1).\par \par 2/12/21 CT Chest\par Interval left upper lobectomy without suspicious focal abnormality. New moderate left pleural effusion.\par \par 2/26/21 Left-sided thoracentesis\par 700 cc cc of fluid were drained\par Final Diagnosis\par LEFT PLEURAL FLUID, THORACENTESIS:\par NONDIAGNOSTIC\par The specimen is composed of blood only.\par \par 3/16/21 PET/CT\par 1.  Small left pleural effusion without increased FDG uptake.\par 2.  Post left upper lobectomy. No FDG avid pulmonary nodules.\par \par 6/2/21 MRI Prostate \par 1.10x9 mm right posteromedial midgland(extending to base) peripheral zone lesion. PI-RADS 4, high(clinically significant cancer likely). No extra prostatic extension. \par 2.8x7mm right posteromedial midgland (extending to apex) peripheral zone lesion. PI-RADS 4, high(clinically significant cancer likely). Lesion abuts capsule without extra prostatic extension. \par 3.No seminal vesical invasion or pelvic lymphadenopathy \par 4.Indeterminate right sacral lesion. Recommend bone scans for further evaluation\par 5.Partial visualization of small to moderate volume fluid in the lower abdomen, unkown etiology. Consider dedicated CT of the abdomen and pelvis for further evaluation. \par 6.Partial visualization of distal left hydroureter. This could be evaluated at time of CT abdomen and pelvis as well. \par \par 6/17/21 CT CAP\par 1. Since 3/16/2021, there has been a decrease in size of a small left pleural effusion. Left pleural thickening again noted.\par 2. New small to moderate ascites in the pelvis, of uncertain etiology.\par 3. New mild left hydronephrosis, the cause of which is not seen.\par 4. Enlarged, lobular and heterogeneous prostate. Increase in size of sclerotic lesion right ilium. Prostate cancer should be ruled out.\par 5. Bladder wall thickening. This could be due to chronic bladder outlet obstruction versus infection.\par 6. No change mild retroperitoneal lymphadenopathy.\par 7. There are two cystic lesions in the pancreas. Recommend correlation with MRI of pancreas.\par 8. No change lung nodules. Continued follow-up recommended.\par  [de-identified] : Patient is being w/u for new [place] 9 localized prostate CA (4+4 in 1 of 12 cores and 3+ 3 in 1 of 12 cores) T1c.   Bone scan negative for distant mets.  No symptoms per patient and otherwise he's doing well.  Pt has tolerated casodex and received lupron injection last month-he will be due to receive q 3 month injection in Jan 22 but will be in [place] at that time.  Denies hot flashes, mood changes, energy changes or decrease.

## 2022-03-15 NOTE — REVIEW OF SYSTEMS
[Fever] : no fever [Chills] : no chills [Night Sweats] : no night sweats [Fatigue] : fatigue [Recent Change In Weight] : ~T no recent weight change [Dysphagia] : no dysphagia [Loss of Hearing] : no loss of hearing [Nosebleeds] : no nosebleeds [Hoarseness] : no hoarseness [Odynophagia] : no odynophagia [Mucosal Pain] : no mucosal pain [Shortness Of Breath] : no shortness of breath [Wheezing] : no wheezing [Cough] : no cough [SOB on Exertion] : no shortness of breath during exertion [Negative] : Allergic/Immunologic

## 2022-03-17 ENCOUNTER — APPOINTMENT (OUTPATIENT)
Dept: HEMATOLOGY ONCOLOGY | Facility: CLINIC | Age: 82
End: 2022-03-17
Payer: MEDICARE

## 2022-03-21 ENCOUNTER — APPOINTMENT (OUTPATIENT)
Dept: UROLOGY | Facility: CLINIC | Age: 82
End: 2022-03-21
Payer: MEDICARE

## 2022-03-21 VITALS — TEMPERATURE: 97.4 F | HEART RATE: 67 BPM | DIASTOLIC BLOOD PRESSURE: 81 MMHG | SYSTOLIC BLOOD PRESSURE: 175 MMHG

## 2022-03-21 PROCEDURE — 55874Z: CUSTOM

## 2022-03-21 PROCEDURE — 55876 PLACE RT DEVICE/MARKER PROS: CPT

## 2022-03-21 PROCEDURE — A4648: CPT | Mod: NC

## 2022-03-23 ENCOUNTER — NON-APPOINTMENT (OUTPATIENT)
Age: 82
End: 2022-03-23

## 2022-03-23 VITALS — HEIGHT: 72 IN | BODY MASS INDEX: 16.52 KG/M2 | WEIGHT: 122 LBS

## 2022-03-28 ENCOUNTER — LABORATORY RESULT (OUTPATIENT)
Age: 82
End: 2022-03-28

## 2022-03-28 ENCOUNTER — APPOINTMENT (OUTPATIENT)
Dept: HEMATOLOGY ONCOLOGY | Facility: CLINIC | Age: 82
End: 2022-03-28
Payer: MEDICARE

## 2022-03-28 VITALS
WEIGHT: 128 LBS | TEMPERATURE: 95.6 F | HEIGHT: 72 IN | SYSTOLIC BLOOD PRESSURE: 126 MMHG | RESPIRATION RATE: 18 BRPM | DIASTOLIC BLOOD PRESSURE: 61 MMHG | BODY MASS INDEX: 17.34 KG/M2 | HEART RATE: 71 BPM | OXYGEN SATURATION: 98 %

## 2022-03-28 LAB
ALBUMIN SERPL ELPH-MCNC: 3.4 G/DL
ALP BLD-CCNC: 82 U/L
ALT SERPL-CCNC: 6 U/L
ANION GAP SERPL CALC-SCNC: 7 MMOL/L
AST SERPL-CCNC: 25 U/L
BASOPHILS # BLD AUTO: 0.05 K/UL
BASOPHILS NFR BLD AUTO: 0.9 %
BILIRUB SERPL-MCNC: 1 MG/DL
BUN SERPL-MCNC: 16 MG/DL
CALCIUM SERPL-MCNC: 9.9 MG/DL
CHLORIDE SERPL-SCNC: 98 MMOL/L
CO2 SERPL-SCNC: 34 MMOL/L
CREAT SERPL-MCNC: 0.6 MG/DL
EGFR: 97 ML/MIN/1.73M2
EOSINOPHIL # BLD AUTO: 0.09 K/UL
EOSINOPHIL NFR BLD AUTO: 1.7 %
GLUCOSE SERPL-MCNC: 87 MG/DL
HCT VFR BLD CALC: 34.4 %
HGB BLD-MCNC: 11.1 G/DL
LYMPHOCYTES # BLD AUTO: 0.36 K/UL
LYMPHOCYTES NFR BLD AUTO: 7.1 %
MAN DIFF?: NORMAL
MCHC RBC-ENTMCNC: 30.1 PG
MCHC RBC-ENTMCNC: 32.3 GM/DL
MCV RBC AUTO: 93.2 FL
MONOCYTES # BLD AUTO: 0.27 K/UL
MONOCYTES NFR BLD AUTO: 5.3 %
NEUTROPHILS # BLD AUTO: 4.2 K/UL
NEUTROPHILS NFR BLD AUTO: 83.2 %
PLATELET # BLD AUTO: 217 K/UL
POTASSIUM SERPL-SCNC: 3.9 MMOL/L
PROT SERPL-MCNC: 7 G/DL
RBC # BLD: 3.69 M/UL
RBC # FLD: 12.7 %
SODIUM SERPL-SCNC: 139 MMOL/L
WBC # FLD AUTO: 5.05 K/UL

## 2022-03-28 PROCEDURE — 99215 OFFICE O/P EST HI 40 MIN: CPT

## 2022-03-28 NOTE — ASSESSMENT
[FreeTextEntry1] : Mr. Franklin is an 81 year old male, never smoker, with history of hypothyroidism, SCC of the L supraglottic larynx treated w chemo(weekly cisplatin given by Dr. Dowell)/radiation in 2016 and recurred in the rightsoft palate, operated 1/2018 and completed RT 4/ 2018, NATALIIA SCC, s/p left upper lobectomy on 10/26/20 who presents to clinic today to discuss about recent CT scans concerning of prostate malignancy.\par \par Plan\par # Prostate mass r/o prostate malignancy\par -continue bicalutamide and lupron ingection 22.5mg q 3 months  next dose due 4/14/22 \par -RT with Dr. Gardner \par -f/u PSA today \par \par -check PSA and testosterone levels today \par -f/u  Radiation Oncology (Dr. Gardner) \par -f/u Urology at [facility] \par \par #  NATALIIA SCC, s/p left upper lobectomy on 10/26/20 \par -CT CHest ordered and will f/u at next appt 4/22 \par \par #SCC of the L supraglottic larynx,s/p CT/RT in 2016, recurred in the right soft palate, operated in 1/2018 and completed RT in 4/ 2018\par -CHEY \par -Continue to f/u with Dr. Snow\par -Continue daily neck exercises \par \par # dysphagia/odynophagia could be 2/2 post RT strictures, achalasia, etc.. \par -Pt to follow up with his GI doc \par -discussed the differential dx and w/u (EGD) and possible dilitatation.  \par \par # Health maintenance \par -Colonoscopy as per PCP\par -Received covid vaccine\par \par RTC 4/14/22 \par spent 40 min with patient

## 2022-03-28 NOTE — HISTORY OF PRESENT ILLNESS
[de-identified] : Mr. Franklin is an 81 year old male, never smoker, with history of hypothyroidism, SCC of the L supraglottic larynx treated w chemo(weekly cisplatin given by Dr. Dowell.)/radiation in 2016 and recurred in the right soft palate, operated 1/2018 and completed RT 4/ 2018, NATALIIA SCC, s/p left upper lobectomy on 10/26/20 who presents to clinic today to discuss about recent CT scans concerning of prostate malignancy.\par \par ONC Hx: \par Mr. 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 He was monitored every 3 months in [place] by Dr. Álvarez and also in [place] by Dr. Bao Sanders. His visit to Dr. Leon in February 2016 was notable for a concerning lesion arising at the right vallecula/right lateral pharyngeal wall. This was separate from the previously treated lesion in the left AE fold that had been removed by laser. On March 2, 2016, Dr. Leon biopsied the right vallecula which showed squamous proliferative papillary lesion with high-grade dysplasia, and the left aryepiglottic fold which showed squamous cell carcinoma. Pathology review at [facility] confirmed the diagnosis of SCC in the left AE fold and squamous dysplasia with high-grade features in the right vallecula. PET/CT scan showed a maximum SUV of 9.2 in the right vallecular area as well as a 1.6 cm left cervical lymph node with maximum SUV of 9.8. FNA of the left neck node on 4/7/16 showed SCC.  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  He was found to have recurrence in the Rt soft palate, Opx 1/2018, and completed 7000 cGy to the oropharynx with Dr. Pandya from 3/12/18-4/27/18, without chemotherapy. Trouble swallowing since that time, eats a normal diet but sometimes aspirates and coughs. 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 He underwent left upper lobectomy with Dr. Mackey on 10/26/20 showing 3.5cm NATALIIA SCC, negative margins, compatible with metastatic SCC.  Observation since then.  He has been following with Dr. Snow and has been CHEY of supraglottic larynx cancer. He has been following with Dr. Briscoe for enlarged prostate and underwent MRI prostate on 6/2/21 due to recent elevated PSA. MRI Prostate showed 10x9 mm, 8x7mm right posteromedial midgland peripheral zone lesion.  CT CAP on 6/17/21 showed enlarged, lobular and heterogeneous prostate. Increase in size of sclerotic lesion right ilium. Prostate cancer should be ruled out.\par \par FH- Mother with breast cancer, Father with larynx cancer\par Last colonoscopy and endoscopy was 4-5 years ago, not remarkable as per patient. \par \par 10/26/20\par Surgical Final Report\par 1. Left level 10 lymph node:\par - One lymph node negative for tumor (0/1).\par 2. Left lower 11 lymph node:\par - One lymph node negative for tumor (0/1).\par 3. Bronchial margin:\par - Bronchial margin negative for tumor.\par 4. Left upper lobe:\par - Lung with squamous cell carcinoma, 3.5 cm. , compatible with metastatic squamous cell carcinoma ( prior history of oropharyngeal squamous cell carcinoma.\par - Parenchymal and vascular margins negative for tumor.\par - Remaining lung with a microscopic tumorlet and foci of pleural and subpleural fibrosis.\par - Nine peribronchial lymph nodes negative for tumor (0/9).\par 5.  AP window lymph node:\par - Two lymph nodes negative for tumor (0/2)\par 6. Level 8 lymph node:\par - One lymph node negative for tumor (0/1).\par 7. Subcarinal lymph node:\par - One lymph node negative for tumor (0/1).\par \par 2/12/21 CT Chest\par Interval left upper lobectomy without suspicious focal abnormality. New moderate left pleural effusion.\par \par 2/26/21 Left-sided thoracentesis\par 700 cc cc of fluid were drained\par Final Diagnosis\par LEFT PLEURAL FLUID, THORACENTESIS:\par NONDIAGNOSTIC\par The specimen is composed of blood only.\par \par 3/16/21 PET/CT\par 1.  Small left pleural effusion without increased FDG uptake.\par 2.  Post left upper lobectomy. No FDG avid pulmonary nodules.\par \par 6/2/21 MRI Prostate \par 1.10x9 mm right posteromedial midgland(extending to base) peripheral zone lesion. PI-RADS 4, high(clinically significant cancer likely). No extra prostatic extension. \par 2.8x7mm right posteromedial midgland (extending to apex) peripheral zone lesion. PI-RADS 4, high(clinically significant cancer likely). Lesion abuts capsule without extra prostatic extension. \par 3.No seminal vesical invasion or pelvic lymphadenopathy \par 4.Indeterminate right sacral lesion. Recommend bone scans for further evaluation\par 5.Partial visualization of small to moderate volume fluid in the lower abdomen, unkown etiology. Consider dedicated CT of the abdomen and pelvis for further evaluation. \par 6.Partial visualization of distal left hydroureter. This could be evaluated at time of CT abdomen and pelvis as well. \par \par 6/17/21 CT CAP\par 1. Since 3/16/2021, there has been a decrease in size of a small left pleural effusion. Left pleural thickening again noted.\par 2. New small to moderate ascites in the pelvis, of uncertain etiology.\par 3. New mild left hydronephrosis, the cause of which is not seen.\par 4. Enlarged, lobular and heterogeneous prostate. Increase in size of sclerotic lesion right ilium. Prostate cancer should be ruled out.\par 5. Bladder wall thickening. This could be due to chronic bladder outlet obstruction versus infection.\par 6. No change mild retroperitoneal lymphadenopathy.\par 7. There are two cystic lesions in the pancreas. Recommend correlation with MRI of pancreas.\par 8. No change lung nodules. Continued follow-up recommended.\par \par 3/23/22 MRI PROSTATE WITH AND WITHOUT CONTRAST\par Minimal extracapsular extension of disease posterior right prostate gland. This is however  from the anterior wall of rectum by the spacer material. Key images are provided for your review. [de-identified] : Patient returns from [place] where he had lupron injection 1/16/22.  No new complaints and still has fatigue but denies hot flashes.  Has been simulated for RT to prostate.  No CT Chest available.  Also, patient c/o difficulty swallowing and has lost approx 5-10 lbs over the last 6 months.  States that food gets stuck but he has not had regurgitate but just eat more slowly and nothing dry.  Patient has a GI doc he'd like to see.  Denies any lumps or bumps.

## 2022-03-28 NOTE — REVIEW OF SYSTEMS
[Fever] : no fever [Chills] : no chills [Night Sweats] : no night sweats [Recent Change In Weight] : ~T no recent weight change [Dysphagia] : no dysphagia [Loss of Hearing] : no loss of hearing [Nosebleeds] : no nosebleeds [Hoarseness] : no hoarseness [Odynophagia] : no odynophagia [Mucosal Pain] : no mucosal pain [Shortness Of Breath] : no shortness of breath [Wheezing] : no wheezing [Cough] : no cough [SOB on Exertion] : no shortness of breath during exertion

## 2022-03-29 LAB
PSA FREE FLD-MCNC: 13 %
PSA FREE SERPL-MCNC: 0.03 NG/ML
PSA SERPL-MCNC: 0.25 NG/ML
TESTOST FREE SERPL-MCNC: 2.1 PG/ML
TESTOST SERPL-MCNC: 12.3 NG/DL

## 2022-04-02 ENCOUNTER — INPATIENT (INPATIENT)
Facility: HOSPITAL | Age: 82
LOS: 0 days | Discharge: HOME | DRG: 871 | End: 2022-04-03
Attending: STUDENT IN AN ORGANIZED HEALTH CARE EDUCATION/TRAINING PROGRAM | Admitting: HOSPITALIST
Payer: COMMERCIAL

## 2022-04-02 VITALS
HEART RATE: 95 BPM | RESPIRATION RATE: 20 BRPM | OXYGEN SATURATION: 95 % | HEIGHT: 71 IN | WEIGHT: 121.92 LBS | SYSTOLIC BLOOD PRESSURE: 144 MMHG | TEMPERATURE: 99 F | DIASTOLIC BLOOD PRESSURE: 80 MMHG

## 2022-04-02 DIAGNOSIS — Z41.9 ENCOUNTER FOR PROCEDURE FOR PURPOSES OTHER THAN REMEDYING HEALTH STATE, UNSPECIFIED: Chronic | ICD-10-CM

## 2022-04-02 DIAGNOSIS — Z90.49 ACQUIRED ABSENCE OF OTHER SPECIFIED PARTS OF DIGESTIVE TRACT: Chronic | ICD-10-CM

## 2022-04-02 DIAGNOSIS — Z90.89 ACQUIRED ABSENCE OF OTHER ORGANS: Chronic | ICD-10-CM

## 2022-04-02 DIAGNOSIS — A41.9 SEPSIS, UNSPECIFIED ORGANISM: ICD-10-CM

## 2022-04-02 DIAGNOSIS — C32.3 MALIGNANT NEOPLASM OF LARYNGEAL CARTILAGE: Chronic | ICD-10-CM

## 2022-04-02 LAB
ALBUMIN SERPL ELPH-MCNC: 3.6 G/DL — SIGNIFICANT CHANGE UP (ref 3.3–5)
ALP SERPL-CCNC: 65 U/L — SIGNIFICANT CHANGE UP (ref 40–120)
ALT FLD-CCNC: 10 U/L — SIGNIFICANT CHANGE UP (ref 10–45)
ANION GAP SERPL CALC-SCNC: 9 MMOL/L — SIGNIFICANT CHANGE UP (ref 5–17)
APPEARANCE UR: CLEAR — SIGNIFICANT CHANGE UP
APTT BLD: 35.1 SEC — SIGNIFICANT CHANGE UP (ref 27.5–35.5)
AST SERPL-CCNC: 23 U/L — SIGNIFICANT CHANGE UP (ref 10–40)
BACTERIA # UR AUTO: PRESENT /HPF
BASOPHILS # BLD AUTO: 0 K/UL — SIGNIFICANT CHANGE UP (ref 0–0.2)
BASOPHILS NFR BLD AUTO: 0 % — SIGNIFICANT CHANGE UP (ref 0–2)
BILIRUB SERPL-MCNC: 0.5 MG/DL — SIGNIFICANT CHANGE UP (ref 0.2–1.2)
BILIRUB UR-MCNC: NEGATIVE — SIGNIFICANT CHANGE UP
BUN SERPL-MCNC: 20 MG/DL — SIGNIFICANT CHANGE UP (ref 7–23)
CALCIUM SERPL-MCNC: 9 MG/DL — SIGNIFICANT CHANGE UP (ref 8.4–10.5)
CHLORIDE SERPL-SCNC: 94 MMOL/L — LOW (ref 96–108)
CO2 SERPL-SCNC: 33 MMOL/L — HIGH (ref 22–31)
COLOR SPEC: YELLOW — SIGNIFICANT CHANGE UP
COMMENT - URINE: SIGNIFICANT CHANGE UP
CREAT SERPL-MCNC: 0.88 MG/DL — SIGNIFICANT CHANGE UP (ref 0.5–1.3)
DIFF PNL FLD: ABNORMAL
EGFR: 86 ML/MIN/1.73M2 — SIGNIFICANT CHANGE UP
EOSINOPHIL # BLD AUTO: 0 K/UL — SIGNIFICANT CHANGE UP (ref 0–0.5)
EOSINOPHIL NFR BLD AUTO: 0 % — SIGNIFICANT CHANGE UP (ref 0–6)
FLUAV H1 2009 PAND RNA SPEC QL NAA+PROBE: DETECTED
GLUCOSE SERPL-MCNC: 119 MG/DL — HIGH (ref 70–99)
GLUCOSE UR QL: NEGATIVE — SIGNIFICANT CHANGE UP
HCT VFR BLD CALC: 32.9 % — LOW (ref 39–50)
HGB BLD-MCNC: 10.8 G/DL — LOW (ref 13–17)
HYALINE CASTS # UR AUTO: ABNORMAL /LPF (ref 0–2)
INR BLD: 1.13 — SIGNIFICANT CHANGE UP (ref 0.88–1.16)
KETONES UR-MCNC: NEGATIVE — SIGNIFICANT CHANGE UP
LACTATE SERPL-SCNC: 1.4 MMOL/L — SIGNIFICANT CHANGE UP (ref 0.5–2)
LEUKOCYTE ESTERASE UR-ACNC: NEGATIVE — SIGNIFICANT CHANGE UP
LYMPHOCYTES # BLD AUTO: 0.1 K/UL — LOW (ref 1–3.3)
LYMPHOCYTES # BLD AUTO: 0.9 % — LOW (ref 13–44)
MCHC RBC-ENTMCNC: 30.1 PG — SIGNIFICANT CHANGE UP (ref 27–34)
MCHC RBC-ENTMCNC: 32.8 GM/DL — SIGNIFICANT CHANGE UP (ref 32–36)
MCV RBC AUTO: 91.6 FL — SIGNIFICANT CHANGE UP (ref 80–100)
MONOCYTES # BLD AUTO: 0.28 K/UL — SIGNIFICANT CHANGE UP (ref 0–0.9)
MONOCYTES NFR BLD AUTO: 2.6 % — SIGNIFICANT CHANGE UP (ref 2–14)
NEUTROPHILS # BLD AUTO: 9.76 K/UL — HIGH (ref 1.8–7.4)
NEUTROPHILS NFR BLD AUTO: 71.3 % — SIGNIFICANT CHANGE UP (ref 43–77)
NITRITE UR-MCNC: NEGATIVE — SIGNIFICANT CHANGE UP
PH UR: 6 — SIGNIFICANT CHANGE UP (ref 5–8)
PLATELET # BLD AUTO: 174 K/UL — SIGNIFICANT CHANGE UP (ref 150–400)
POTASSIUM SERPL-MCNC: 3.7 MMOL/L — SIGNIFICANT CHANGE UP (ref 3.5–5.3)
POTASSIUM SERPL-SCNC: 3.7 MMOL/L — SIGNIFICANT CHANGE UP (ref 3.5–5.3)
PROT SERPL-MCNC: 6.9 G/DL — SIGNIFICANT CHANGE UP (ref 6–8.3)
PROT UR-MCNC: 30 MG/DL
PROTHROM AB SERPL-ACNC: 13.5 SEC — HIGH (ref 10.5–13.4)
RAPID RVP RESULT: DETECTED
RBC # BLD: 3.59 M/UL — LOW (ref 4.2–5.8)
RBC # FLD: 12.7 % — SIGNIFICANT CHANGE UP (ref 10.3–14.5)
RBC CASTS # UR COMP ASSIST: < 5 /HPF — SIGNIFICANT CHANGE UP
SARS-COV-2 RNA SPEC QL NAA+PROBE: SIGNIFICANT CHANGE UP
SODIUM SERPL-SCNC: 136 MMOL/L — SIGNIFICANT CHANGE UP (ref 135–145)
SP GR SPEC: >=1.03 — SIGNIFICANT CHANGE UP (ref 1–1.03)
UROBILINOGEN FLD QL: 0.2 E.U./DL — SIGNIFICANT CHANGE UP
WBC # BLD: 10.6 K/UL — HIGH (ref 3.8–10.5)
WBC # FLD AUTO: 10.6 K/UL — HIGH (ref 3.8–10.5)
WBC UR QL: < 5 /HPF — SIGNIFICANT CHANGE UP

## 2022-04-02 PROCEDURE — 71046 X-RAY EXAM CHEST 2 VIEWS: CPT | Mod: 26

## 2022-04-02 PROCEDURE — 93010 ELECTROCARDIOGRAM REPORT: CPT

## 2022-04-02 PROCEDURE — 99285 EMERGENCY DEPT VISIT HI MDM: CPT | Mod: FS

## 2022-04-02 PROCEDURE — 99223 1ST HOSP IP/OBS HIGH 75: CPT | Mod: GC

## 2022-04-02 RX ORDER — ACETAMINOPHEN 500 MG
650 TABLET ORAL EVERY 6 HOURS
Refills: 0 | Status: DISCONTINUED | OUTPATIENT
Start: 2022-04-02 | End: 2022-04-03

## 2022-04-02 RX ORDER — SODIUM CHLORIDE 9 MG/ML
1000 INJECTION INTRAMUSCULAR; INTRAVENOUS; SUBCUTANEOUS ONCE
Refills: 0 | Status: COMPLETED | OUTPATIENT
Start: 2022-04-02 | End: 2022-04-02

## 2022-04-02 RX ORDER — ACETAMINOPHEN 500 MG
975 TABLET ORAL ONCE
Refills: 0 | Status: COMPLETED | OUTPATIENT
Start: 2022-04-02 | End: 2022-04-02

## 2022-04-02 RX ORDER — CEFTRIAXONE 500 MG/1
1000 INJECTION, POWDER, FOR SOLUTION INTRAMUSCULAR; INTRAVENOUS ONCE
Refills: 0 | Status: COMPLETED | OUTPATIENT
Start: 2022-04-02 | End: 2022-04-02

## 2022-04-02 RX ADMIN — Medication 975 MILLIGRAM(S): at 23:50

## 2022-04-02 RX ADMIN — SODIUM CHLORIDE 1000 MILLILITER(S): 9 INJECTION INTRAMUSCULAR; INTRAVENOUS; SUBCUTANEOUS at 18:07

## 2022-04-02 RX ADMIN — CEFTRIAXONE 100 MILLIGRAM(S): 500 INJECTION, POWDER, FOR SOLUTION INTRAMUSCULAR; INTRAVENOUS at 19:37

## 2022-04-02 RX ADMIN — Medication 975 MILLIGRAM(S): at 18:05

## 2022-04-02 NOTE — H&P ADULT - ATTENDING COMMENTS
#Sepsis: 2/2 influenza A; +bandemia, no signs of superimposed bacterial infxn. Diarrhea resolved, benign abd exam. CXR w/o infiltrate. UA neg. Observe off abx for now. F/up blood and urine cx, c/w tamiflu.

## 2022-04-02 NOTE — ED PROVIDER NOTE - CLINICAL SUMMARY MEDICAL DECISION MAKING FREE TEXT BOX
80 year old male, never smoker, with a PMHx hypothyroidism, SCC of the L supraglottic larynx treated w chemo/radiation in 2016 and recurred in the right soft palate, operated 1/2018, prostate ca c/o fever x 4 days, Tmax 102. Reports generalized fatigue and decreased appetite. Reports 2 episodes of loose stool today, nonbloody. Denies cp, sob, cough, n/v, abd pain, dysuria, bodyaches. Rapid covid neg yesterday. T 100.6. rectally. Non-toxic appearing. Skin warm to touch. Lungs cta b/l. Abd soft, nt, nd. r/o infection, labs, ua, cultures, cxr,

## 2022-04-02 NOTE — H&P ADULT - PROBLEM SELECTOR PLAN 4
slowly increasing bicarb since last admissionddx: includes possible chronic CO2 retention given vbg with evidence of retention and smoking history vs. GI losses. Patient mentating  - will send for urine chloride

## 2022-04-02 NOTE — H&P ADULT - HISTORY OF PRESENT ILLNESS
81M c hx hypothyroidism, SCC of the L supraglottic larynx treated w chemo/radiation in 2016 and recurred in the right soft palate, operated 1/2018. Non-keratinizing scc of of NATALIIA s/p NATALIIA lobectomy in 2020 and recent diagnosis of prostate cancer on leupron.     ED course:  vitals: tmax 100.6, HR 60-95, //65, RR 20-16, SPo2 97-95  Labs:WBC 10.60 (Bandemia 20.8%), Hgb 10.8 (baseline 11-12), , abnormal smear. Metamyelocytes 3.5%. Coags WNL. Metabolic alkalosis with bicarb 33. lactate 1.4. UA spec grav 1.030. Blood small amount with no rbcs, bacteria present and hyaline casts.   Imaging: CXR s/p NATALIIA, Hyperinflated RL, trace L effusion. No consolidation  EKG:  Interventions: 1g Tylenol, 1g ceftriacone, 1L NS.  Old echo: Normal L and R LV/RV function.  81M flu vaccinated, c hx hypothyroidism, SCC of the L supraglottic larynx treated w chemo/radiation in 2016 and recurred in the right soft palate, operated 1/2018. Non-keratinizing scc of of NATLAIIA s/p NATALIIA lobectomy in 2020 and recent diagnosis of prostate cancer x1 year on leupron. Presenting with x2-3 days of malaise, fatigue, decreased PO intake and tmax at home 102. No cough, no sob, no new rashes, no diarrhea, no dysuria. Due to persistent sxs patient brought by wife to ED.    ED course:  vitals: tmax 100.6, HR 60-95, //65, RR 20-16, SPo2 97-95  Labs:WBC 10.60 (Bandemia 20.8%), Hgb 10.8 (baseline 11-12), , abnormal smear. Metamyelocytes 3.5%. Coags WNL. Metabolic alkalosis with bicarb 33. lactate 1.4. UA spec grav 1.030. Blood small amount with no rbcs, bacteria present and hyaline casts.   Imaging: CXR s/p NATALIIA, Hyperinflated RL, trace L effusion. No consolidation  EKG:  Interventions: 1g Tylenol, 1g ceftriaxone 1L NS.  Old echo: Normal L and R LV/RV function.  81M flu vaccinated, c hx hypothyroidism, SCC of the L supraglottic larynx treated w chemo/radiation in 2016 and recurred in the right soft palate, operated 1/2018. Non-keratinizing scc of of NATALIIA s/p NATALIIA lobectomy in 2020 and recent diagnosis of prostate cancer x1 year on leupron. Presenting with x2-3 days of malaise, fatigue, decreased PO intake and tmax at home 102 and associated loose stools. No cough, no sob, no new rashes, no dysuria. Due to persistent sxs patient brought by wife to ED.    ED course:  vitals: tmax 100.6, HR 60-95, //65, RR 20-16, SPo2 97-95  Labs:WBC 10.60 (Bandemia 20.8%), Hgb 10.8 (baseline 11-12), , abnormal smear. Metamyelocytes 3.5%. Coags WNL. Metabolic alkalosis with bicarb 33. lactate 1.4. UA spec grav 1.030. Blood small amount with no rbcs, bacteria present and hyaline casts.   Imaging: CXR s/p NATALIIA, Hyperinflated RL, trace L effusion. No consolidation  EKG:  Interventions: 1g Tylenol, 1g ceftriaxone 1L NS.  Old echo: Normal L and R LV/RV function.

## 2022-04-02 NOTE — ED PROVIDER NOTE - ATTENDING CONTRIBUTION TO CARE
4 days fever, generalized weakness. no focus of infection. some mild diarrhea but no abdominal pain, soft non tender. history of cancer, labs/cultures/cxr obtained. wbc 10 but significant bandemia of 20. ceftriaxone given. cxr without focal infiltrate. ua neg. given bandemia, will admit r/o bacteremia/ sepsis, further eval. rvp pending at time of admit

## 2022-04-02 NOTE — H&P ADULT - NSHPLABSRESULTS_GEN_ALL_CORE
.  LABS:                         10.8   10.60 )-----------( 174      ( 02 Apr 2022 17:52 )             32.9     04-02    136  |  94<L>  |  20  ----------------------------<  119<H>  3.7   |  33<H>  |  0.88    Ca    9.0      02 Apr 2022 17:52    TPro  6.9  /  Alb  3.6  /  TBili  0.5  /  DBili  x   /  AST  23  /  ALT  10  /  AlkPhos  65  04-02    PT/INR - ( 02 Apr 2022 17:52 )   PT: 13.5 sec;   INR: 1.13          PTT - ( 02 Apr 2022 17:52 )  PTT:35.1 sec  Urinalysis Basic - ( 02 Apr 2022 20:35 )    Color: Yellow / Appearance: Clear / SG: >=1.030 / pH: x  Gluc: x / Ketone: NEGATIVE  / Bili: Negative / Urobili: 0.2 E.U./dL   Blood: x / Protein: 30 mg/dL / Nitrite: NEGATIVE   Leuk Esterase: NEGATIVE / RBC: < 5 /HPF / WBC < 5 /HPF   Sq Epi: x / Non Sq Epi: x / Bacteria: Present /HPF            Lactate, Blood: 1.4 mmol/L (04-02 @ 17:50)      RADIOLOGY, EKG & ADDITIONAL TESTS: Reviewed.

## 2022-04-02 NOTE — ED PROVIDER NOTE - OBJECTIVE STATEMENT
80 year old male, never smoker, with a PMHx hypothyroidism, SCC of the L supraglottic larynx treated w chemo/radiation in 2016 and recurred in the right soft palate, operated 1/2018, prostate ca c/o fever x 4 days, Tmax 102. Reports generalized fatigue and decreased appetite. Reports 2 episodes of loose stool today, nonbloody. Denies cp, sob, cough, n/v, abd pain, dysuria, bodyaches. Rapid covid neg yesterday.

## 2022-04-02 NOTE — H&P ADULT - NSHPPHYSICALEXAM_GEN_ALL_CORE
Vital Signs (24 Hrs):  T(C): 36.6 (04-02-22 @ 21:36), Max: 38.1 (04-02-22 @ 16:58)  HR: 61 (04-02-22 @ 21:36) (61 - 95)  BP: 126/65 (04-02-22 @ 21:36) (126/65 - 144/80)  RR: 16 (04-02-22 @ 21:36) (16 - 20)  SpO2: 97% (04-02-22 @ 21:36) (95% - 98%)  Wt(kg): --    PHYSICAL EXAM:  GENERAL: NAD  HEENT: No Lad, DMMM  CHEST/LUNG: Decreased breath sounds B/l  HEART: RRR. Systolic murmur.   ABDOMEN: NTND BS+4  EXTREMITIES:  2+ Pulses.  PSYCH: AAOx3, cooperative, appropriate  NEUROLOGY: AAOx3, CN II-XII intact. Strength 5/5 throughout. Sensation intact. Appropriate cerebellar functions.   SKIN: No rashes or lesions

## 2022-04-02 NOTE — ED PROVIDER NOTE - NS ED ATTENDING STATEMENT MOD
This was a shared visit with the DARWIN. I reviewed and verified the documentation and independently performed the documented:

## 2022-04-02 NOTE — ED ADULT TRIAGE NOTE - CHIEF COMPLAINT QUOTE
82 y/o male c/o fever for the past two days, weakness and diarrhea. Pt took a rapid covid test that was negative yesterday. Pt with hx of lung surgery.

## 2022-04-02 NOTE — ED PROVIDER NOTE - EKG #1 DATE/TIME
Paged and spoke with MD Tommie Gaitan regarding pt taking Plavix yesterday and not stopping it. She stated ok to proceed to OR.
02-Apr-2022 22:41

## 2022-04-02 NOTE — H&P ADULT - PROBLEM SELECTOR PLAN 3
hgb 10.8, baseline 11-12. No recent blood loss not on AC  - active type and screen  - trend cbc  - iron studies in am

## 2022-04-02 NOTE — H&P ADULT - ASSESSMENT
81M flu vaccinated, c hx hypothyroidism, SCC of the L supraglottic larynx treated w chemo/radiation in 2016 and recurred in the right soft palate, operated 1/2018. Non-keratinizing scc of of NATALIIA s/p NATALIIA lobectomy in 2020 and recent diagnosis of prostate cancer x1 year on leupron. Presenting with x2-3 days of malaise, fatigue, decreased PO intake and tmax at home 102 now admitted for sepsis 2/2 to influenza.

## 2022-04-02 NOTE — ED ADULT NURSE NOTE - OBJECTIVE STATEMENT
Patient arrives via wheelchair with wife for evaluation of fever x 3 days, decreased po intake, loose stool x 2.  Hx throat ca, lung surgery, planned to start radiation for prostate ca yesterday, but wife states "he was too sick to go."  Max temp 102F at home as per wife, no medications given.  Patient reporting fatigue and generalized weakness.  Rectal temp 100.6F.

## 2022-04-02 NOTE — ED ADULT NURSE NOTE - CHIEF COMPLAINT QUOTE
80 y/o male c/o fever for the past two days, weakness and diarrhea. Pt took a rapid covid test that was negative yesterday. Pt with hx of lung surgery.

## 2022-04-02 NOTE — H&P ADULT - PROBLEM SELECTOR PLAN 1
sepsis POA with bandemia, and HR > 90. source likely influenza, no cough, no cxr infiltrate, no rashes. Flu vaccinated.  - c/w osetalmavir 75mg BID  - f/u cultures  - f/u procal, low suspicion for super imposed bacterial infxn, hold off on abx for now

## 2022-04-03 ENCOUNTER — TRANSCRIPTION ENCOUNTER (OUTPATIENT)
Age: 82
End: 2022-04-03

## 2022-04-03 VITALS
TEMPERATURE: 99 F | DIASTOLIC BLOOD PRESSURE: 54 MMHG | SYSTOLIC BLOOD PRESSURE: 108 MMHG | OXYGEN SATURATION: 95 % | RESPIRATION RATE: 18 BRPM | HEART RATE: 101 BPM

## 2022-04-03 DIAGNOSIS — D64.9 ANEMIA, UNSPECIFIED: ICD-10-CM

## 2022-04-03 DIAGNOSIS — Z29.9 ENCOUNTER FOR PROPHYLACTIC MEASURES, UNSPECIFIED: ICD-10-CM

## 2022-04-03 DIAGNOSIS — J10.1 INFLUENZA DUE TO OTHER IDENTIFIED INFLUENZA VIRUS WITH OTHER RESPIRATORY MANIFESTATIONS: ICD-10-CM

## 2022-04-03 DIAGNOSIS — C61 MALIGNANT NEOPLASM OF PROSTATE: ICD-10-CM

## 2022-04-03 DIAGNOSIS — E87.3 ALKALOSIS: ICD-10-CM

## 2022-04-03 DIAGNOSIS — R01.1 CARDIAC MURMUR, UNSPECIFIED: ICD-10-CM

## 2022-04-03 DIAGNOSIS — E03.9 HYPOTHYROIDISM, UNSPECIFIED: ICD-10-CM

## 2022-04-03 LAB
ALBUMIN SERPL ELPH-MCNC: 3.1 G/DL — LOW (ref 3.3–5)
ALP SERPL-CCNC: 61 U/L — SIGNIFICANT CHANGE UP (ref 40–120)
ALT FLD-CCNC: 10 U/L — SIGNIFICANT CHANGE UP (ref 10–45)
ANION GAP SERPL CALC-SCNC: 10 MMOL/L — SIGNIFICANT CHANGE UP (ref 5–17)
AST SERPL-CCNC: 24 U/L — SIGNIFICANT CHANGE UP (ref 10–40)
B PERT DNA SPEC QL NAA+PROBE: SIGNIFICANT CHANGE UP
BASOPHILS # BLD AUTO: 0 K/UL — SIGNIFICANT CHANGE UP (ref 0–0.2)
BASOPHILS NFR BLD AUTO: 0 % — SIGNIFICANT CHANGE UP (ref 0–2)
BILIRUB SERPL-MCNC: 0.4 MG/DL — SIGNIFICANT CHANGE UP (ref 0.2–1.2)
BLD GP AB SCN SERPL QL: NEGATIVE — SIGNIFICANT CHANGE UP
BLD GP AB SCN SERPL QL: NEGATIVE — SIGNIFICANT CHANGE UP
BUN SERPL-MCNC: 20 MG/DL — SIGNIFICANT CHANGE UP (ref 7–23)
C PNEUM DNA SPEC QL NAA+PROBE: SIGNIFICANT CHANGE UP
CALCIUM SERPL-MCNC: 8.9 MG/DL — SIGNIFICANT CHANGE UP (ref 8.4–10.5)
CHLORIDE SERPL-SCNC: 96 MMOL/L — SIGNIFICANT CHANGE UP (ref 96–108)
CO2 SERPL-SCNC: 32 MMOL/L — HIGH (ref 22–31)
CREAT SERPL-MCNC: 0.91 MG/DL — SIGNIFICANT CHANGE UP (ref 0.5–1.3)
EGFR: 85 ML/MIN/1.73M2 — SIGNIFICANT CHANGE UP
EOSINOPHIL # BLD AUTO: 0 K/UL — SIGNIFICANT CHANGE UP (ref 0–0.5)
EOSINOPHIL NFR BLD AUTO: 0 % — SIGNIFICANT CHANGE UP (ref 0–6)
FERRITIN SERPL-MCNC: 465 NG/ML — HIGH (ref 30–400)
FLUAV H1 RNA SPEC QL NAA+PROBE: SIGNIFICANT CHANGE UP
FLUAV H3 RNA SPEC QL NAA+PROBE: SIGNIFICANT CHANGE UP
FLUAV SUBTYP SPEC NAA+PROBE: DETECTED
FLUBV RNA SPEC QL NAA+PROBE: SIGNIFICANT CHANGE UP
GLUCOSE SERPL-MCNC: 97 MG/DL — SIGNIFICANT CHANGE UP (ref 70–99)
HADV DNA SPEC QL NAA+PROBE: SIGNIFICANT CHANGE UP
HCOV PNL SPEC NAA+PROBE: SIGNIFICANT CHANGE UP
HCT VFR BLD CALC: 34.5 % — LOW (ref 39–50)
HGB BLD-MCNC: 11.1 G/DL — LOW (ref 13–17)
HMPV RNA SPEC QL NAA+PROBE: SIGNIFICANT CHANGE UP
HPIV1 RNA SPEC QL NAA+PROBE: SIGNIFICANT CHANGE UP
HPIV2 RNA SPEC QL NAA+PROBE: SIGNIFICANT CHANGE UP
HPIV3 RNA SPEC QL NAA+PROBE: SIGNIFICANT CHANGE UP
HPIV4 RNA SPEC QL NAA+PROBE: SIGNIFICANT CHANGE UP
IRON SATN MFR SERPL: 14 UG/DL — LOW (ref 45–165)
IRON SATN MFR SERPL: 9 % — LOW (ref 16–55)
LYMPHOCYTES # BLD AUTO: 0.27 K/UL — LOW (ref 1–3.3)
LYMPHOCYTES # BLD AUTO: 3.5 % — LOW (ref 13–44)
MAGNESIUM SERPL-MCNC: 1.9 MG/DL — SIGNIFICANT CHANGE UP (ref 1.6–2.6)
MANUAL SMEAR VERIFICATION: SIGNIFICANT CHANGE UP
MCHC RBC-ENTMCNC: 29.6 PG — SIGNIFICANT CHANGE UP (ref 27–34)
MCHC RBC-ENTMCNC: 32.2 GM/DL — SIGNIFICANT CHANGE UP (ref 32–36)
MCV RBC AUTO: 92 FL — SIGNIFICANT CHANGE UP (ref 80–100)
METAMYELOCYTES # FLD: 3.5 % — HIGH (ref 0–0)
MONOCYTES # BLD AUTO: 0.2 K/UL — SIGNIFICANT CHANGE UP (ref 0–0.9)
MONOCYTES NFR BLD AUTO: 2.6 % — SIGNIFICANT CHANGE UP (ref 2–14)
NEUTROPHILS # BLD AUTO: 7.02 K/UL — SIGNIFICANT CHANGE UP (ref 1.8–7.4)
NEUTROPHILS NFR BLD AUTO: 79.1 % — HIGH (ref 43–77)
NEUTS BAND # BLD: 11.3 % — HIGH (ref 0–8)
PHOSPHATE SERPL-MCNC: 2.6 MG/DL — SIGNIFICANT CHANGE UP (ref 2.5–4.5)
PLAT MORPH BLD: NORMAL — SIGNIFICANT CHANGE UP
PLATELET # BLD AUTO: 175 K/UL — SIGNIFICANT CHANGE UP (ref 150–400)
POTASSIUM SERPL-MCNC: 4 MMOL/L — SIGNIFICANT CHANGE UP (ref 3.5–5.3)
POTASSIUM SERPL-SCNC: 4 MMOL/L — SIGNIFICANT CHANGE UP (ref 3.5–5.3)
PROCALCITONIN SERPL-MCNC: 3.33 NG/ML — HIGH (ref 0.02–0.1)
PROT SERPL-MCNC: 6.5 G/DL — SIGNIFICANT CHANGE UP (ref 6–8.3)
RBC # BLD: 3.75 M/UL — LOW (ref 4.2–5.8)
RBC # FLD: 12.8 % — SIGNIFICANT CHANGE UP (ref 10.3–14.5)
RBC BLD AUTO: NORMAL — SIGNIFICANT CHANGE UP
RH IG SCN BLD-IMP: POSITIVE — SIGNIFICANT CHANGE UP
RH IG SCN BLD-IMP: POSITIVE — SIGNIFICANT CHANGE UP
RV+EV RNA SPEC QL NAA+PROBE: SIGNIFICANT CHANGE UP
RVP NOTIFY: SIGNIFICANT CHANGE UP
SODIUM SERPL-SCNC: 138 MMOL/L — SIGNIFICANT CHANGE UP (ref 135–145)
TIBC SERPL-MCNC: 152 UG/DL — LOW (ref 220–430)
TRANSFERRIN SERPL-MCNC: 127 MG/DL — LOW (ref 200–360)
UIBC SERPL-MCNC: 138 UG/DL — SIGNIFICANT CHANGE UP (ref 110–370)
WBC # BLD: 7.77 K/UL — SIGNIFICANT CHANGE UP (ref 3.8–10.5)
WBC # FLD AUTO: 7.77 K/UL — SIGNIFICANT CHANGE UP (ref 3.8–10.5)

## 2022-04-03 PROCEDURE — 36415 COLL VENOUS BLD VENIPUNCTURE: CPT

## 2022-04-03 PROCEDURE — 81001 URINALYSIS AUTO W/SCOPE: CPT

## 2022-04-03 PROCEDURE — 0225U NFCT DS DNA&RNA 21 SARSCOV2: CPT

## 2022-04-03 PROCEDURE — 83605 ASSAY OF LACTIC ACID: CPT

## 2022-04-03 PROCEDURE — 99239 HOSP IP/OBS DSCHRG MGMT >30: CPT

## 2022-04-03 PROCEDURE — 83540 ASSAY OF IRON: CPT

## 2022-04-03 PROCEDURE — 87040 BLOOD CULTURE FOR BACTERIA: CPT

## 2022-04-03 PROCEDURE — 83735 ASSAY OF MAGNESIUM: CPT

## 2022-04-03 PROCEDURE — 96374 THER/PROPH/DIAG INJ IV PUSH: CPT

## 2022-04-03 PROCEDURE — 99285 EMERGENCY DEPT VISIT HI MDM: CPT | Mod: 25

## 2022-04-03 PROCEDURE — 86900 BLOOD TYPING SEROLOGIC ABO: CPT

## 2022-04-03 PROCEDURE — 87086 URINE CULTURE/COLONY COUNT: CPT

## 2022-04-03 PROCEDURE — 84466 ASSAY OF TRANSFERRIN: CPT

## 2022-04-03 PROCEDURE — 84100 ASSAY OF PHOSPHORUS: CPT

## 2022-04-03 PROCEDURE — 86850 RBC ANTIBODY SCREEN: CPT

## 2022-04-03 PROCEDURE — 82728 ASSAY OF FERRITIN: CPT

## 2022-04-03 PROCEDURE — 84145 PROCALCITONIN (PCT): CPT

## 2022-04-03 PROCEDURE — 83550 IRON BINDING TEST: CPT

## 2022-04-03 PROCEDURE — 85730 THROMBOPLASTIN TIME PARTIAL: CPT

## 2022-04-03 PROCEDURE — 85025 COMPLETE CBC W/AUTO DIFF WBC: CPT

## 2022-04-03 PROCEDURE — 86901 BLOOD TYPING SEROLOGIC RH(D): CPT

## 2022-04-03 PROCEDURE — 71046 X-RAY EXAM CHEST 2 VIEWS: CPT

## 2022-04-03 PROCEDURE — 85610 PROTHROMBIN TIME: CPT

## 2022-04-03 PROCEDURE — 80053 COMPREHEN METABOLIC PANEL: CPT

## 2022-04-03 RX ORDER — DOXAZOSIN MESYLATE 4 MG
2 TABLET ORAL AT BEDTIME
Refills: 0 | Status: DISCONTINUED | OUTPATIENT
Start: 2022-04-03 | End: 2022-04-03

## 2022-04-03 RX ORDER — ENOXAPARIN SODIUM 100 MG/ML
40 INJECTION SUBCUTANEOUS EVERY 24 HOURS
Refills: 0 | Status: DISCONTINUED | OUTPATIENT
Start: 2022-04-03 | End: 2022-04-03

## 2022-04-03 RX ORDER — MAGNESIUM SULFATE 500 MG/ML
1 VIAL (ML) INJECTION ONCE
Refills: 0 | Status: COMPLETED | OUTPATIENT
Start: 2022-04-03 | End: 2022-04-03

## 2022-04-03 RX ADMIN — Medication 100 GRAM(S): at 11:48

## 2022-04-03 RX ADMIN — Medication 75 MILLIGRAM(S): at 11:47

## 2022-04-03 NOTE — DISCHARGE NOTE PROVIDER - ATTENDING DISCHARGE PHYSICAL EXAMINATION:
VITALS  Vital Signs Last 24 Hrs  T(C): 36.6 (03 Apr 2022 00:06), Max: 38.1 (02 Apr 2022 16:58)  T(F): 97.8 (03 Apr 2022 00:06), Max: 100.6 (02 Apr 2022 16:58)  HR: 63 (03 Apr 2022 00:06) (61 - 95)  BP: 128/78 (03 Apr 2022 00:06) (126/65 - 144/80)  BP(mean): --  RR: 16 (03 Apr 2022 00:06) (16 - 20)  SpO2: 96% (03 Apr 2022 00:06) (95% - 98%)    I&O's Summary    02 Apr 2022 07:01  -  03 Apr 2022 07:00  --------------------------------------------------------  IN: 0 mL / OUT: 200 mL / NET: -200 mL        CAPILLARY BLOOD GLUCOSE          PHYSICAL EXAM  General: A&Ox3; NAD  Head: NC/AT; PERRL; EOMI; anicteric sclera  Neck: Supple; no JVD  Respiratory: CTA B/L; no wheezes/crackles/rales auscultated w/ good air movement  Cardiovascular: Regular rhythm/rate; S1/S2; no gallops or murmurs auscultated  Gastrointestinal: Soft; NTND w/out rebound tenderness or guarding; bowel sounds normal  Extremities: WWP; no edema or cyanosis; radial/pedal pulses palpable  Neurological:  CNII-XII grossly intact; no obvious focal deficits  Skin: No rashes noted  Vasc: +2 DP/PT pulses b/l   Psych: Appropriate Affect

## 2022-04-03 NOTE — DISCHARGE NOTE PROVIDER - HOSPITAL COURSE
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 Non-keratinizing scc of of NATALIIA s/p NATALIIA lobectomy in 2020 and recent diagnosis of prostate cancer x1 year on leupron. Presenting with x2-3 days of malaise, fatigue, decreased PO intake and tmax at home 102 now admitted for sepsis 2/2 to influenza complicated by likely  superimposed bacterial PNA with elevated procal 3.33. Pt received oseltamivir and will be sent home on 5 day course of Augmentin.          Problem List/Main Diagnoses (system-based):    #Sepsis 2/2 influenza and superimposed bacterial PNA  Sepsis POA with bandemia, and HR > 90. Flu vaccinated.  - c/w Oseltamivir 75mg BID till 4/7, 5 day course.  - procalcitonin elevated 3.33, send home on Augment 5 day course.     # Anemia.   - hgb 10.8, baseline 11-12. No recent blood loss not on AC    # Metabolic alkalosis.  - Slowly increasing bicarb since last admission  - ddx: includes possible chronic CO2 retention given vbg with evidence of retention and smoking history vs. GI losses. Patient mentating at baseline.     #Hypothyroid.   - c/w home synthroid 25mcg.    #prostate ca  - follows with dr. briseno.  - c/w hyatin 2mg.    # Heart murmur.   - New murmur not documented previously.   - can consider outpatient echo.       Inpatient treatment course: Please see as above.          Patient was discharged to: home         New medications: Oseltamivir 75mg BID x 5 days, Augmentin 500 BID x5 days    Changes to old medications: None.     Medications that were stopped: None.          Items to follow up as outpatient: Heart murmur, PCP follow up.          Physical exam at the time of discharge:  PHYSICAL EXAM:  GENERAL: NAD  HEENT: No LAD, MMM  CHEST/LUNG: Decreased breath sounds B/l  HEART: RRR. Systolic murmur.   ABDOMEN: NTND BS+4  EXTREMITIES:  2+ Pulses.  PSYCH: AAOx3, cooperative, appropriate  NEUROLOGY: AAOx3, CN II-XII intact. Strength 5/5 throughout. Sensation intact. Appropriate cerebellar functions.   SKIN: No rashes or lesions

## 2022-04-03 NOTE — DISCHARGE NOTE PROVIDER - NSDCCPCAREPLAN_GEN_ALL_CORE_FT
PRINCIPAL DISCHARGE DIAGNOSIS  Diagnosis: Influenza A  Assessment and Plan of Treatment: The flu is an infection that can cause fever, cough, body aches, and other symptoms. The most common type of flu is the "seasonal" flu. There are different forms of seasonal flu, for example, "type A" and "type B." You have type A. All forms of the flu are caused by viruses. The medical term for the flu is "influenza."  Most people with the flu get better on their own within 1 to 2 weeks. But you should call your doctor or nurse if you:  - Have trouble breathing or are short of breath  - Feel pain or pressure in your chest or belly  - Get suddenly dizzy  - Feel confused  - Have severe vomiting  Please continue taking  your olseltamivir 75mg twice a day tablet till 4/7.  Please call your PCP and make an appointment in 2 weeks to be re-evaluated.      SECONDARY DISCHARGE DIAGNOSES  Diagnosis: Bacterial pneumonia  Assessment and Plan of Treatment: Pneumonia is a lung infection that can cause coughing, fever, and trouble breathing. The lung infection is often caused by bacteria, but it can also be caused by viruses or other germs. Common symptoms include: Cough – People sometimes cough up mucus (sputum), Fever, Chest pain, especially when taking a deep breath, A fast heartbeat, Shaking chills.  While you were in the hospital you were noted to have a bacterial pneumonia as well. Because of this you will need to be on a 5 day antibiotic course of Augment 500mg twice a day.

## 2022-04-03 NOTE — DISCHARGE NOTE NURSING/CASE MANAGEMENT/SOCIAL WORK - NSDCPEFALRISK_GEN_ALL_CORE
For information on Fall & Injury Prevention, visit: [email or web address] OR  [email or web address] OR  https://www.cdc.gov/steadi/patient.html

## 2022-04-03 NOTE — DISCHARGE NOTE PROVIDER - NSDCFUADDAPPT_GEN_ALL_CORE_FT
Please call your PCP Dr. Hendrix and schedule an appointment in 2 weeks to follow up on your flu and pneumonia.

## 2022-04-03 NOTE — DISCHARGE NOTE PROVIDER - NSDCMRMEDTOKEN_GEN_ALL_CORE_FT
acetaminophen 325 mg oral tablet: 2 tab(s) orally every 6 hours, As needed, Moderate Pain (4 - 6)  bicalutamide 50 mg oral tablet: 1 tab(s) orally every 24 hours  Hytrin 2 mg oral capsule: 1 cap(s) orally once a day (at bedtime)  Lupron:   polyethylene glycol 3350 oral powder for reconstitution: 17 gram(s) orally once a day, As Needed -for constipation     Hold for loose stool  senna 17 mg oral tablet: 2 tab(s) orally once a day, As Needed -for constipation   Synthroid 25 mcg (0.025 mg) oral tablet: 1 tab(s) orally once a day  Toprol-XL 25 mg oral tablet, extended release: 1 tab(s) orally once a day   acetaminophen 325 mg oral tablet: 2 tab(s) orally every 6 hours, As needed, Moderate Pain (4 - 6)  Augmentin 500 mg-125 mg oral tablet: 1 tab(s) orally 2 times a day   bicalutamide 50 mg oral tablet: 1 tab(s) orally every 24 hours  Hytrin 2 mg oral capsule: 1 cap(s) orally once a day (at bedtime)  Lupron:   oseltamivir 75 mg oral capsule: 1 cap(s) orally 2 times a day   polyethylene glycol 3350 oral powder for reconstitution: 17 gram(s) orally once a day, As Needed -for constipation     Hold for loose stool  senna 17 mg oral tablet: 2 tab(s) orally once a day, As Needed -for constipation   Synthroid 25 mcg (0.025 mg) oral tablet: 1 tab(s) orally once a day  Toprol-XL 25 mg oral tablet, extended release: 1 tab(s) orally once a day

## 2022-04-03 NOTE — PROGRESS NOTE ADULT - PROBLEM SELECTOR PLAN 4
RESOLVED     slowly increasing bicarb since last admission ddx: includes possible chronic CO2 retention given vbg with evidence of retention and smoking history vs. GI losses. Patient mentating  - will send for urine chloride

## 2022-04-03 NOTE — PROGRESS NOTE ADULT - PROBLEM SELECTOR PLAN 1
RESOLVED     sepsis POA with bandemia, and HR > 90. source likely influenza, no cough, no cxr infiltrate, no rashes. Flu vaccinated.  - c/w osetalmavir 75mg BID for 5 days   - blood cultures and urine cultures NGTD   - pro daniel elevated concerning for bacterial superinfection, will treat w/ 5 day course of augmentin

## 2022-04-03 NOTE — DISCHARGE NOTE PROVIDER - CARE PROVIDER_API CALL
TATIANNA LEYVA  Internal Medicine  [address] [place] [address], [place] [address]  Phone: [phone]  Fax: [phone]  Follow Up Time: 2 weeks

## 2022-04-03 NOTE — DISCHARGE NOTE PROVIDER - NSDCFUSCHEDAPPT_GEN_ALL_CORE_FT
UMA FARLEY ; 04/14/2022 ; NPP HemOnc [address]   UMA FARLEY ; 05/18/2022 ; SATYA Otolaryng [address] [facility] [address]

## 2022-04-03 NOTE — PATIENT PROFILE ADULT - FALL HARM RISK - HARM RISK INTERVENTIONS

## 2022-04-03 NOTE — PROGRESS NOTE ADULT - SUBJECTIVE AND OBJECTIVE BOX
OVERNIGHT EVENTS:    SUBJECTIVE / INTERVAL HPI: Patient seen and examined at bedside.     VITAL SIGNS:  Vital Signs Last 24 Hrs  T(C): 36.6 (03 Apr 2022 00:06), Max: 38.1 (02 Apr 2022 16:58)  T(F): 97.8 (03 Apr 2022 00:06), Max: 100.6 (02 Apr 2022 16:58)  HR: 63 (03 Apr 2022 00:06) (61 - 95)  BP: 128/78 (03 Apr 2022 00:06) (126/65 - 144/80)  BP(mean): --  RR: 16 (03 Apr 2022 00:06) (16 - 20)  SpO2: 96% (03 Apr 2022 00:06) (95% - 98%)  I&O's Summary    02 Apr 2022 07:01  -  03 Apr 2022 06:49  --------------------------------------------------------  IN: 0 mL / OUT: 200 mL / NET: -200 mL        PHYSICAL EXAM:    General: NAD, lying comfortably in bed   HEENT: Anicteric sclera, EOMI, MMM  Cardiovascular: +S1/S2; RRR; No JVD   Respiratory: CTAB, no accessory muscle use  Gastrointestinal: soft, non-distended, no tenderness to palpation  Extremities: WWP; no edema, no erythema, no tenderness to palpation  Vascular: 2+ radial, DP/PT pulses B/L  Neurological: AAOx3; no focal deficits  Skin: No visible rash, skin discoloration    MEDICATIONS:  MEDICATIONS  (STANDING):  doxazosin 2 milliGRAM(s) Oral at bedtime  enoxaparin Injectable 40 milliGRAM(s) SubCutaneous every 24 hours  oseltamivir 75 milliGRAM(s) Oral two times a day    MEDICATIONS  (PRN):  acetaminophen     Tablet .. 650 milliGRAM(s) Oral every 6 hours PRN Temp greater or equal to 38C (100.4F)      ALLERGIES:  Allergies    bactroban (Other; Rash)  hydrogen peroxide (Rash)  latex (Rash)    Intolerances        LABS:                        11.1   7.77  )-----------( 175      ( 03 Apr 2022 06:02 )             34.5     04-03    138  |  96  |  20  ----------------------------<  97  4.0   |  32<H>  |  0.91    Ca    8.9      03 Apr 2022 06:02  Phos  2.6     04-03  Mg     1.9     04-03    TPro  6.5  /  Alb  3.1<L>  /  TBili  0.4  /  DBili  x   /  AST  24  /  ALT  10  /  AlkPhos  61  04-03    PT/INR - ( 02 Apr 2022 17:52 )   PT: 13.5 sec;   INR: 1.13          PTT - ( 02 Apr 2022 17:52 )  PTT:35.1 sec  Urinalysis Basic - ( 02 Apr 2022 20:35 )    Color: Yellow / Appearance: Clear / SG: >=1.030 / pH: x  Gluc: x / Ketone: NEGATIVE  / Bili: Negative / Urobili: 0.2 E.U./dL   Blood: x / Protein: 30 mg/dL / Nitrite: NEGATIVE   Leuk Esterase: NEGATIVE / RBC: < 5 /HPF / WBC < 5 /HPF   Sq Epi: x / Non Sq Epi: x / Bacteria: Present /HPF      CAPILLARY BLOOD GLUCOSE          RADIOLOGY & ADDITIONAL TESTS: Reviewed.   OVERNIGHT EVENTS: No acute events overnight. Pt remained afebrile and hemodynamically stable.      SUBJECTIVE / INTERVAL HPI: Patient seen and examined at bedside. Endorses mild cough. Continue to have poor appetite. No additional complaints.     ROS: 12 pt ROS otherwise negative unless stated above.       VITAL SIGNS:  Vital Signs Last 24 Hrs  T(C): 36.6 (03 Apr 2022 00:06), Max: 38.1 (02 Apr 2022 16:58)  T(F): 97.8 (03 Apr 2022 00:06), Max: 100.6 (02 Apr 2022 16:58)  HR: 63 (03 Apr 2022 00:06) (61 - 95)  BP: 128/78 (03 Apr 2022 00:06) (126/65 - 144/80)  BP(mean): --  RR: 16 (03 Apr 2022 00:06) (16 - 20)  SpO2: 96% (03 Apr 2022 00:06) (95% - 98%)  I&O's Summary    02 Apr 2022 07:01  -  03 Apr 2022 06:49  --------------------------------------------------------  IN: 0 mL / OUT: 200 mL / NET: -200 mL        PHYSICAL EXAM:    General: NAD, lying comfortably in bed   HEENT: Anicteric sclera, EOMI, MMM  Cardiovascular: +S1/S2; RRR; No JVD   Respiratory: CTAB, no accessory muscle use  Gastrointestinal: soft, non-distended, no tenderness to palpation  Extremities: WWP; no edema, no erythema, no tenderness to palpation  Vascular: 2+ radial, DP/PT pulses B/L  Neurological: AAOx3; no focal deficits  Skin: No visible rash, skin discoloration    MEDICATIONS:  MEDICATIONS  (STANDING):  doxazosin 2 milliGRAM(s) Oral at bedtime  enoxaparin Injectable 40 milliGRAM(s) SubCutaneous every 24 hours  oseltamivir 75 milliGRAM(s) Oral two times a day    MEDICATIONS  (PRN):  acetaminophen     Tablet .. 650 milliGRAM(s) Oral every 6 hours PRN Temp greater or equal to 38C (100.4F)      ALLERGIES:  Allergies    bactroban (Other; Rash)  hydrogen peroxide (Rash)  latex (Rash)    Intolerances        LABS:                        11.1   7.77  )-----------( 175      ( 03 Apr 2022 06:02 )             34.5     04-03    138  |  96  |  20  ----------------------------<  97  4.0   |  32<H>  |  0.91    Ca    8.9      03 Apr 2022 06:02  Phos  2.6     04-03  Mg     1.9     04-03    TPro  6.5  /  Alb  3.1<L>  /  TBili  0.4  /  DBili  x   /  AST  24  /  ALT  10  /  AlkPhos  61  04-03    PT/INR - ( 02 Apr 2022 17:52 )   PT: 13.5 sec;   INR: 1.13          PTT - ( 02 Apr 2022 17:52 )  PTT:35.1 sec  Urinalysis Basic - ( 02 Apr 2022 20:35 )    Color: Yellow / Appearance: Clear / SG: >=1.030 / pH: x  Gluc: x / Ketone: NEGATIVE  / Bili: Negative / Urobili: 0.2 E.U./dL   Blood: x / Protein: 30 mg/dL / Nitrite: NEGATIVE   Leuk Esterase: NEGATIVE / RBC: < 5 /HPF / WBC < 5 /HPF   Sq Epi: x / Non Sq Epi: x / Bacteria: Present /HPF      CAPILLARY BLOOD GLUCOSE          RADIOLOGY & ADDITIONAL TESTS: Reviewed.

## 2022-04-03 NOTE — DISCHARGE NOTE NURSING/CASE MANAGEMENT/SOCIAL WORK - PATIENT PORTAL LINK FT
You can access the FollowMyHealth Patient Portal offered by [facility] by registering at the following website: [email or web address]. By joining [employer]’s FollowMyHealth portal, you will also be able to view your health information using other applications (apps) compatible with our system.

## 2022-04-04 LAB
CULTURE RESULTS: NO GROWTH — SIGNIFICANT CHANGE UP
SPECIMEN SOURCE: SIGNIFICANT CHANGE UP

## 2022-04-07 ENCOUNTER — OUTPATIENT (OUTPATIENT)
Dept: OUTPATIENT SERVICES | Facility: HOSPITAL | Age: 82
LOS: 1 days | End: 2022-04-07
Payer: MEDICARE

## 2022-04-07 DIAGNOSIS — Z41.9 ENCOUNTER FOR PROCEDURE FOR PURPOSES OTHER THAN REMEDYING HEALTH STATE, UNSPECIFIED: Chronic | ICD-10-CM

## 2022-04-07 DIAGNOSIS — Z90.89 ACQUIRED ABSENCE OF OTHER ORGANS: Chronic | ICD-10-CM

## 2022-04-07 DIAGNOSIS — C32.3 MALIGNANT NEOPLASM OF LARYNGEAL CARTILAGE: Chronic | ICD-10-CM

## 2022-04-07 DIAGNOSIS — Z90.49 ACQUIRED ABSENCE OF OTHER SPECIFIED PARTS OF DIGESTIVE TRACT: Chronic | ICD-10-CM

## 2022-04-07 LAB
CULTURE RESULTS: SIGNIFICANT CHANGE UP
CULTURE RESULTS: SIGNIFICANT CHANGE UP
POCT ISTAT CREATININE: 0.8 MG/DL — SIGNIFICANT CHANGE UP (ref 0.5–1.3)
SPECIMEN SOURCE: SIGNIFICANT CHANGE UP
SPECIMEN SOURCE: SIGNIFICANT CHANGE UP

## 2022-04-07 PROCEDURE — 71260 CT THORAX DX C+: CPT | Mod: ME

## 2022-04-07 PROCEDURE — 82565 ASSAY OF CREATININE: CPT

## 2022-04-07 PROCEDURE — G1004: CPT

## 2022-04-07 PROCEDURE — 71260 CT THORAX DX C+: CPT | Mod: 26,ME

## 2022-04-12 ENCOUNTER — APPOINTMENT (OUTPATIENT)
Dept: CT IMAGING | Facility: CLINIC | Age: 82
End: 2022-04-12

## 2022-04-13 DIAGNOSIS — Z88.1 ALLERGY STATUS TO OTHER ANTIBIOTIC AGENTS STATUS: ICD-10-CM

## 2022-04-13 DIAGNOSIS — J15.9 UNSPECIFIED BACTERIAL PNEUMONIA: ICD-10-CM

## 2022-04-13 DIAGNOSIS — Z92.21 PERSONAL HISTORY OF ANTINEOPLASTIC CHEMOTHERAPY: ICD-10-CM

## 2022-04-13 DIAGNOSIS — Z88.8 ALLERGY STATUS TO OTHER DRUGS, MEDICAMENTS AND BIOLOGICAL SUBSTANCES STATUS: ICD-10-CM

## 2022-04-13 DIAGNOSIS — Z87.891 PERSONAL HISTORY OF NICOTINE DEPENDENCE: ICD-10-CM

## 2022-04-13 DIAGNOSIS — Z85.818 PERSONAL HISTORY OF MALIGNANT NEOPLASM OF OTHER SITES OF LIP, ORAL CAVITY, AND PHARYNX: ICD-10-CM

## 2022-04-13 DIAGNOSIS — Z85.21 PERSONAL HISTORY OF MALIGNANT NEOPLASM OF LARYNX: ICD-10-CM

## 2022-04-13 DIAGNOSIS — E87.3 ALKALOSIS: ICD-10-CM

## 2022-04-13 DIAGNOSIS — Z91.040 LATEX ALLERGY STATUS: ICD-10-CM

## 2022-04-13 DIAGNOSIS — R01.1 CARDIAC MURMUR, UNSPECIFIED: ICD-10-CM

## 2022-04-13 DIAGNOSIS — J10.1 INFLUENZA DUE TO OTHER IDENTIFIED INFLUENZA VIRUS WITH OTHER RESPIRATORY MANIFESTATIONS: ICD-10-CM

## 2022-04-13 DIAGNOSIS — A41.9 SEPSIS, UNSPECIFIED ORGANISM: ICD-10-CM

## 2022-04-13 DIAGNOSIS — R19.7 DIARRHEA, UNSPECIFIED: ICD-10-CM

## 2022-04-13 DIAGNOSIS — Z79.890 HORMONE REPLACEMENT THERAPY: ICD-10-CM

## 2022-04-13 DIAGNOSIS — Z20.822 CONTACT WITH AND (SUSPECTED) EXPOSURE TO COVID-19: ICD-10-CM

## 2022-04-13 DIAGNOSIS — E03.9 HYPOTHYROIDISM, UNSPECIFIED: ICD-10-CM

## 2022-04-13 DIAGNOSIS — Z92.3 PERSONAL HISTORY OF IRRADIATION: ICD-10-CM

## 2022-04-13 DIAGNOSIS — R50.9 FEVER, UNSPECIFIED: ICD-10-CM

## 2022-04-13 DIAGNOSIS — D64.9 ANEMIA, UNSPECIFIED: ICD-10-CM

## 2022-04-13 DIAGNOSIS — Z79.899 OTHER LONG TERM (CURRENT) DRUG THERAPY: ICD-10-CM

## 2022-04-13 DIAGNOSIS — Z85.118 PERSONAL HISTORY OF OTHER MALIGNANT NEOPLASM OF BRONCHUS AND LUNG: ICD-10-CM

## 2022-04-13 DIAGNOSIS — Z90.2 ACQUIRED ABSENCE OF LUNG [PART OF]: ICD-10-CM

## 2022-04-13 DIAGNOSIS — C61 MALIGNANT NEOPLASM OF PROSTATE: ICD-10-CM

## 2022-04-15 ENCOUNTER — NON-APPOINTMENT (OUTPATIENT)
Age: 82
End: 2022-04-15

## 2022-04-21 NOTE — ASSESSMENT
[FreeTextEntry1] : Mr. Franklin is an 81 year old male, never smoker, with history of hypothyroidism, SCC of the L supraglottic larynx treated w chemo(weekly cisplatin given by Dr. Dowell)/radiation in 2016 and recurred in the rightsoft palate, operated 1/2018 and completed RT 4/ 2018, NATALIIA SCC, s/p left upper lobectomy on 10/26/20 who presents to clinic today to discuss about recent CT scans concerning of prostate malignancy.\par \par Plan\par # Prostate mass r/o prostate malignancy\par -continue bicalutamide and lupron ingection 22.5mg q 3 months  next dose due 4/14/22 \par -RT with Dr. Gardner \par -f/u PSA today \par \par -check PSA and testosterone levels today \par -f/u  Radiation Oncology (Dr. Gardner) \par -f/u Urology at [facility] \par \par #  NATALIIA SCC, s/p left upper lobectomy on 10/26/20 \par -CT CHest ordered and will f/u at next appt 4/22 \par \par #SCC of the L supraglottic larynx,s/p CT/RT in 2016, recurred in the right soft palate, operated in 1/2018 and completed RT in 4/ 2018\par -CHEY \par -Continue to f/u with Dr. Snow\par -Continue daily neck exercises \par \par # dysphagia/odynophagia could be 2/2 post RT strictures, achalasia, etc.. \par -Pt to follow up with his GI doc \par -discussed the differential dx and w/u (EGD) and possible dilitatation.  \par \par # Health maintenance \par -Colonoscopy as per PCP\par -Received covid vaccine\par \par RTC 4/14/22 \par spent 40 min with patient

## 2022-04-21 NOTE — HISTORY OF PRESENT ILLNESS
[de-identified] : Mr. Franklin is an 81 year old male, never smoker, with history of hypothyroidism, SCC of the L supraglottic larynx treated w chemo(weekly cisplatin given by Dr. Dowell.)/radiation in 2016 and recurred in the right soft palate, operated 1/2018 and completed RT 4/ 2018, NATALIIA SCC, s/p left upper lobectomy on 10/26/20 who presents to clinic today to discuss about recent CT scans concerning of prostate malignancy.\par \par ONC Hx: \par Mr. 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 He was monitored every 3 months in [place] by Dr. Álvarez and also in [place] by Dr. Bao Sanders. His visit to Dr. Leon in February 2016 was notable for a concerning lesion arising at the right vallecula/right lateral pharyngeal wall. This was separate from the previously treated lesion in the left AE fold that had been removed by laser. On March 2, 2016, Dr. Leon biopsied the right vallecula which showed squamous proliferative papillary lesion with high-grade dysplasia, and the left aryepiglottic fold which showed squamous cell carcinoma. Pathology review at [facility] confirmed the diagnosis of SCC in the left AE fold and squamous dysplasia with high-grade features in the right vallecula. PET/CT scan showed a maximum SUV of 9.2 in the right vallecular area as well as a 1.6 cm left cervical lymph node with maximum SUV of 9.8. FNA of the left neck node on 4/7/16 showed SCC.  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  He was found to have recurrence in the Rt soft palate, Opx 1/2018, and completed 7000 cGy to the oropharynx with Dr. Pandya from 3/12/18-4/27/18, without chemotherapy. Trouble swallowing since that time, eats a normal diet but sometimes aspirates and coughs. 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 He underwent left upper lobectomy with Dr. Mackey on 10/26/20 showing 3.5cm NATALIIA SCC, negative margins, compatible with metastatic SCC.  Observation since then.  He has been following with Dr. Snow and has been CHEY of supraglottic larynx cancer. He has been following with Dr. Briscoe for enlarged prostate and underwent MRI prostate on 6/2/21 due to recent elevated PSA. MRI Prostate showed 10x9 mm, 8x7mm right posteromedial midgland peripheral zone lesion.  CT CAP on 6/17/21 showed enlarged, lobular and heterogeneous prostate. Increase in size of sclerotic lesion right ilium. Prostate cancer should be ruled out.\par \par FH- Mother with breast cancer, Father with larynx cancer\par Last colonoscopy and endoscopy was 4-5 years ago, not remarkable as per patient. \par \par 10/26/20\par Surgical Final Report\par 1. Left level 10 lymph node:\par - One lymph node negative for tumor (0/1).\par 2. Left lower 11 lymph node:\par - One lymph node negative for tumor (0/1).\par 3. Bronchial margin:\par - Bronchial margin negative for tumor.\par 4. Left upper lobe:\par - Lung with squamous cell carcinoma, 3.5 cm. , compatible with metastatic squamous cell carcinoma ( prior history of oropharyngeal squamous cell carcinoma.\par - Parenchymal and vascular margins negative for tumor.\par - Remaining lung with a microscopic tumorlet and foci of pleural and subpleural fibrosis.\par - Nine peribronchial lymph nodes negative for tumor (0/9).\par 5.  AP window lymph node:\par - Two lymph nodes negative for tumor (0/2)\par 6. Level 8 lymph node:\par - One lymph node negative for tumor (0/1).\par 7. Subcarinal lymph node:\par - One lymph node negative for tumor (0/1).\par \par 2/12/21 CT Chest\par Interval left upper lobectomy without suspicious focal abnormality. New moderate left pleural effusion.\par \par 2/26/21 Left-sided thoracentesis\par 700 cc cc of fluid were drained\par Final Diagnosis\par LEFT PLEURAL FLUID, THORACENTESIS:\par NONDIAGNOSTIC\par The specimen is composed of blood only.\par \par 3/16/21 PET/CT\par 1.  Small left pleural effusion without increased FDG uptake.\par 2.  Post left upper lobectomy. No FDG avid pulmonary nodules.\par \par 6/2/21 MRI Prostate \par 1.10x9 mm right posteromedial midgland(extending to base) peripheral zone lesion. PI-RADS 4, high(clinically significant cancer likely). No extra prostatic extension. \par 2.8x7mm right posteromedial midgland (extending to apex) peripheral zone lesion. PI-RADS 4, high(clinically significant cancer likely). Lesion abuts capsule without extra prostatic extension. \par 3.No seminal vesical invasion or pelvic lymphadenopathy \par 4.Indeterminate right sacral lesion. Recommend bone scans for further evaluation\par 5.Partial visualization of small to moderate volume fluid in the lower abdomen, unkown etiology. Consider dedicated CT of the abdomen and pelvis for further evaluation. \par 6.Partial visualization of distal left hydroureter. This could be evaluated at time of CT abdomen and pelvis as well. \par \par 6/17/21 CT CAP\par 1. Since 3/16/2021, there has been a decrease in size of a small left pleural effusion. Left pleural thickening again noted.\par 2. New small to moderate ascites in the pelvis, of uncertain etiology.\par 3. New mild left hydronephrosis, the cause of which is not seen.\par 4. Enlarged, lobular and heterogeneous prostate. Increase in size of sclerotic lesion right ilium. Prostate cancer should be ruled out.\par 5. Bladder wall thickening. This could be due to chronic bladder outlet obstruction versus infection.\par 6. No change mild retroperitoneal lymphadenopathy.\par 7. There are two cystic lesions in the pancreas. Recommend correlation with MRI of pancreas.\par 8. No change lung nodules. Continued follow-up recommended.\par \par 3/23/22 MRI PROSTATE WITH AND WITHOUT CONTRAST\par Minimal extracapsular extension of disease posterior right prostate gland. This is however  from the anterior wall of rectum by the spacer material. Key images are provided for your review.\par \par 4/7/22 CT CHEST IC\par 1.  Since 10/12/2021, interval development of small and large airways inflammation characterized by bronchial wall thickening and tree-in-bud micronodular opacities most pronounced within the lower lung zones. Chronic infectious and/or inflammatory etiologies cannot be excluded in view of the lower lung zone predilection, the possibility of chronic aspiration pneumonia cannot be excluded.\par 2.  New patchy groundglass opacities some demonstrating a mosaic attenuation pattern (5:139) within the right lower lobe. These may represent areas of air trapping from underlying small airways inflammation however an early multifocal pneumonitis cannot be excluded. Short interval surveillance is suggested to confirm stability after therapeutic administration.\par 3.  Interval development of bilateral solid pulmonary nodules measuring up to 1.4 cm. These may be infectious and/or inflammatory in etiology however neoplasia cannot be excluded. Further evaluation with tissue sampling, PET/CT, or repeat CT chest in 3 months can be considered.\par 4.  Increasing mild mediastinal lymphadenopathy which may be reactive. Largest lymph node measuring 11 mm in the right suprahilar region.\par 5.  Long segment circumferential wall thickening of the esophagus, likely esophagitis, possibly the sequela of radiation.\par 6.  Status post left upper lobectomy with stable postsurgical change..\par 7.  Hepatic steatosis. [de-identified] : Patient returns from [place] where he had lupron injection 1/16/22.  No new complaints and still has fatigue but denies hot flashes.  Has been simulated for RT to prostate.  No CT Chest available.  Also, patient c/o difficulty swallowing and has lost approx 5-10 lbs over the last 6 months.  States that food gets stuck but he has not had regurgitate but just eat more slowly and nothing dry.  Patient has a GI doc he'd like to see.  Denies any lumps or bumps.

## 2022-04-22 ENCOUNTER — NON-APPOINTMENT (OUTPATIENT)
Age: 82
End: 2022-04-22

## 2022-04-26 ENCOUNTER — NON-APPOINTMENT (OUTPATIENT)
Age: 82
End: 2022-04-26

## 2022-04-26 NOTE — HISTORY OF PRESENT ILLNESS
[FreeTextEntry1] : OTV note:  500/7000cGy to the prostate and proximal seminal vesicles.  He is doing well, with no untoward side effects from treatment.  He notes trivial change in baseline urinary urgency.  We once again reviewed the potential use of alpha blocker medication, though he notes his urinary habits aren't appreciably changed.  no bowel changes. we discussed treatment while supine as he has some baseline back pain.  will continue RT as planned.

## 2022-04-28 ENCOUNTER — APPOINTMENT (OUTPATIENT)
Age: 82
End: 2022-04-28

## 2022-04-28 ENCOUNTER — APPOINTMENT (OUTPATIENT)
Dept: HEMATOLOGY ONCOLOGY | Facility: CLINIC | Age: 82
End: 2022-04-28
Payer: MEDICARE

## 2022-05-04 ENCOUNTER — NON-APPOINTMENT (OUTPATIENT)
Age: 82
End: 2022-05-04

## 2022-05-04 NOTE — PHYSICAL EXAM
[Thin] : thin [Normal] : oriented to person, place and time, the affect was normal, the mood was normal and not anxious

## 2022-05-04 NOTE — HISTORY OF PRESENT ILLNESS
[FreeTextEntry1] : 05/02/2022- OTV- Today he has completed 2250 cGy out of 7000 cGy to the prostate/sv and notes he is feeling well. Pt c/o no appreciable symptomatology related to his definitive radiation therapy.  Specifically, he notes baseline urine function with nocturia x 4, while not requiring alpha blocker medication. Pt reports strong urine flow during the day, but states he has weaker urine flow at night. He c/o some frequency and urgency.  He denies dysuria, leakage or incontinence.  He denies blood in the urine.  He has no blood or mucous in the stool and denies rectal pain.\par \par \par 4/26/22- OTV : 500/7000cGy to the prostate and proximal seminal vesicles. He is doing well, with no untoward side effects from treatment. He notes trivial change in baseline urinary urgency. We once again reviewed the potential use of alpha blocker medication, though he notes his urinary habits aren't appreciably changed. no bowel changes. we discussed treatment while supine as he has some baseline back pain. will continue RT as planned. \par \par \par Mr. 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 Fer Franklin received 2 prior courses of radiation treatments at [facility]:\par 1. 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 \par 2. 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 He underwent left upper lobectomy on 10/26/20 showing 3.5cm NATALIIA SCC, negative margins, compatible with metastatic SCC.\par \par Prostate Onc Hx:\par ----------------------\par 3/19/21- Originally followed up with [facility] and noted elevated PSA 12.48 ng/mL\par 4/2021-  PSA 11.6ng/mL\par Dr. Briscoe noted that his digital rectal exam was abnormal with bilateral induration.  \par \par 6/2/21 MRI Prostate \par 1.10x9 mm right posteromedial midgland(extending to base) peripheral zone lesion. PI-RADS 4, high(clinically significant cancer likely). No extra prostatic extension. \par 2.8x7mm right posteromedial midgland (extending to apex) peripheral zone lesion. PI-RADS 4, high(clinically significant cancer likely). Lesion abuts capsule without extra prostatic extension. \par 3.No seminal vesical invasion or pelvic lymphadenopathy \par 4.Indeterminate right sacral lesion. Recommend bone scans for further evaluation\par 5.Partial visualization of small to moderate volume fluid in the lower abdomen, unknown etiology. Consider dedicated CT of the abdomen and pelvis for further evaluation. \par 6.Partial visualization of distal left hydroureter. This could be evaluated at time of CT abdomen and pelvis as well. \par  \par \par 6/17/21 CT CAP\par 1. Since 3/16/2021, there has been a decrease in size of a small left pleural effusion. Left pleural thickening again noted.\par 2. New small to moderate ascites in the pelvis, of uncertain etiology.\par 3. New mild left hydronephrosis, the cause of which is not seen.\par 4. Enlarged, lobular and heterogeneous prostate. Increase in size of sclerotic lesion right ilium. Prostate cancer should be ruled out.\par 5. Bladder wall thickening. This could be due to chronic bladder outlet obstruction versus infection.\par 6. No change mild retroperitoneal lymphadenopathy.\par 7. There are two cystic lesions in the pancreas. Recommend correlation with MRI of pancreas.\par 8. No change lung nodules. Continued follow-up recommended.\par \par 9/21/21- Biopsy of the Prostate\par Prostate Biopsy was done by Dr. Terry Obregon at [facility] \par 4+4 in 1/12 cores \par 3+3 in 1/12 cores\par 4+5 in 2/5 cores \par \par 10/12/21 CT Ab/Pelvis\par Overall stable evaluation with no significant change since 6/17/2021.\par Status post left upper lobectomy. No evidence of local recurrence. Unchanged small left pleural effusion\par Enlarged prostate and mildly thickened bladder wall as seen on prior study.\par 2 cystic pancreatic lesions have been stable.\par Persistent small ascites\par \par 10/12/21 Bone scan\par No Scintigraphic evidence of osseous metastasis\par \par 10/14/21- 15.40ng/mL\par \par 10/19/21- Followed up with Dr. Velasquez and received ADT injection\par

## 2022-05-04 NOTE — DISEASE MANAGEMENT
[2] : T2 [c] : c [10-20] : 10 - 20 ng/mL [Biopsy] : Patient had a biopsy on [9] : Template Biopsy Anni Score: 9 [Clinical] : TNM Stage: c [IIB] : IIB [TTNM] : 2b [NTNM] : 0 [MTNM] : 0 [de-identified] : 0452 [de-identified] : 2572 [de-identified] : Prostate/SV

## 2022-05-05 ENCOUNTER — LABORATORY RESULT (OUTPATIENT)
Age: 82
End: 2022-05-05

## 2022-05-05 ENCOUNTER — APPOINTMENT (OUTPATIENT)
Dept: INFUSION THERAPY | Facility: CLINIC | Age: 82
End: 2022-05-05

## 2022-05-05 ENCOUNTER — APPOINTMENT (OUTPATIENT)
Dept: HEMATOLOGY ONCOLOGY | Facility: CLINIC | Age: 82
End: 2022-05-05
Payer: MEDICARE

## 2022-05-05 ENCOUNTER — OUTPATIENT (OUTPATIENT)
Dept: OUTPATIENT SERVICES | Facility: HOSPITAL | Age: 82
LOS: 1 days | End: 2022-05-05
Payer: MEDICARE

## 2022-05-05 VITALS
WEIGHT: 123.9 LBS | TEMPERATURE: 96 F | SYSTOLIC BLOOD PRESSURE: 127 MMHG | HEART RATE: 61 BPM | OXYGEN SATURATION: 99 % | HEIGHT: 72 IN | RESPIRATION RATE: 17 BRPM | DIASTOLIC BLOOD PRESSURE: 69 MMHG

## 2022-05-05 VITALS
RESPIRATION RATE: 18 BRPM | HEIGHT: 72 IN | BODY MASS INDEX: 16.8 KG/M2 | SYSTOLIC BLOOD PRESSURE: 127 MMHG | DIASTOLIC BLOOD PRESSURE: 69 MMHG | OXYGEN SATURATION: 99 % | TEMPERATURE: 95.6 F | WEIGHT: 124 LBS | HEART RATE: 61 BPM

## 2022-05-05 DIAGNOSIS — Z41.9 ENCOUNTER FOR PROCEDURE FOR PURPOSES OTHER THAN REMEDYING HEALTH STATE, UNSPECIFIED: Chronic | ICD-10-CM

## 2022-05-05 DIAGNOSIS — Z90.49 ACQUIRED ABSENCE OF OTHER SPECIFIED PARTS OF DIGESTIVE TRACT: Chronic | ICD-10-CM

## 2022-05-05 DIAGNOSIS — C32.3 MALIGNANT NEOPLASM OF LARYNGEAL CARTILAGE: Chronic | ICD-10-CM

## 2022-05-05 DIAGNOSIS — Z90.89 ACQUIRED ABSENCE OF OTHER ORGANS: Chronic | ICD-10-CM

## 2022-05-05 DIAGNOSIS — C10.9 MALIGNANT NEOPLASM OF OROPHARYNX, UNSPECIFIED: ICD-10-CM

## 2022-05-05 LAB
ALBUMIN SERPL ELPH-MCNC: 3.4 G/DL
ALP BLD-CCNC: 75 U/L
ALT SERPL-CCNC: 14 U/L
ANION GAP SERPL CALC-SCNC: 9 MMOL/L
AST SERPL-CCNC: 23 U/L
BILIRUB SERPL-MCNC: 1 MG/DL
BUN SERPL-MCNC: 12 MG/DL
CALCIUM SERPL-MCNC: 10 MG/DL
CHLORIDE SERPL-SCNC: 99 MMOL/L
CO2 SERPL-SCNC: 34 MMOL/L
CREAT SERPL-MCNC: 0.8 MG/DL
EGFR: 89 ML/MIN/1.73M2
GLUCOSE SERPL-MCNC: 93 MG/DL
MAGNESIUM SERPL-MCNC: 1.9 MG/DL
POTASSIUM SERPL-SCNC: 4.8 MMOL/L
PROT SERPL-MCNC: 6.8 G/DL
SODIUM SERPL-SCNC: 142 MMOL/L

## 2022-05-05 PROCEDURE — 96402 CHEMO HORMON ANTINEOPL SQ/IM: CPT

## 2022-05-05 PROCEDURE — 77385: CPT

## 2022-05-05 PROCEDURE — 99215 OFFICE O/P EST HI 40 MIN: CPT

## 2022-05-05 PROCEDURE — 77336 RADIATION PHYSICS CONSULT: CPT

## 2022-05-05 RX ADMIN — Medication 22.5 MILLIGRAM(S): at 13:04

## 2022-05-05 NOTE — REVIEW OF SYSTEMS
[Fatigue] : fatigue [Recent Change In Weight] : ~T recent weight change [Dysphagia] : dysphagia [Mucosal Pain] : mucosal pain [Shortness Of Breath] : shortness of breath [Constipation] : constipation [Negative] : Respiratory [Fever] : no fever [Chills] : no chills [Night Sweats] : no night sweats [Loss of Hearing] : no loss of hearing [Nosebleeds] : no nosebleeds [Hoarseness] : no hoarseness [Odynophagia] : no odynophagia [Wheezing] : no wheezing [Cough] : no cough [SOB on Exertion] : no shortness of breath during exertion [FreeTextEntry2] : weight loss [FreeTextEntry4] : sore throat past few days, difficulty swallowing foods and water [FreeTextEntry7] : mild constipation

## 2022-05-05 NOTE — HISTORY OF PRESENT ILLNESS
[de-identified] : Mr. Franklin is an 81 year old male, never smoker, with history of hypothyroidism, SCC of the L supraglottic larynx treated w chemo(weekly cisplatin given by Dr. Dowell.)/radiation in 2016 and recurred in the right soft palate, operated 1/2018 and completed RT 4/ 2018, NATALIIA SCC, s/p left upper lobectomy on 10/26/20 who presents to clinic today to discuss about recent CT scans concerning of prostate malignancy.\par \par ONC Hx: \par Mr. 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 He was monitored every 3 months in [place] by Dr. Álvarez and also in [place] by Dr. Bao Sanders. His visit to Dr. Leon in February 2016 was notable for a concerning lesion arising at the right vallecula/right lateral pharyngeal wall. This was separate from the previously treated lesion in the left AE fold that had been removed by laser. On March 2, 2016, Dr. Leon biopsied the right vallecula which showed squamous proliferative papillary lesion with high-grade dysplasia, and the left aryepiglottic fold which showed squamous cell carcinoma. Pathology review at [facility] confirmed the diagnosis of SCC in the left AE fold and squamous dysplasia with high-grade features in the right vallecula. PET/CT scan showed a maximum SUV of 9.2 in the right vallecular area as well as a 1.6 cm left cervical lymph node with maximum SUV of 9.8. FNA of the left neck node on 4/7/16 showed SCC.  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  He was found to have recurrence in the Rt soft palate, Opx 1/2018, and completed 7000 cGy to the oropharynx with Dr. Pandya from 3/12/18-4/27/18, without chemotherapy. Trouble swallowing since that time, eats a normal diet but sometimes aspirates and coughs. 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 He underwent left upper lobectomy with Dr. Mackey on 10/26/20 showing 3.5cm NATALIIA SCC, negative margins, compatible with metastatic SCC.  Observation since then.  He has been following with Dr. Snow and has been CHEY of supraglottic larynx cancer. He has been following with Dr. Briscoe for enlarged prostate and underwent MRI prostate on 6/2/21 due to recent elevated PSA. MRI Prostate showed 10x9 mm, 8x7mm right posteromedial midgland peripheral zone lesion.  CT CAP on 6/17/21 showed enlarged, lobular and heterogeneous prostate. Increase in size of sclerotic lesion right ilium. Prostate cancer should be ruled out.\par \par FH- Mother with breast cancer, Father with larynx cancer\par Last colonoscopy and endoscopy was 4-5 years ago, not remarkable as per patient. \par \par 10/26/20\par Surgical Final Report\par 1. Left level 10 lymph node:\par - One lymph node negative for tumor (0/1).\par 2. Left lower 11 lymph node:\par - One lymph node negative for tumor (0/1).\par 3. Bronchial margin:\par - Bronchial margin negative for tumor.\par 4. Left upper lobe:\par - Lung with squamous cell carcinoma, 3.5 cm. , compatible with metastatic squamous cell carcinoma ( prior history of oropharyngeal squamous cell carcinoma.\par - Parenchymal and vascular margins negative for tumor.\par - Remaining lung with a microscopic tumorlet and foci of pleural and subpleural fibrosis.\par - Nine peribronchial lymph nodes negative for tumor (0/9).\par 5.  AP window lymph node:\par - Two lymph nodes negative for tumor (0/2)\par 6. Level 8 lymph node:\par - One lymph node negative for tumor (0/1).\par 7. Subcarinal lymph node:\par - One lymph node negative for tumor (0/1).\par \par 2/12/21 CT Chest\par Interval left upper lobectomy without suspicious focal abnormality. New moderate left pleural effusion.\par \par 2/26/21 Left-sided thoracentesis\par 700 cc cc of fluid were drained\par Final Diagnosis\par LEFT PLEURAL FLUID, THORACENTESIS:\par NONDIAGNOSTIC\par The specimen is composed of blood only.\par \par 3/16/21 PET/CT\par 1.  Small left pleural effusion without increased FDG uptake.\par 2.  Post left upper lobectomy. No FDG avid pulmonary nodules.\par \par 6/2/21 MRI Prostate \par 1.10x9 mm right posteromedial midgland(extending to base) peripheral zone lesion. PI-RADS 4, high(clinically significant cancer likely). No extra prostatic extension. \par 2.8x7mm right posteromedial midgland (extending to apex) peripheral zone lesion. PI-RADS 4, high(clinically significant cancer likely). Lesion abuts capsule without extra prostatic extension. \par 3.No seminal vesical invasion or pelvic lymphadenopathy \par 4.Indeterminate right sacral lesion. Recommend bone scans for further evaluation\par 5.Partial visualization of small to moderate volume fluid in the lower abdomen, unkown etiology. Consider dedicated CT of the abdomen and pelvis for further evaluation. \par 6.Partial visualization of distal left hydroureter. This could be evaluated at time of CT abdomen and pelvis as well. \par \par 6/17/21 CT CAP\par 1. Since 3/16/2021, there has been a decrease in size of a small left pleural effusion. Left pleural thickening again noted.\par 2. New small to moderate ascites in the pelvis, of uncertain etiology.\par 3. New mild left hydronephrosis, the cause of which is not seen.\par 4. Enlarged, lobular and heterogeneous prostate. Increase in size of sclerotic lesion right ilium. Prostate cancer should be ruled out.\par 5. Bladder wall thickening. This could be due to chronic bladder outlet obstruction versus infection.\par 6. No change mild retroperitoneal lymphadenopathy.\par 7. There are two cystic lesions in the pancreas. Recommend correlation with MRI of pancreas.\par 8. No change lung nodules. Continued follow-up recommended.\par \par 3/23/22 MRI PROSTATE WITH AND WITHOUT CONTRAST\par Minimal extracapsular extension of disease posterior right prostate gland. This is however  from the anterior wall of rectum by the spacer material. Key images are provided for your review.\par \par 4/7/22 CT CHEST IC\par 1.  Since 10/12/2021, interval development of small and large airways inflammation characterized by bronchial wall thickening and tree-in-bud micronodular opacities most pronounced within the lower lung zones. Chronic infectious and/or inflammatory etiologies cannot be excluded in view of the lower lung zone predilection, the possibility of chronic aspiration pneumonia cannot be excluded.\par 2.  New patchy groundglass opacities some demonstrating a mosaic attenuation pattern (5:139) within the right lower lobe. These may represent areas of air trapping from underlying small airways inflammation however an early multifocal pneumonitis cannot be excluded. Short interval surveillance is suggested to confirm stability after therapeutic administration.\par 3.  Interval development of bilateral solid pulmonary nodules measuring up to 1.4 cm. These may be infectious and/or inflammatory in etiology however neoplasia cannot be excluded. Further evaluation with tissue sampling, PET/CT, or repeat CT chest in 3 months can be considered.\par 4.  Increasing mild mediastinal lymphadenopathy which may be reactive. Largest lymph node measuring 11 mm in the right suprahilar region.\par 5.  Long segment circumferential wall thickening of the esophagus, likely esophagitis, possibly the sequela of radiation.\par 6.  Status post left upper lobectomy with stable postsurgical change..\par 7.  Hepatic steatosis. [de-identified] : Patient does not have new complaints but continues to c/o dysphagia (has rx for endoscopy).  He is currently receiving prostate RT.  Going well overall but having some urinary hesitancy, offered Flomax but declined. \par \par REviewed CT Chest from 4/2022 concerning for infectious etiology but patient was recovering from Flu/PNA prior to the CT Chest.  Denies any new SOB/KNIGHT or pain, etc.. \par \par No problems taking casodex.  Due for luproon today.

## 2022-05-05 NOTE — ASSESSMENT
[FreeTextEntry1] : Mr. Franklin is an 81 year old male, never smoker, with history of hypothyroidism, SCC of the L supraglottic larynx treated w chemo(weekly cisplatin given by Dr. Dowell)/radiation in 2016 and recurred in the rightsoft palate, operated 1/2018 and completed RT 4/ 2018, NATALIIA SCC, s/p left upper lobectomy on 10/26/20 who presents to clinic today to discuss about recent CT scans concerning of prostate malignancy.\par \par Plan\par # Prostate CA [id number]\par -continue bicalutamide and lupron ingection 22.5mg q 3 months due today and next dose due 8/5/22 \par -RT with Dr. Gardner \par -f/u PSA, testosterone today \par -check PSA and testosterone levels today \par -f/u Urology at [facility] \par -counseled patient on taking flomax to prevent potential UTI etc.. \par \par #  NATALIIA SCC, s/p left upper lobectomy on 10/26/20 \par -Interval CT CHest in 6 weeks to follow up on previous.  \par \par #SCC of the L supraglottic larynx,s/p CT/RT in 2016, recurred in the right soft palate, operated in 1/2018 and completed RT in 4/ 2018\par -CHEY \par -Continue to f/u with Dr. Snow\par -Continue daily neck exercises \par \par # dysphagia/odynophagia could be 2/2 post RT strictures, achalasia, etc.. \par -Pt to follow up with his GI doc \par -discussed the differential dx and w/u (EGD) and possible dilitatation.  \par \par # Health maintenance \par -Colonoscopy as per PCP\par -Received covid vaccine\par \par RTC 6 weeks \par  \par spent 40 min with patient

## 2022-05-06 LAB
PSA FREE FLD-MCNC: 25 %
PSA FREE SERPL-MCNC: 0.09 NG/ML
PSA SERPL-MCNC: 0.36 NG/ML
TESTOST FREE SERPL-MCNC: 1.2 PG/ML
TESTOST SERPL-MCNC: 13.2 NG/DL
TSH SERPL-ACNC: 4.88 UIU/ML

## 2022-05-10 NOTE — VITALS
This office note has been dictated.   CONFIRMATION #:[id number] [90: Able to carry normal activity; minor signs or symptoms of disease.] : 90: Able to carry normal activity; minor signs or symptoms of disease.  [ECOG Performance Status: 1 - Restricted in physically strenuous activity but ambulatory and able to carry out work of a light or sedentary nature] : Performance Status: 1 - Restricted in physically strenuous activity but ambulatory and able to carry out work of a light or sedentary nature, e.g., light house work, office work

## 2022-05-11 ENCOUNTER — NON-APPOINTMENT (OUTPATIENT)
Age: 82
End: 2022-05-11

## 2022-05-13 PROBLEM — Z00.00 ENCOUNTER FOR PREVENTIVE HEALTH EXAMINATION: Noted: 2022-05-13

## 2022-05-15 NOTE — HISTORY OF PRESENT ILLNESS
[FreeTextEntry1] : 05/11/2022- OTV- Today he has completed 3500 cGy out of 7000 cGy to the prostate/sv and notes he is feeling well. Pt c/o no appreciable symptomatology related to his definitive radiation therapy.  Specifically, he notes baseline urine function with nocturia x 4, while not requiring alpha blocker medication. Pt reports strong urine flow during the day, but states he has weaker urine flow at night. He c/o some urinary frequency and urgency.  He denies dysuria, leakage or incontinence.  He denies blood in the urine.  He has no blood or mucous in the stool and denies rectal pain, but complains of some urgency with BM. will continue RT as planned.\par \par 05/02/2022- OTV- Today he has completed 2250 cGy out of 7000 cGy to the prostate/sv and notes he is feeling well. Pt c/o no appreciable symptomatology related to his definitive radiation therapy.  Specifically, he notes baseline urine function with nocturia x 4, while not requiring alpha blocker medication. Pt reports strong urine flow during the day, but states he has weaker urine flow at night. He c/o some frequency and urgency.  He denies dysuria, leakage or incontinence.  He denies blood in the urine.  He has no blood or mucous in the stool and denies rectal pain.\par \par \par 4/26/22- OTV : 500/7000cGy to the prostate and proximal seminal vesicles. He is doing well, with no untoward side effects from treatment. He notes trivial change in baseline urinary urgency. We once again reviewed the potential use of alpha blocker medication, though he notes his urinary habits aren't appreciably changed. no bowel changes. we discussed treatment while supine as he has some baseline back pain. will continue RT as planned. \par \par \par Mr. 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 Fer Franklin received 2 prior courses of radiation treatments at [facility]:\par 1. 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 \par 2. 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 He underwent left upper lobectomy on 10/26/20 showing 3.5cm NATALIIA SCC, negative margins, compatible with metastatic SCC.\par \par Prostate Onc Hx:\par ----------------------\par 3/19/21- Originally followed up with [facility] and noted elevated PSA 12.48 ng/mL\par 4/2021-  PSA 11.6ng/mL\par Dr. Briscoe noted that his digital rectal exam was abnormal with bilateral induration.  \par \par 6/2/21 MRI Prostate \par 1.10x9 mm right posteromedial midgland(extending to base) peripheral zone lesion. PI-RADS 4, high(clinically significant cancer likely). No extra prostatic extension. \par 2.8x7mm right posteromedial midgland (extending to apex) peripheral zone lesion. PI-RADS 4, high(clinically significant cancer likely). Lesion abuts capsule without extra prostatic extension. \par 3.No seminal vesical invasion or pelvic lymphadenopathy \par 4.Indeterminate right sacral lesion. Recommend bone scans for further evaluation\par 5.Partial visualization of small to moderate volume fluid in the lower abdomen, unknown etiology. Consider dedicated CT of the abdomen and pelvis for further evaluation. \par 6.Partial visualization of distal left hydroureter. This could be evaluated at time of CT abdomen and pelvis as well. \par  \par \par 6/17/21 CT CAP\par 1. Since 3/16/2021, there has been a decrease in size of a small left pleural effusion. Left pleural thickening again noted.\par 2. New small to moderate ascites in the pelvis, of uncertain etiology.\par 3. New mild left hydronephrosis, the cause of which is not seen.\par 4. Enlarged, lobular and heterogeneous prostate. Increase in size of sclerotic lesion right ilium. Prostate cancer should be ruled out.\par 5. Bladder wall thickening. This could be due to chronic bladder outlet obstruction versus infection.\par 6. No change mild retroperitoneal lymphadenopathy.\par 7. There are two cystic lesions in the pancreas. Recommend correlation with MRI of pancreas.\par 8. No change lung nodules. Continued follow-up recommended.\par \par 9/21/21- Biopsy of the Prostate\par Prostate Biopsy was done by Dr. Terry Obregon at [facility] \par 4+4 in 1/12 cores \par 3+3 in 1/12 cores\par 4+5 in 2/5 cores \par \par 10/12/21 CT Ab/Pelvis\par Overall stable evaluation with no significant change since 6/17/2021.\par Status post left upper lobectomy. No evidence of local recurrence. Unchanged small left pleural effusion\par Enlarged prostate and mildly thickened bladder wall as seen on prior study.\par 2 cystic pancreatic lesions have been stable.\par Persistent small ascites\par \par 10/12/21 Bone scan\par No Scintigraphic evidence of osseous metastasis\par \par 10/14/21- 15.40ng/mL\par \par 10/19/21- Followed up with Dr. Velasquez and received ADT injection\par

## 2022-05-18 ENCOUNTER — APPOINTMENT (OUTPATIENT)
Dept: OTOLARYNGOLOGY | Facility: CLINIC | Age: 82
End: 2022-05-18
Payer: MEDICARE

## 2022-05-18 ENCOUNTER — NON-APPOINTMENT (OUTPATIENT)
Age: 82
End: 2022-05-18

## 2022-05-18 VITALS
BODY MASS INDEX: 18.18 KG/M2 | WEIGHT: 127 LBS | DIASTOLIC BLOOD PRESSURE: 75 MMHG | TEMPERATURE: 98 F | HEART RATE: 62 BPM | SYSTOLIC BLOOD PRESSURE: 135 MMHG | HEIGHT: 70 IN

## 2022-05-18 PROCEDURE — 31575 DIAGNOSTIC LARYNGOSCOPY: CPT

## 2022-05-18 PROCEDURE — 99214 OFFICE O/P EST MOD 30 MIN: CPT | Mod: 25

## 2022-05-20 ENCOUNTER — NON-APPOINTMENT (OUTPATIENT)
Age: 82
End: 2022-05-20

## 2022-05-22 NOTE — DISEASE MANAGEMENT
[Clinical] : TNM Stage: c [IIB] : IIB [2] : T2 [c] : c [10-20] : 10 - 20 ng/mL [Biopsy] : Patient had a biopsy on [9] : Template Biopsy Anni Score: 9 [TTNM] : 2b [NTNM] : 0 [MTNM] : 0 [[place] [address]] : 1594 [de-identified] : [id number] [de-identified] : Prostate/SV

## 2022-05-22 NOTE — HISTORY OF PRESENT ILLNESS
[FreeTextEntry1] : 05/18/2022- OTV- Today he has completed 4750 cGy out of 7000 cGy to the prostate/sv and notes he is feeling well. Pt c/o no appreciable symptomatology related to his definitive radiation therapy.  Specifically, he notes a decline in baseline urine function with nocturia x 4 and stated he had some difficulty passing urine at night. Pt check for orthostatic BP which was negative, , so pt educated on and ordered for Flomax as well as instructed on using occasional Advil with meals. Pt reports strong urine flow during the day, but states he has weaker urine flow at night. He c/o some urinary frequency and urgency.  He denies dysuria, leakage or incontinence.  He denies blood in the urine.  He has no blood or mucous in the stool and denies rectal pain, but complains of some urgency with BM, pt stated he discontinued stool softeners and that his urgency with BM's have improved, pt instructed to stay off of stool softener regimen. will continue RT as planned.\par \par \par 05/11/2022- OTV- Today he has completed 3500 cGy out of 7000 cGy to the prostate/sv and notes he is feeling well. Pt c/o no appreciable symptomatology related to his definitive radiation therapy.  Specifically, he notes baseline urine function with nocturia x 4, while not requiring alpha blocker medication. Pt reports strong urine flow during the day, but states he has weaker urine flow at night. He c/o some urinary frequency and urgency.  He denies dysuria, leakage or incontinence.  He denies blood in the urine.  He has no blood or mucous in the stool and denies rectal pain, but complains of some urgency with BM. will continue RT as planned.\par \par 05/02/2022- OTV- Today he has completed 2250 cGy out of 7000 cGy to the prostate/sv and notes he is feeling well. Pt c/o no appreciable symptomatology related to his definitive radiation therapy.  Specifically, he notes baseline urine function with nocturia x 4, while not requiring alpha blocker medication. Pt reports strong urine flow during the day, but states he has weaker urine flow at night. He c/o some frequency and urgency.  He denies dysuria, leakage or incontinence.  He denies blood in the urine.  He has no blood or mucous in the stool and denies rectal pain.\par \par \par 4/26/22- OTV : 500/7000cGy to the prostate and proximal seminal vesicles. He is doing well, with no untoward side effects from treatment. He notes trivial change in baseline urinary urgency. We once again reviewed the potential use of alpha blocker medication, though he notes his urinary habits aren't appreciably changed. no bowel changes. we discussed treatment while supine as he has some baseline back pain. will continue RT as planned. \par \par \par Mr. 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 Fer Franklin received 2 prior courses of radiation treatments at [facility]:\par 1. 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 \par 2. 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 He underwent left upper lobectomy on 10/26/20 showing 3.5cm NATALIIA SCC, negative margins, compatible with metastatic SCC.\par \par Prostate Onc Hx:\par ----------------------\par 3/19/21- Originally followed up with [facility] and noted elevated PSA 12.48 ng/mL\par 4/2021-  PSA 11.6ng/mL\par Dr. Briscoe noted that his digital rectal exam was abnormal with bilateral induration.  \par \par 6/2/21 MRI Prostate \par 1.10x9 mm right posteromedial midgland(extending to base) peripheral zone lesion. PI-RADS 4, high(clinically significant cancer likely). No extra prostatic extension. \par 2.8x7mm right posteromedial midgland (extending to apex) peripheral zone lesion. PI-RADS 4, high(clinically significant cancer likely). Lesion abuts capsule without extra prostatic extension. \par 3.No seminal vesical invasion or pelvic lymphadenopathy \par 4.Indeterminate right sacral lesion. Recommend bone scans for further evaluation\par 5.Partial visualization of small to moderate volume fluid in the lower abdomen, unknown etiology. Consider dedicated CT of the abdomen and pelvis for further evaluation. \par 6.Partial visualization of distal left hydroureter. This could be evaluated at time of CT abdomen and pelvis as well. \par  \par \par 6/17/21 CT CAP\par 1. Since 3/16/2021, there has been a decrease in size of a small left pleural effusion. Left pleural thickening again noted.\par 2. New small to moderate ascites in the pelvis, of uncertain etiology.\par 3. New mild left hydronephrosis, the cause of which is not seen.\par 4. Enlarged, lobular and heterogeneous prostate. Increase in size of sclerotic lesion right ilium. Prostate cancer should be ruled out.\par 5. Bladder wall thickening. This could be due to chronic bladder outlet obstruction versus infection.\par 6. No change mild retroperitoneal lymphadenopathy.\par 7. There are two cystic lesions in the pancreas. Recommend correlation with MRI of pancreas.\par 8. No change lung nodules. Continued follow-up recommended.\par \par 9/21/21- Biopsy of the Prostate\par Prostate Biopsy was done by Dr. Terry Obregon at [facility] \par 4+4 in 1/12 cores \par 3+3 in 1/12 cores\par 4+5 in 2/5 cores \par \par 10/12/21 CT Ab/Pelvis\par Overall stable evaluation with no significant change since 6/17/2021.\par Status post left upper lobectomy. No evidence of local recurrence. Unchanged small left pleural effusion\par Enlarged prostate and mildly thickened bladder wall as seen on prior study.\par 2 cystic pancreatic lesions have been stable.\par Persistent small ascites\par \par 10/12/21 Bone scan\par No Scintigraphic evidence of osseous metastasis\par \par 10/14/21- 15.40ng/mL\par \par 10/19/21- Followed up with Dr. Velasquez and received ADT injection\par

## 2022-05-24 ENCOUNTER — APPOINTMENT (OUTPATIENT)
Dept: HEART AND VASCULAR | Facility: CLINIC | Age: 82
End: 2022-05-24
Payer: MEDICARE

## 2022-05-24 ENCOUNTER — NON-APPOINTMENT (OUTPATIENT)
Age: 82
End: 2022-05-24

## 2022-05-24 VITALS
DIASTOLIC BLOOD PRESSURE: 80 MMHG | HEIGHT: 70 IN | OXYGEN SATURATION: 97 % | SYSTOLIC BLOOD PRESSURE: 150 MMHG | TEMPERATURE: 97.8 F | WEIGHT: 125 LBS | HEART RATE: 61 BPM | BODY MASS INDEX: 17.9 KG/M2

## 2022-05-24 VITALS — SYSTOLIC BLOOD PRESSURE: 169 MMHG | DIASTOLIC BLOOD PRESSURE: 76 MMHG

## 2022-05-24 PROCEDURE — 93306 TTE W/DOPPLER COMPLETE: CPT

## 2022-05-24 PROCEDURE — 93000 ELECTROCARDIOGRAM COMPLETE: CPT

## 2022-05-24 PROCEDURE — 99214 OFFICE O/P EST MOD 30 MIN: CPT | Mod: 25

## 2022-05-27 ENCOUNTER — NON-APPOINTMENT (OUTPATIENT)
Age: 82
End: 2022-05-27

## 2022-06-01 ENCOUNTER — NON-APPOINTMENT (OUTPATIENT)
Age: 82
End: 2022-06-01

## 2022-06-05 NOTE — HISTORY OF PRESENT ILLNESS
[FreeTextEntry1] : 06/1/2022- OTV- Today he has completed 7000 cGy out of 7000 cGy to the prostate/sv and notes he is feeling well. Pt c/o no appreciable symptomatology related to his definitive radiation therapy.  Specifically, he notes an improvement in baseline urine function with nocturia x 4. Pt continues on Flomax and reports strong urine flow during the day, but states he sometimes has weaker urine flow at night. He c/o urinary frequency and urgency, but states it is less now on Flomax.  He denies dysuria, leakage or incontinence.  He denies blood in the urine.  He has no blood or mucous in the stool and denies rectal pain. He c/o slight bowel urgency when urinating.  He remains off stool softeners. He completes his course of RT without any untoward interruptions.  will continue ADT component for his high risk disease.  given follow-up appointment and will remain available in the interim should he have any questions or concerns.\par  \par 05/27/2022- OTV- Today he has completed 6500 cGy out of 7000 cGy to the prostate/sv and notes he is feeling well. Pt c/o no appreciable symptomatology related to his definitive radiation therapy.  Specifically, he notes an improvement in baseline urine function with nocturia x 4 and states his difficulty passing urine at night has subsided.  Pt continues on Flomax and reports strong urine flow during the day, but states he has weaker urine flow at night. He c/o urinary frequency and urgency, but states it is less now on Flomax.  He denies dysuria, leakage or incontinence.  He denies blood in the urine.  He has no blood or mucous in the stool and denies rectal pain, he denies any urgency with BM, pt stated he discontinued stool softeners and that his urgency with BM's have improved, pt instructed to stay off of stool softener regimen. will continue RT as planned.\par \par \par \par 05/18/2022- OTV- Today he has completed 4750 cGy out of 7000 cGy to the prostate/sv and notes he is feeling well. Pt c/o no appreciable symptomatology related to his definitive radiation therapy.  Specifically, he notes a decline in baseline urine function with nocturia x 4 and stated he had some difficulty passing urine at night. Pt check for orthostatic BP which was negative, , so pt educated on and ordered for Flomax as well as instructed on using occasional Advil with meals. Pt reports strong urine flow during the day, but states he has weaker urine flow at night. He c/o some urinary frequency and urgency.  He denies dysuria, leakage or incontinence.  He denies blood in the urine.  He has no blood or mucous in the stool and denies rectal pain, but complains of some urgency with BM, pt stated he discontinued stool softeners and that his urgency with BM's have improved, pt instructed to stay off of stool softener regimen. will continue RT as planned.\par \par \par 05/11/2022- OTV- Today he has completed 3500 cGy out of 7000 cGy to the prostate/sv and notes he is feeling well. Pt c/o no appreciable symptomatology related to his definitive radiation therapy.  Specifically, he notes baseline urine function with nocturia x 4, while not requiring alpha blocker medication. Pt reports strong urine flow during the day, but states he has weaker urine flow at night. He c/o some urinary frequency and urgency.  He denies dysuria, leakage or incontinence.  He denies blood in the urine.  He has no blood or mucous in the stool and denies rectal pain, but complains of some urgency with BM. will continue RT as planned.\par \par 05/02/2022- OTV- Today he has completed 2250 cGy out of 7000 cGy to the prostate/sv and notes he is feeling well. Pt c/o no appreciable symptomatology related to his definitive radiation therapy.  Specifically, he notes baseline urine function with nocturia x 4, while not requiring alpha blocker medication. Pt reports strong urine flow during the day, but states he has weaker urine flow at night. He c/o some frequency and urgency.  He denies dysuria, leakage or incontinence.  He denies blood in the urine.  He has no blood or mucous in the stool and denies rectal pain.\par \par \par 4/26/22- OTV : 500/7000cGy to the prostate and proximal seminal vesicles. He is doing well, with no untoward side effects from treatment. He notes trivial change in baseline urinary urgency. We once again reviewed the potential use of alpha blocker medication, though he notes his urinary habits aren't appreciably changed. no bowel changes. we discussed treatment while supine as he has some baseline back pain. will continue RT as planned. \par \par \par Mr. 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 Fer Franklin received 2 prior courses of radiation treatments at [facility]:\par 1. 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 \par 2. 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 He underwent left upper lobectomy on 10/26/20 showing 3.5cm NATALIIA SCC, negative margins, compatible with metastatic SCC.\par \par Prostate Onc Hx:\par ----------------------\par 3/19/21- Originally followed up with [facility] and noted elevated PSA 12.48 ng/mL\par 4/2021-  PSA 11.6ng/mL\par Dr. Briscoe noted that his digital rectal exam was abnormal with bilateral induration.  \par \par 6/2/21 MRI Prostate \par 1.10x9 mm right posteromedial midgland(extending to base) peripheral zone lesion. PI-RADS 4, high(clinically significant cancer likely). No extra prostatic extension. \par 2.8x7mm right posteromedial midgland (extending to apex) peripheral zone lesion. PI-RADS 4, high(clinically significant cancer likely). Lesion abuts capsule without extra prostatic extension. \par 3.No seminal vesical invasion or pelvic lymphadenopathy \par 4.Indeterminate right sacral lesion. Recommend bone scans for further evaluation\par 5.Partial visualization of small to moderate volume fluid in the lower abdomen, unknown etiology. Consider dedicated CT of the abdomen and pelvis for further evaluation. \par 6.Partial visualization of distal left hydroureter. This could be evaluated at time of CT abdomen and pelvis as well. \par  \par \par 6/17/21 CT CAP\par 1. Since 3/16/2021, there has been a decrease in size of a small left pleural effusion. Left pleural thickening again noted.\par 2. New small to moderate ascites in the pelvis, of uncertain etiology.\par 3. New mild left hydronephrosis, the cause of which is not seen.\par 4. Enlarged, lobular and heterogeneous prostate. Increase in size of sclerotic lesion right ilium. Prostate cancer should be ruled out.\par 5. Bladder wall thickening. This could be due to chronic bladder outlet obstruction versus infection.\par 6. No change mild retroperitoneal lymphadenopathy.\par 7. There are two cystic lesions in the pancreas. Recommend correlation with MRI of pancreas.\par 8. No change lung nodules. Continued follow-up recommended.\par \par 9/21/21- Biopsy of the Prostate\par Prostate Biopsy was done by Dr. Terry Obregon at [facility] \par 4+4 in 1/12 cores \par 3+3 in 1/12 cores\par 4+5 in 2/5 cores \par \par 10/12/21 CT Ab/Pelvis\par Overall stable evaluation with no significant change since 6/17/2021.\par Status post left upper lobectomy. No evidence of local recurrence. Unchanged small left pleural effusion\par Enlarged prostate and mildly thickened bladder wall as seen on prior study.\par 2 cystic pancreatic lesions have been stable.\par Persistent small ascites\par \par 10/12/21 Bone scan\par No Scintigraphic evidence of osseous metastasis\par \par 10/14/21- 15.40ng/mL\par \par 10/19/21- Followed up with Dr. Velasquez and received ADT injection\par

## 2022-06-05 NOTE — HISTORY OF PRESENT ILLNESS
[FreeTextEntry1] : 05/27/2022- OTV- Today he has completed 6500 cGy out of 7000 cGy to the prostate/sv and notes he is feeling well. Pt c/o no appreciable symptomatology related to his definitive radiation therapy.  Specifically, he notes an improvement in baseline urine function with nocturia x 4 and states his difficulty passing urine at night has subsided.  Pt continues on Flomax and reports strong urine flow during the day, but states he has weaker urine flow at night. He c/o urinary frequency and urgency, but states it is less now on Flomax.  He denies dysuria, leakage or incontinence.  He denies blood in the urine.  He has no blood or mucous in the stool and denies rectal pain, he denies any urgency with BM, pt stated he discontinued stool softeners and that his urgency with BM's have improved, pt instructed to stay off of stool softener regimen. will continue RT as planned.\par \par \par \par 05/18/2022- OTV- Today he has completed 4750 cGy out of 7000 cGy to the prostate/sv and notes he is feeling well. Pt c/o no appreciable symptomatology related to his definitive radiation therapy.  Specifically, he notes a decline in baseline urine function with nocturia x 4 and stated he had some difficulty passing urine at night. Pt check for orthostatic BP which was negative, , so pt educated on and ordered for Flomax as well as instructed on using occasional Advil with meals. Pt reports strong urine flow during the day, but states he has weaker urine flow at night. He c/o some urinary frequency and urgency.  He denies dysuria, leakage or incontinence.  He denies blood in the urine.  He has no blood or mucous in the stool and denies rectal pain, but complains of some urgency with BM, pt stated he discontinued stool softeners and that his urgency with BM's have improved, pt instructed to stay off of stool softener regimen. will continue RT as planned.\par \par \par 05/11/2022- OTV- Today he has completed 3500 cGy out of 7000 cGy to the prostate/sv and notes he is feeling well. Pt c/o no appreciable symptomatology related to his definitive radiation therapy.  Specifically, he notes baseline urine function with nocturia x 4, while not requiring alpha blocker medication. Pt reports strong urine flow during the day, but states he has weaker urine flow at night. He c/o some urinary frequency and urgency.  He denies dysuria, leakage or incontinence.  He denies blood in the urine.  He has no blood or mucous in the stool and denies rectal pain, but complains of some urgency with BM. will continue RT as planned.\par \par 05/02/2022- OTV- Today he has completed 2250 cGy out of 7000 cGy to the prostate/sv and notes he is feeling well. Pt c/o no appreciable symptomatology related to his definitive radiation therapy.  Specifically, he notes baseline urine function with nocturia x 4, while not requiring alpha blocker medication. Pt reports strong urine flow during the day, but states he has weaker urine flow at night. He c/o some frequency and urgency.  He denies dysuria, leakage or incontinence.  He denies blood in the urine.  He has no blood or mucous in the stool and denies rectal pain.\par \par \par 4/26/22- OTV : 500/7000cGy to the prostate and proximal seminal vesicles. He is doing well, with no untoward side effects from treatment. He notes trivial change in baseline urinary urgency. We once again reviewed the potential use of alpha blocker medication, though he notes his urinary habits aren't appreciably changed. no bowel changes. we discussed treatment while supine as he has some baseline back pain. will continue RT as planned. \par \par \par Mr. 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 Fer Franklin received 2 prior courses of radiation treatments at [facility]:\par 1. 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 \par 2. 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 He underwent left upper lobectomy on 10/26/20 showing 3.5cm NATALIIA SCC, negative margins, compatible with metastatic SCC.\par \par Prostate Onc Hx:\par ----------------------\par 3/19/21- Originally followed up with [facility] and noted elevated PSA 12.48 ng/mL\par 4/2021-  PSA 11.6ng/mL\par Dr. Briscoe noted that his digital rectal exam was abnormal with bilateral induration.  \par \par 6/2/21 MRI Prostate \par 1.10x9 mm right posteromedial midgland(extending to base) peripheral zone lesion. PI-RADS 4, high(clinically significant cancer likely). No extra prostatic extension. \par 2.8x7mm right posteromedial midgland (extending to apex) peripheral zone lesion. PI-RADS 4, high(clinically significant cancer likely). Lesion abuts capsule without extra prostatic extension. \par 3.No seminal vesical invasion or pelvic lymphadenopathy \par 4.Indeterminate right sacral lesion. Recommend bone scans for further evaluation\par 5.Partial visualization of small to moderate volume fluid in the lower abdomen, unknown etiology. Consider dedicated CT of the abdomen and pelvis for further evaluation. \par 6.Partial visualization of distal left hydroureter. This could be evaluated at time of CT abdomen and pelvis as well. \par  \par \par 6/17/21 CT CAP\par 1. Since 3/16/2021, there has been a decrease in size of a small left pleural effusion. Left pleural thickening again noted.\par 2. New small to moderate ascites in the pelvis, of uncertain etiology.\par 3. New mild left hydronephrosis, the cause of which is not seen.\par 4. Enlarged, lobular and heterogeneous prostate. Increase in size of sclerotic lesion right ilium. Prostate cancer should be ruled out.\par 5. Bladder wall thickening. This could be due to chronic bladder outlet obstruction versus infection.\par 6. No change mild retroperitoneal lymphadenopathy.\par 7. There are two cystic lesions in the pancreas. Recommend correlation with MRI of pancreas.\par 8. No change lung nodules. Continued follow-up recommended.\par \par 9/21/21- Biopsy of the Prostate\par Prostate Biopsy was done by Dr. Terry Obregon at [facility] \par 4+4 in 1/12 cores \par 3+3 in 1/12 cores\par 4+5 in 2/5 cores \par \par 10/12/21 CT Ab/Pelvis\par Overall stable evaluation with no significant change since 6/17/2021.\par Status post left upper lobectomy. No evidence of local recurrence. Unchanged small left pleural effusion\par Enlarged prostate and mildly thickened bladder wall as seen on prior study.\par 2 cystic pancreatic lesions have been stable.\par Persistent small ascites\par \par 10/12/21 Bone scan\par No Scintigraphic evidence of osseous metastasis\par \par 10/14/21- 15.40ng/mL\par \par 10/19/21- Followed up with Dr. Velasquez and received ADT injection\par

## 2022-06-05 NOTE — DISEASE MANAGEMENT
[Clinical] : TNM Stage: c [IIB] : IIB [2] : T2 [c] : c [10-20] : 10 - 20 ng/mL [Biopsy] : Patient had a biopsy on [9] : Template Biopsy Anni Score: 9 [TTNM] : 2b [NTNM] : 0 [MTNM] : 0 [de-identified] : 6234 [de-identified] : 6284 [de-identified] : Prostate/SV

## 2022-06-05 NOTE — DISEASE MANAGEMENT
[Clinical] : TNM Stage: c [IIB] : IIB [2] : T2 [c] : c [10-20] : 10 - 20 ng/mL [Biopsy] : Patient had a biopsy on [9] : Template Biopsy Anni Score: 9 [TTNM] : 2b [NTNM] : 0 [MTNM] : 0 [de-identified] : 5105 [de-identified] : 2334 [de-identified] : Prostate/SV

## 2022-06-07 NOTE — ED ADULT NURSE NOTE - NS ED NURSE LEVEL OF CONSCIOUSNESS AFFECT
Problem: PAIN - ADULT  Goal: Verbalizes/displays adequate comfort level or baseline comfort level  Description: Interventions:  - Encourage patient to monitor pain and request assistance  - Assess pain using appropriate pain scale  - Administer analgesics based on type and severity of pain and evaluate response  - Implement non-pharmacological measures as appropriate and evaluate response  - Consider cultural and social influences on pain and pain management  - Notify physician/advanced practitioner if interventions unsuccessful or patient reports new pain  Outcome: Progressing     Problem: INFECTION - ADULT  Goal: Absence or prevention of progression during hospitalization  Description: INTERVENTIONS:  - Assess and monitor for signs and symptoms of infection  - Monitor lab/diagnostic results  - Monitor all insertion sites, i e  indwelling lines, tubes, and drains  - Monitor endotracheal if appropriate and nasal secretions for changes in amount and color  - Mulga appropriate cooling/warming therapies per order  - Administer medications as ordered  - Instruct and encourage patient and family to use good hand hygiene technique  - Identify and instruct in appropriate isolation precautions for identified infection/condition  Outcome: Progressing  Goal: Absence of fever/infection during neutropenic period  Description: INTERVENTIONS:  - Monitor WBC    Outcome: Progressing     Problem: DISCHARGE PLANNING  Goal: Discharge to home or other facility with appropriate resources  Description: INTERVENTIONS:  - Identify barriers to discharge w/patient and caregiver  - Arrange for needed discharge resources and transportation as appropriate  - Identify discharge learning needs (meds, wound care, etc )  - Arrange for interpretive services to assist at discharge as needed  - Refer to Case Management Department for coordinating discharge planning if the patient needs post-hospital services based on physician/advanced practitioner order or complex needs related to functional status, cognitive ability, or social support system  Outcome: Progressing     Problem: Knowledge Deficit  Goal: Patient/family/caregiver demonstrates understanding of disease process, treatment plan, medications, and discharge instructions  Description: Complete learning assessment and assess knowledge base    Interventions:  - Provide teaching at level of understanding  - Provide teaching via preferred learning methods  Outcome: Progressing Calm

## 2022-06-16 ENCOUNTER — APPOINTMENT (OUTPATIENT)
Dept: CT IMAGING | Facility: HOSPITAL | Age: 82
End: 2022-06-16

## 2022-06-16 ENCOUNTER — RESULT REVIEW (OUTPATIENT)
Age: 82
End: 2022-06-16

## 2022-06-16 ENCOUNTER — OUTPATIENT (OUTPATIENT)
Dept: OUTPATIENT SERVICES | Facility: HOSPITAL | Age: 82
LOS: 1 days | End: 2022-06-16
Payer: MEDICARE

## 2022-06-16 DIAGNOSIS — Z41.9 ENCOUNTER FOR PROCEDURE FOR PURPOSES OTHER THAN REMEDYING HEALTH STATE, UNSPECIFIED: Chronic | ICD-10-CM

## 2022-06-16 DIAGNOSIS — Z90.89 ACQUIRED ABSENCE OF OTHER ORGANS: Chronic | ICD-10-CM

## 2022-06-16 DIAGNOSIS — Z90.49 ACQUIRED ABSENCE OF OTHER SPECIFIED PARTS OF DIGESTIVE TRACT: Chronic | ICD-10-CM

## 2022-06-16 DIAGNOSIS — C32.3 MALIGNANT NEOPLASM OF LARYNGEAL CARTILAGE: Chronic | ICD-10-CM

## 2022-06-16 LAB — POCT ISTAT CREATININE: 0.6 MG/DL — SIGNIFICANT CHANGE UP (ref 0.5–1.3)

## 2022-06-16 PROCEDURE — 82565 ASSAY OF CREATININE: CPT

## 2022-06-16 PROCEDURE — 71260 CT THORAX DX C+: CPT | Mod: 26,ME

## 2022-06-16 PROCEDURE — G1004: CPT

## 2022-06-16 PROCEDURE — 71260 CT THORAX DX C+: CPT | Mod: ME

## 2022-06-23 ENCOUNTER — APPOINTMENT (OUTPATIENT)
Dept: HEMATOLOGY ONCOLOGY | Facility: CLINIC | Age: 82
End: 2022-06-23

## 2022-06-23 PROCEDURE — 99443: CPT | Mod: 95

## 2022-06-23 NOTE — REASON FOR VISIT
[Home] : at home, [unfilled] , at the time of the visit. [Medical Office: ([facility])___] : at the medical office located in  [Verbal consent obtained from patient] : the patient, [unfilled]

## 2022-07-08 NOTE — ASSESSMENT
[FreeTextEntry1] : Mr. Franklin is an 81 year old male, never smoker, with history of hypothyroidism, SCC of the L supraglottic larynx treated w chemo(weekly cisplatin given by Dr. Dowell)/radiation in 2016 and recurred in the rightsoft palate, operated 1/2018 and completed RT 4/ 2018, NATALIIA SCC, s/p left upper lobectomy on 10/26/20 who presents to clinic today to discuss about recent CT scans concerning of prostate malignancy.\par \par Plan\par # Prostate CA [id number]\par -continue bicalutamide and lupron ingection 22.5mg q 3 months due today and next dose due 8/5/22 \par -RT with Dr. Gardner \par -f/u PSA, testosterone today \par -check PSA and testosterone levels today \par -f/u Urology at [facility] \par -counseled patient on taking flomax to prevent potential UTI etc.. 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 Snow\par -Continue daily neck exercises \par \par # dysphagia/odynophagia could be 2/2 post RT strictures, achalasia, etc.. \par -Pt to follow up with his GI doc \par -discussed the differential dx and w/u (EGD) and possible dilitatation.  \par \par # Health maintenance \par -Colonoscopy as per PCP\par -Received covid vaccine\par \par RTC 6 weeks \par  \par spent 40 min with patient

## 2022-07-08 NOTE — HISTORY OF PRESENT ILLNESS
[de-identified] : Mr. Franklin is an 81 year old male, never smoker, with history of hypothyroidism, SCC of the L supraglottic larynx treated w chemo(weekly cisplatin given by Dr. Dowell.)/radiation in 2016 and recurred in the right soft palate, operated 1/2018 and completed RT 4/ 2018, NATALIIA SCC, s/p left upper lobectomy on 10/26/20 who presents to clinic today to discuss about recent CT scans concerning of prostate malignancy.\par \par ONC Hx: \par Mr. 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 He was monitored every 3 months in [place] by Dr. Álvarez and also in [place] by Dr. Bao Sanders. His visit to Dr. Leon in February 2016 was notable for a concerning lesion arising at the right vallecula/right lateral pharyngeal wall. This was separate from the previously treated lesion in the left AE fold that had been removed by laser. On March 2, 2016, Dr. Leon biopsied the right vallecula which showed squamous proliferative papillary lesion with high-grade dysplasia, and the left aryepiglottic fold which showed squamous cell carcinoma. Pathology review at [facility] confirmed the diagnosis of SCC in the left AE fold and squamous dysplasia with high-grade features in the right vallecula. PET/CT scan showed a maximum SUV of 9.2 in the right vallecular area as well as a 1.6 cm left cervical lymph node with maximum SUV of 9.8. FNA of the left neck node on 4/7/16 showed SCC.  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  He was found to have recurrence in the Rt soft palate, Opx 1/2018, and completed 7000 cGy to the oropharynx with Dr. Pandya from 3/12/18-4/27/18, without chemotherapy. Trouble swallowing since that time, eats a normal diet but sometimes aspirates and coughs. 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 He underwent left upper lobectomy with Dr. Mackey on 10/26/20 showing 3.5cm NATALIIA SCC, negative margins, compatible with metastatic SCC.  Observation since then.  He has been following with Dr. Snow and has been CHEY of supraglottic larynx cancer. He has been following with Dr. Briscoe for enlarged prostate and underwent MRI prostate on 6/2/21 due to recent elevated PSA. MRI Prostate showed 10x9 mm, 8x7mm right posteromedial midgland peripheral zone lesion.  CT CAP on 6/17/21 showed enlarged, lobular and heterogeneous prostate. Increase in size of sclerotic lesion right ilium. Prostate cancer should be ruled out.\par \par FH- Mother with breast cancer, Father with larynx cancer\par Last colonoscopy and endoscopy was 4-5 years ago, not remarkable as per patient. \par \par 10/26/20\par Surgical Final Report\par 1. Left level 10 lymph node:\par - One lymph node negative for tumor (0/1).\par 2. Left lower 11 lymph node:\par - One lymph node negative for tumor (0/1).\par 3. Bronchial margin:\par - Bronchial margin negative for tumor.\par 4. Left upper lobe:\par - Lung with squamous cell carcinoma, 3.5 cm. , compatible with metastatic squamous cell carcinoma ( prior history of oropharyngeal squamous cell carcinoma.\par - Parenchymal and vascular margins negative for tumor.\par - Remaining lung with a microscopic tumorlet and foci of pleural and subpleural fibrosis.\par - Nine peribronchial lymph nodes negative for tumor (0/9).\par 5.  AP window lymph node:\par - Two lymph nodes negative for tumor (0/2)\par 6. Level 8 lymph node:\par - One lymph node negative for tumor (0/1).\par 7. Subcarinal lymph node:\par - One lymph node negative for tumor (0/1).\par \par 2/12/21 CT Chest\par Interval left upper lobectomy without suspicious focal abnormality. New moderate left pleural effusion.\par \par 2/26/21 Left-sided thoracentesis\par 700 cc cc of fluid were drained\par Final Diagnosis\par LEFT PLEURAL FLUID, THORACENTESIS:\par NONDIAGNOSTIC\par The specimen is composed of blood only.\par \par 3/16/21 PET/CT\par 1.  Small left pleural effusion without increased FDG uptake.\par 2.  Post left upper lobectomy. No FDG avid pulmonary nodules.\par \par 6/2/21 MRI Prostate \par 1.10x9 mm right posteromedial midgland(extending to base) peripheral zone lesion. PI-RADS 4, high(clinically significant cancer likely). No extra prostatic extension. \par 2.8x7mm right posteromedial midgland (extending to apex) peripheral zone lesion. PI-RADS 4, high(clinically significant cancer likely). Lesion abuts capsule without extra prostatic extension. \par 3.No seminal vesical invasion or pelvic lymphadenopathy \par 4.Indeterminate right sacral lesion. Recommend bone scans for further evaluation\par 5.Partial visualization of small to moderate volume fluid in the lower abdomen, unkown etiology. Consider dedicated CT of the abdomen and pelvis for further evaluation. \par 6.Partial visualization of distal left hydroureter. This could be evaluated at time of CT abdomen and pelvis as well. \par \par 6/17/21 CT CAP\par 1. Since 3/16/2021, there has been a decrease in size of a small left pleural effusion. Left pleural thickening again noted.\par 2. New small to moderate ascites in the pelvis, of uncertain etiology.\par 3. New mild left hydronephrosis, the cause of which is not seen.\par 4. Enlarged, lobular and heterogeneous prostate. Increase in size of sclerotic lesion right ilium. Prostate cancer should be ruled out.\par 5. Bladder wall thickening. This could be due to chronic bladder outlet obstruction versus infection.\par 6. No change mild retroperitoneal lymphadenopathy.\par 7. There are two cystic lesions in the pancreas. Recommend correlation with MRI of pancreas.\par 8. No change lung nodules. Continued follow-up recommended.\par \par 3/23/22 MRI PROSTATE WITH AND WITHOUT CONTRAST\par Minimal extracapsular extension of disease posterior right prostate gland. This is however  from the anterior wall of rectum by the spacer material. Key images are provided for your review.\par \par 4/7/22 CT CHEST IC\par 1.  Since 10/12/2021, interval development of small and large airways inflammation characterized by bronchial wall thickening and tree-in-bud micronodular opacities most pronounced within the lower lung zones. Chronic infectious and/or inflammatory etiologies cannot be excluded in view of the lower lung zone predilection, the possibility of chronic aspiration pneumonia cannot be excluded.\par 2.  New patchy groundglass opacities some demonstrating a mosaic attenuation pattern (5:139) within the right lower lobe. These may represent areas of air trapping from underlying small airways inflammation however an early multifocal pneumonitis cannot be excluded. Short interval surveillance is suggested to confirm stability after therapeutic administration.\par 3.  Interval development of bilateral solid pulmonary nodules measuring up to 1.4 cm. These may be infectious and/or inflammatory in etiology however neoplasia cannot be excluded. Further evaluation with tissue sampling, PET/CT, or repeat CT chest in 3 months can be considered.\par 4.  Increasing mild mediastinal lymphadenopathy which may be reactive. Largest lymph node measuring 11 mm in the right suprahilar region.\par 5.  Long segment circumferential wall thickening of the esophagus, likely esophagitis, possibly the sequela of radiation.\par 6.  Status post left upper lobectomy with stable postsurgical change..\par 7.  Hepatic steatosis. [de-identified] : Patient does not have new complaints but continues to c/o dysphagia (has rx for endoscopy).  He is currently receiving prostate RT.  Going well overall but having some urinary hesitancy, offered Flomax but declined. \par \par REviewed CT Chest from 4/2022 concerning for infectious etiology but patient was recovering from Flu/PNA prior to the CT Chest.  Denies any new SOB/KNIGHT or pain, etc.. \par \par No problems taking casodex.  Due for luproon today.

## 2022-07-08 NOTE — REVIEW OF SYSTEMS
[Fever] : no fever [Chills] : no chills [Night Sweats] : no night sweats [Loss of Hearing] : no loss of hearing [Nosebleeds] : no nosebleeds [Hoarseness] : no hoarseness [Odynophagia] : no odynophagia [Wheezing] : no wheezing [Cough] : no cough [SOB on Exertion] : no shortness of breath during exertion [FreeTextEntry2] : weight loss [FreeTextEntry4] : sore throat past few days, difficulty swallowing foods and water [FreeTextEntry7] : mild constipation

## 2022-07-21 ENCOUNTER — RESULT REVIEW (OUTPATIENT)
Age: 82
End: 2022-07-21

## 2022-08-02 ENCOUNTER — APPOINTMENT (OUTPATIENT)
Dept: RADIATION ONCOLOGY | Facility: CLINIC | Age: 82
End: 2022-08-02

## 2022-08-04 ENCOUNTER — APPOINTMENT (OUTPATIENT)
Dept: RADIOLOGY | Facility: HOSPITAL | Age: 82
End: 2022-08-04

## 2022-08-04 ENCOUNTER — OUTPATIENT (OUTPATIENT)
Dept: OUTPATIENT SERVICES | Facility: HOSPITAL | Age: 82
LOS: 1 days | End: 2022-08-04
Payer: MEDICARE

## 2022-08-04 DIAGNOSIS — Z90.89 ACQUIRED ABSENCE OF OTHER ORGANS: Chronic | ICD-10-CM

## 2022-08-04 DIAGNOSIS — Z41.9 ENCOUNTER FOR PROCEDURE FOR PURPOSES OTHER THAN REMEDYING HEALTH STATE, UNSPECIFIED: Chronic | ICD-10-CM

## 2022-08-04 DIAGNOSIS — C32.3 MALIGNANT NEOPLASM OF LARYNGEAL CARTILAGE: Chronic | ICD-10-CM

## 2022-08-04 DIAGNOSIS — Z90.49 ACQUIRED ABSENCE OF OTHER SPECIFIED PARTS OF DIGESTIVE TRACT: Chronic | ICD-10-CM

## 2022-08-04 PROCEDURE — 92611 MOTION FLUOROSCOPY/SWALLOW: CPT | Mod: GN

## 2022-08-04 PROCEDURE — 74230 X-RAY XM SWLNG FUNCJ C+: CPT | Mod: 26

## 2022-08-04 PROCEDURE — 74230 X-RAY XM SWLNG FUNCJ C+: CPT

## 2022-08-24 ENCOUNTER — APPOINTMENT (OUTPATIENT)
Dept: OTOLARYNGOLOGY | Facility: CLINIC | Age: 82
End: 2022-08-24

## 2022-08-24 ENCOUNTER — APPOINTMENT (OUTPATIENT)
Dept: RADIOLOGY | Facility: HOSPITAL | Age: 82
End: 2022-08-24

## 2022-08-24 PROCEDURE — 92526 ORAL FUNCTION THERAPY: CPT | Mod: GN

## 2022-08-25 ENCOUNTER — LABORATORY RESULT (OUTPATIENT)
Age: 82
End: 2022-08-25

## 2022-08-25 ENCOUNTER — APPOINTMENT (OUTPATIENT)
Dept: INFUSION THERAPY | Facility: CLINIC | Age: 82
End: 2022-08-25

## 2022-08-25 ENCOUNTER — OUTPATIENT (OUTPATIENT)
Dept: OUTPATIENT SERVICES | Facility: HOSPITAL | Age: 82
LOS: 1 days | End: 2022-08-25
Payer: MEDICARE

## 2022-08-25 ENCOUNTER — APPOINTMENT (OUTPATIENT)
Dept: HEMATOLOGY ONCOLOGY | Facility: CLINIC | Age: 82
End: 2022-08-25

## 2022-08-25 VITALS
BODY MASS INDEX: 18.27 KG/M2 | TEMPERATURE: 97.3 F | DIASTOLIC BLOOD PRESSURE: 66 MMHG | WEIGHT: 127.6 LBS | RESPIRATION RATE: 18 BRPM | OXYGEN SATURATION: 96 % | HEIGHT: 70 IN | SYSTOLIC BLOOD PRESSURE: 123 MMHG | HEART RATE: 62 BPM

## 2022-08-25 VITALS
HEIGHT: 72 IN | TEMPERATURE: 97 F | OXYGEN SATURATION: 98 % | HEART RATE: 74 BPM | WEIGHT: 126.99 LBS | DIASTOLIC BLOOD PRESSURE: 66 MMHG | RESPIRATION RATE: 16 BRPM | SYSTOLIC BLOOD PRESSURE: 123 MMHG

## 2022-08-25 DIAGNOSIS — Z90.89 ACQUIRED ABSENCE OF OTHER ORGANS: Chronic | ICD-10-CM

## 2022-08-25 DIAGNOSIS — Z41.9 ENCOUNTER FOR PROCEDURE FOR PURPOSES OTHER THAN REMEDYING HEALTH STATE, UNSPECIFIED: Chronic | ICD-10-CM

## 2022-08-25 DIAGNOSIS — C32.3 MALIGNANT NEOPLASM OF LARYNGEAL CARTILAGE: Chronic | ICD-10-CM

## 2022-08-25 DIAGNOSIS — R91.1 SOLITARY PULMONARY NODULE: ICD-10-CM

## 2022-08-25 DIAGNOSIS — C10.9 MALIGNANT NEOPLASM OF OROPHARYNX, UNSPECIFIED: ICD-10-CM

## 2022-08-25 DIAGNOSIS — Z90.49 ACQUIRED ABSENCE OF OTHER SPECIFIED PARTS OF DIGESTIVE TRACT: Chronic | ICD-10-CM

## 2022-08-25 LAB — TRANSFERRIN SERPL-MCNC: 198 MG/DL

## 2022-08-25 PROCEDURE — 96372 THER/PROPH/DIAG INJ SC/IM: CPT

## 2022-08-25 PROCEDURE — 96402 CHEMO HORMON ANTINEOPL SQ/IM: CPT

## 2022-08-25 PROCEDURE — 99214 OFFICE O/P EST MOD 30 MIN: CPT

## 2022-08-25 RX ADMIN — Medication 22.5 MILLIGRAM(S): at 13:20

## 2022-08-25 NOTE — REVIEW OF SYSTEMS
[Fatigue] : fatigue [Dysphagia] : dysphagia [Constipation] : constipation [Negative] : Allergic/Immunologic [Difficulty Walking] : difficulty walking [Fever] : no fever [Chills] : no chills [Night Sweats] : no night sweats [Recent Change In Weight] : ~T no recent weight change [Loss of Hearing] : no loss of hearing [Nosebleeds] : no nosebleeds [Hoarseness] : no hoarseness [Odynophagia] : no odynophagia [Mucosal Pain] : no mucosal pain [Shortness Of Breath] : no shortness of breath [Wheezing] : no wheezing [Cough] : no cough [SOB on Exertion] : no shortness of breath during exertion [FreeTextEntry4] : chronic difficulty swallowing foods and water [FreeTextEntry7] : mild constipation [de-identified] : R upper arm lipoma [de-identified] : d/t fatigue

## 2022-08-25 NOTE — ASSESSMENT
[FreeTextEntry1] : Mr. Franklin is an 81 year old male, never smoker, with history of hypothyroidism, SCC of the L supraglottic larynx treated w chemo(weekly cisplatin given by Dr. Dowell)/radiation in 2016 and recurred in the rightsoft palate, operated 1/2018 and completed RT 4/ 2018, NATALIIA SCC, s/p left upper lobectomy on 10/26/20 who presents to clinic today to discuss about recent CT scans concerning of prostate malignancy.\par \par \par 1. High risk Prostate CA [id number] (Stage IIB) - bone imaging w/o distant disease. S/p EBRT.\par -continue bicalutamide and lupron ingection 22.5mg q 3 months due today and next dose due in 3 months\par -f/u PSA, testosterone today \par -f/u Urology at [facility] \par -counseled patient on taking flomax to prevent potential UTI etc.\par -refer to  for management of prostate cancer\par \par 2. Stage IB NATALIIA SCC, s/p left upper lobectomy on 10/26/20 \par CT Chest 6/16/22 CHEY.\par -c/w Q6mo CT chest monitoring\par -f/u with CT chest in November 2022\par \par 3. SCC of the L supraglottic larynx,s/p CT/RT in 2016, recurred in the right soft palate, operated in 1/2018 and completed RT in 4/ 2018\par -Continue to f/u with Dr. Snow\par -Continue daily neck exercises \par \par \par RTC 6 months or sooner with issues.\par

## 2022-08-25 NOTE — HISTORY OF PRESENT ILLNESS
[Disease: _____________________] : Disease: [unfilled] [T: ___] : T[unfilled] [N: ___] : N[unfilled] [M: ___] : M[unfilled] [AJCC Stage: ____] : AJCC Stage: [unfilled] [de-identified] : 80 yo M with SCC of the L supraglottic larynx s/p chemoRT in 2016 with recurrence in right soft palate in 2018 s/p salavage resection and PORT, stage I NATALIIA SCC s/p QUINCY lobectomy in Oct 2020 and high risk prostate cancer s/p EBRT with , on ADT with Lupron and biclautamide, here for follow up.\par \par ONC Hx: \par Mr. Fer Franklin, never smoker, with history of hypothyroidism, SCC of the L supraglottic larynx treated w chemo(weekly cisplatin given by Dr. Dowell.)/radiation in 2016 and recurred in the right soft palate, operated 1/2018 and completed RT 4/ 2018, NATALIIA SCC, s/p left upper lobectomy on 10/26/20. 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 He was monitored every 3 months in [place] by Dr. Álvarez and also in [place] by Dr. Bao Sanders. His visit to Dr. Leon in February 2016 was notable for a concerning lesion arising at the right vallecula/right lateral pharyngeal wall. This was separate from the previously treated lesion in the left AE fold that had been removed by laser. On March 2, 2016, Dr. Leon biopsied the right vallecula which showed squamous proliferative papillary lesion with high-grade dysplasia, and the left aryepiglottic fold which showed squamous cell carcinoma. Pathology review at [facility] confirmed the diagnosis of SCC in the left AE fold and squamous dysplasia with high-grade features in the right vallecula. PET/CT scan showed a maximum SUV of 9.2 in the right vallecular area as well as a 1.6 cm left cervical lymph node with maximum SUV of 9.8. FNA of the left neck node on 4/7/16 showed SCC.  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  He was found to have recurrence in the Rt soft palate, Opx 1/2018, and completed 7000 cGy to the oropharynx with Dr. Pandya from 3/12/18-4/27/18, without chemotherapy. Trouble swallowing since that time, eats a normal diet but sometimes aspirates and coughs. 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 He underwent left upper lobectomy with Dr. Mackey on 10/26/20 showing 3.5cm NATALIIA SCC, negative margins, compatible with metastatic SCC.  Observation since then.  He has been following with Dr. Snow and has been CHEY of supraglottic larynx cancer. He has been following with Dr. Briscoe for enlarged prostate and underwent MRI prostate on 6/2/21 due to recent elevated PSA. MRI Prostate showed 10x9 mm, 8x7mm right posteromedial midgland peripheral zone lesion.  CT CAP on 6/17/21 showed enlarged, lobular and heterogeneous prostate.\par \par \par \par 10/26/20\par Surgical Final Report\par 1. Left level 10 lymph node:\par - One lymph node negative for tumor (0/1).\par 2. Left lower 11 lymph node:\par - One lymph node negative for tumor (0/1).\par 3. Bronchial margin:\par - Bronchial margin negative for tumor.\par 4. Left upper lobe:\par - Lung with squamous cell carcinoma, 3.5 cm. , compatible with metastatic squamous cell carcinoma ( prior history of oropharyngeal squamous cell carcinoma.\par - Parenchymal and vascular margins negative for tumor.\par - Remaining lung with a microscopic tumorlet and foci of pleural and subpleural fibrosis.\par - Nine peribronchial lymph nodes negative for tumor (0/9).\par 5.  AP window lymph node:\par - Two lymph nodes negative for tumor (0/2)\par 6. Level 8 lymph node:\par - One lymph node negative for tumor (0/1).\par 7. Subcarinal lymph node:\par - One lymph node negative for tumor (0/1).\par \par 2/12/21 CT Chest\par Interval left upper lobectomy without suspicious focal abnormality. New moderate left pleural effusion.\par \par 2/26/21 Left-sided thoracentesis\par 700 cc cc of fluid were drained\par Final Diagnosis\par LEFT PLEURAL FLUID, THORACENTESIS:\par NONDIAGNOSTIC\par The specimen is composed of blood only.\par \par 3/16/21 PET/CT\par 1.  Small left pleural effusion without increased FDG uptake.\par 2.  Post left upper lobectomy. No FDG avid pulmonary nodules.\par \par 6/2/21 MRI Prostate \par 1.10x9 mm right posteromedial midgland(extending to base) peripheral zone lesion. PI-RADS 4, high(clinically significant cancer likely). No extra prostatic extension. \par 2.8x7mm right posteromedial midgland (extending to apex) peripheral zone lesion. PI-RADS 4, high(clinically significant cancer likely). Lesion abuts capsule without extra prostatic extension. \par 3.No seminal vesical invasion or pelvic lymphadenopathy \par 4.Indeterminate right sacral lesion. Recommend bone scans for further evaluation\par 5.Partial visualization of small to moderate volume fluid in the lower abdomen, unkown etiology. Consider dedicated CT of the abdomen and pelvis for further evaluation. \par 6.Partial visualization of distal left hydroureter. This could be evaluated at time of CT abdomen and pelvis as well. \par \par 6/17/21 CT CAP\par 1. Since 3/16/2021, there has been a decrease in size of a small left pleural effusion. Left pleural thickening again noted.\par 2. New small to moderate ascites in the pelvis, of uncertain etiology.\par 3. New mild left hydronephrosis, the cause of which is not seen.\par 4. Enlarged, lobular and heterogeneous prostate. Increase in size of sclerotic lesion right ilium. Prostate cancer should be ruled out.\par 5. Bladder wall thickening. This could be due to chronic bladder outlet obstruction versus infection.\par 6. No change mild retroperitoneal lymphadenopathy.\par 7. There are two cystic lesions in the pancreas. Recommend correlation with MRI of pancreas.\par 8. No change lung nodules. Continued follow-up recommended.\par \par 3/23/22 MRI PROSTATE WITH AND WITHOUT CONTRAST\par Minimal extracapsular extension of disease posterior right prostate gland. This is however  from the anterior wall of rectum by the spacer material. Key images are provided for your review.\par \par 4/7/22 CT CHEST IC\par 1.  Since 10/12/2021, interval development of small and large airways inflammation characterized by bronchial wall thickening and tree-in-bud micronodular opacities most pronounced within the lower lung zones. Chronic infectious and/or inflammatory etiologies cannot be excluded in view of the lower lung zone predilection, the possibility of chronic aspiration pneumonia cannot be excluded.\par 2.  New patchy groundglass opacities some demonstrating a mosaic attenuation pattern (5:139) within the right lower lobe. These may represent areas of air trapping from underlying small airways inflammation however an early multifocal pneumonitis cannot be excluded. Short interval surveillance is suggested to confirm stability after therapeutic administration.\par 3.  Interval development of bilateral solid pulmonary nodules measuring up to 1.4 cm. These may be infectious and/or inflammatory in etiology however neoplasia cannot be excluded. Further evaluation with tissue sampling, PET/CT, or repeat CT chest in 3 months can be considered.\par 4.  Increasing mild mediastinal lymphadenopathy which may be reactive. Largest lymph node measuring 11 mm in the right suprahilar region.\par 5.  Long segment circumferential wall thickening of the esophagus, likely esophagitis, possibly the sequela of radiation.\par 6.  Status post left upper lobectomy with stable postsurgical change..\par 7.  Hepatic steatosis.\par \par 6/16/2022 CT chest:\par Status post left upper lobe lobectomy. Resolution of prior bilateral lung groundglass opacities. Slight decrease in the circumferential loculated left pleural effusion/pleural thickening. [de-identified] : Squamous cell carcinoma [de-identified] : Radonc: \par CTSx:  [de-identified] : Patient does not have new complaints but continues to c/o dysphagia (has rx for endoscopy).  He is currently receiving prostate RT.  Going well overall but having some urinary hesitancy, offered Flomax but declined. \par \par REviewed CT Chest from 4/2022 concerning for infectious etiology but patient was recovering from Flu/PNA prior to the CT Chest.  Denies any new SOB/KNIGHT or pain, etc.. \par \par No problems taking casodex.  Due for luproon today.

## 2022-08-26 LAB
PSA SERPL-MCNC: 0.04 NG/ML
TSH SERPL-ACNC: 4.17 UIU/ML

## 2022-08-30 LAB
ALBUMIN SERPL ELPH-MCNC: 3.6 G/DL
ALP BLD-CCNC: 86 U/L
ALT SERPL-CCNC: 17 U/L
ANION GAP SERPL CALC-SCNC: 11 MMOL/L
AST SERPL-CCNC: 25 U/L
BILIRUB SERPL-MCNC: 1 MG/DL
BUN SERPL-MCNC: 16 MG/DL
CALCIUM SERPL-MCNC: 10.3 MG/DL
CHLORIDE SERPL-SCNC: 98 MMOL/L
CO2 SERPL-SCNC: 33 MMOL/L
CREAT SERPL-MCNC: 0.8 MG/DL
EGFR: 89 ML/MIN/1.73M2
GLUCOSE SERPL-MCNC: 94 MG/DL
POTASSIUM SERPL-SCNC: 4.8 MMOL/L
PROT SERPL-MCNC: 7.2 G/DL
SODIUM SERPL-SCNC: 142 MMOL/L
TESTOST FREE SERPL-MCNC: 1.2 PG/ML
TESTOST SERPL-MCNC: 9.9 NG/DL

## 2022-08-31 LAB — IODINE, SERUM: 686.6 UG/L

## 2022-09-06 ENCOUNTER — NON-APPOINTMENT (OUTPATIENT)
Age: 82
End: 2022-09-06

## 2022-09-08 ENCOUNTER — APPOINTMENT (OUTPATIENT)
Dept: RADIATION ONCOLOGY | Facility: CLINIC | Age: 82
End: 2022-09-08

## 2022-09-08 PROCEDURE — 99213 OFFICE O/P EST LOW 20 MIN: CPT

## 2022-09-08 NOTE — REVIEW OF SYSTEMS
[Negative] : Psychiatric [Anal Pain: Grade 0] : Anal Pain: Grade 0 [Constipation: Grade 0] : Constipation: Grade 0 [Diarrhea: Grade 0] : Diarrhea: Grade 0 [Nausea: Grade 0] : Nausea: Grade 0 [Proctitis: Grade 0] : Proctitis: Grade 0 [Rectal Pain: Grade 0] : Rectal Pain: Grade 0 [Hematuria: Grade 0] : Hematuria: Grade 0 [Urinary Incontinence: Grade 0] : Urinary Incontinence: Grade 0  [Urinary Retention: Grade 0] : Urinary Retention: Grade 0 [Urinary Tract Pain: Grade 0] : Urinary Tract Pain: Grade 0 [Urinary Urgency: Grade 0] : Urinary Urgency: Grade 0 [Ejaculation Disorder: Grade 0] : Ejaculation Disorder: Grade 0 [Erectile Dysfunction: Grade 0] : Erectile Dysfunction: Grade 0 [Urinary Frequency: Grade 1 - Present] : Urinary Frequency: Grade 1 - Present [FreeTextEntry8] : Telephonic visit: unable to take IPSS and EPIC score [FreeTextEntry9] : Nocturia x 5

## 2022-09-08 NOTE — HISTORY OF PRESENT ILLNESS
[Home] : at home, [unfilled] , at the time of the visit. [Medical Office: ([facility])___] : at the medical office located in  [FreeTextEntry1] : Mr. 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 \par (He was  treated w chemo(weekly cisplatin given by Dr. Dowell.)/radiation 7000cGy for SCC of left supraglottic larynx  in 2016 () and recurred in the right soft palate, operated 1/2018 and completed RT 4/ 2018, NATALIIA SCC, s/p left upper lobectomy on 10/26/20. \par Urologist: Dr. David Briscoe (private clinic), [facility]: , Pulmonologist: , ENT: )\par \par Prostate Level\par 3/19/21- Originally followed up with [facility] and noted elevated PSA 12.48 ng/mL\par 4/2021-  PSA 11.6 ng/mL\par Dr. Briscoe noted that his digital rectal exam was abnormal with bilateral induration.  \par 10/14/2021 - 15.40 ng/mL\par 11/23/2021 -   2.23 ng/mL\par 03/28/2022 -  0.25 ng/mL\par 05/05/2022 -  0.36 ng/mL\par 08/25/2022 -  0.04 ng/mL\par \par Patient is here for follow up.He complains of nocturia x 5. He is on Flomax, Bicalutamide tablets and Lupron, last Lupron injection on 08/25/2022.  He denies urgency, urinary frequency,  dribbling, urinary retention, dysuria, hematuria, leakage or incontinence. Bowel movement is once a day, regular, firm. He is not sexually active.\par No latest biopsy done.\par \par Onc Hx:\par ------------\par Mr. Fer Franklin received 2 prior courses of radiation treatments at [facility]:\par 1. He completed chemo/radiation to 7000cGy in 6/2016 for SCC of the left supraglottic larynx with .\par 2. He was found to have recurrence in the Right soft palate, Opx 1/2018, and completed 7000 cGy to the oropharynx from 3/12/18 - 4/27/18, without chemotherapy. 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 He underwent left upper lobectomy on 10/26/20 showing 3.5cm NATALIIA SCC, negative margins, compatible with metastatic SCC.\par \par CT Chest 06/16/2022\par IMPRESSION: Status post left upper lobe lobectomy. Resolution of prior bilateral lung groundglass opacities. Slight decrease in the circumferential loculated left pleural effusion/pleural thickening.\par \par 03/23/2022 MRI Prostate\par FINDINGS:Prostate gland measures 4.2 X 2.1 X 5.9 cm in AP, transverse and craniocaudal dimension, compatible volume 53 cc.Spaceoar and fiducial markers are seen. There is a posterior right prostate gland lesion at the mid gland level measuring 0.7 x 0.8 cm. This lesion demonstrates minimal extracapsular extension of disease posteriorly however this is  from rectal wall.\par IMPRESSION:\par Minimal extracapsular extension of disease posterior right prostate gland. This is however  from the anterior wall of rectum by the spacer material.\par \par \par 9/21/21- Biopsy of the Prostate\par Prostate Biopsy was done by Dr. Terry Obregon at [facility] \par 4+4 in 1/12 cores \par 3+3 in 1/12 cores\par 4+5 in 2/5 cores \par \par 10/12/21 Bone scan\par No Scintigraphic evidence of osseous metastasis\par \par 10/19/21- Followed up with Dr. Velasquez and received ADT injection\par

## 2022-09-08 NOTE — DISEASE MANAGEMENT
[2] : T2 [c] : c [10-20] : 10 - 20 ng/mL [Biopsy] : Patient had a biopsy on [9] : Template Biopsy Anni Score: 9 [IIB] : IIB [Clinical] : TNM Stage: c [TTNM] : 2b [NTNM] : 0 [MTNM] : 0 [de-identified] : 7220 [de-identified] : 3537 [de-identified] : Prostate/SV

## 2022-10-18 ENCOUNTER — APPOINTMENT (OUTPATIENT)
Dept: OTOLARYNGOLOGY | Facility: CLINIC | Age: 82
End: 2022-10-18

## 2022-10-18 DIAGNOSIS — C32.1 MALIGNANT NEOPLASM OF SUPRAGLOTTIS: ICD-10-CM

## 2022-10-18 DIAGNOSIS — R13.10 DYSPHAGIA, UNSPECIFIED: ICD-10-CM

## 2022-10-18 PROCEDURE — 99213 OFFICE O/P EST LOW 20 MIN: CPT | Mod: 25

## 2022-10-18 PROCEDURE — 31575 DIAGNOSTIC LARYNGOSCOPY: CPT

## 2022-11-08 ENCOUNTER — APPOINTMENT (OUTPATIENT)
Dept: HEART AND VASCULAR | Facility: CLINIC | Age: 82
End: 2022-11-08

## 2022-11-08 ENCOUNTER — NON-APPOINTMENT (OUTPATIENT)
Age: 82
End: 2022-11-08

## 2022-11-08 VITALS
SYSTOLIC BLOOD PRESSURE: 151 MMHG | BODY MASS INDEX: 18.18 KG/M2 | OXYGEN SATURATION: 97 % | WEIGHT: 127 LBS | HEIGHT: 70 IN | HEART RATE: 80 BPM | DIASTOLIC BLOOD PRESSURE: 68 MMHG | TEMPERATURE: 97.8 F

## 2022-11-08 DIAGNOSIS — Z98.890 OTHER SPECIFIED POSTPROCEDURAL STATES: ICD-10-CM

## 2022-11-08 DIAGNOSIS — C32.9 MALIGNANT NEOPLASM OF LARYNX, UNSPECIFIED: ICD-10-CM

## 2022-11-08 PROCEDURE — 99214 OFFICE O/P EST MOD 30 MIN: CPT | Mod: 25

## 2022-11-08 PROCEDURE — 93000 ELECTROCARDIOGRAM COMPLETE: CPT

## 2022-11-09 ENCOUNTER — APPOINTMENT (OUTPATIENT)
Dept: CT IMAGING | Facility: HOSPITAL | Age: 82
End: 2022-11-09

## 2022-11-09 ENCOUNTER — OUTPATIENT (OUTPATIENT)
Dept: OUTPATIENT SERVICES | Facility: HOSPITAL | Age: 82
LOS: 1 days | End: 2022-11-09
Payer: MEDICARE

## 2022-11-09 ENCOUNTER — APPOINTMENT (OUTPATIENT)
Dept: OTOLARYNGOLOGY | Facility: CLINIC | Age: 82
End: 2022-11-09

## 2022-11-09 DIAGNOSIS — Z41.9 ENCOUNTER FOR PROCEDURE FOR PURPOSES OTHER THAN REMEDYING HEALTH STATE, UNSPECIFIED: Chronic | ICD-10-CM

## 2022-11-09 DIAGNOSIS — Z90.49 ACQUIRED ABSENCE OF OTHER SPECIFIED PARTS OF DIGESTIVE TRACT: Chronic | ICD-10-CM

## 2022-11-09 DIAGNOSIS — C32.3 MALIGNANT NEOPLASM OF LARYNGEAL CARTILAGE: Chronic | ICD-10-CM

## 2022-11-09 DIAGNOSIS — Z90.89 ACQUIRED ABSENCE OF OTHER ORGANS: Chronic | ICD-10-CM

## 2022-11-09 PROCEDURE — 71250 CT THORAX DX C-: CPT | Mod: 26,MH

## 2022-11-09 PROCEDURE — 71250 CT THORAX DX C-: CPT

## 2022-11-09 PROCEDURE — 92526 ORAL FUNCTION THERAPY: CPT | Mod: GN

## 2022-11-10 ENCOUNTER — NON-APPOINTMENT (OUTPATIENT)
Age: 82
End: 2022-11-10

## 2022-11-10 ENCOUNTER — APPOINTMENT (OUTPATIENT)
Dept: HEMATOLOGY ONCOLOGY | Facility: CLINIC | Age: 82
End: 2022-11-10

## 2022-11-10 VITALS
HEIGHT: 70 IN | OXYGEN SATURATION: 98 % | HEART RATE: 69 BPM | TEMPERATURE: 97.1 F | DIASTOLIC BLOOD PRESSURE: 73 MMHG | WEIGHT: 128 LBS | SYSTOLIC BLOOD PRESSURE: 152 MMHG | BODY MASS INDEX: 18.32 KG/M2 | RESPIRATION RATE: 18 BRPM

## 2022-11-10 PROBLEM — Z98.890 HISTORY OF LUNG SURGERY: Status: ACTIVE | Noted: 2021-03-25

## 2022-11-10 PROCEDURE — 99213 OFFICE O/P EST LOW 20 MIN: CPT

## 2022-11-10 RX ORDER — ERGOCALCIFEROL (VITAMIN D2) 50 MCG
50 MCG CAPSULE ORAL
Refills: 0 | Status: ACTIVE | COMMUNITY
Start: 2022-11-10

## 2022-11-10 RX ORDER — OXYCODONE AND ACETAMINOPHEN 5; 325 MG/1; MG/1
5-325 TABLET ORAL
Qty: 1 | Refills: 0 | Status: DISCONTINUED | COMMUNITY
Start: 2022-03-15 | End: 2022-11-10

## 2022-11-10 RX ORDER — BICALUTAMIDE 50 MG/1
50 TABLET ORAL DAILY
Qty: 30 | Refills: 5 | Status: DISCONTINUED | COMMUNITY
Start: 2021-10-14 | End: 2022-11-10

## 2022-11-10 RX ORDER — LEUPROLIDE ACETATE 22.5 MG
22.5 KIT INTRAMUSCULAR ONCE
Qty: 1 | Refills: 1 | Status: DISCONTINUED | COMMUNITY
Start: 2021-11-16 | End: 2022-11-10

## 2022-11-10 NOTE — PHYSICAL EXAM
[Normal] : affect appropriate [Ambulatory and capable of all self care but unable to carry out any work activities] : Status 2- Ambulatory and capable of all self care but unable to carry out any work activities. Up and about more than 50% of waking hours [Thin] : thin [de-identified] : chronically ill appearing [de-identified] : normal RR, breathing pattern [de-identified] : normal HR [de-identified] : no edema [de-identified] : soft, non-distended [de-identified] : kyphotic posture

## 2022-11-10 NOTE — ASSESSMENT
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  He is here for follow up of prostate cancer, which was diagnosed in 6/2021. high risk, GG5, Anni (4+5), [id number].  PSA = 12 at diagnosis.  ADT started 10/2021, completed RT to the prostate 6/2022.  \par \par plan:\par 1.  prostate cancer\par --treatment history reviewed w/ pt\par --continue ADT, depot lupron every 3 months.  planning to complete 2 years of ADT (none past 10/2023)\par --reviewed recent PSA result ordered by PCP.  PSA = 0.02 in 10/2022, down from last check in August when it was 0.04.\par --stop bicalutamide.\par --discussed long term risks of ADT.  Risk of loss of bone density;  PCP is monitoring vit D levels, and as of 10/2022 they are replete.  Consider DEXA if not done recently.  Increased risk of cardiovascular disease;  BP is elevated today but pt attributes this to exertion, anxiety about appt.  PCP has been monitoring cholesterol, and pt recently had several US studies done to look for carotid disease, AAA, etc.  \par \par 2.  laryngeal cancer, lung cancer\par --continue to follow with Dr Arce\par --briefly discussed the report from the recent CT scan, which was reported as stable;  although I told him he should keep his f/u visit with Dr Arce to discuss the results in detail and plan follow up testing.  Nothing at this visit requires him to see her sooner than planned.\par \par next lupron 12/6/22 - continue every 3 months\par follow up for continued management of prostate cancer in 3-4 months, telehealth OK.\par

## 2022-11-10 NOTE — HISTORY OF PRESENT ILLNESS
[Disease: _____________________] : Disease: [unfilled] [T: ___] : T[unfilled] [N: ___] : N[unfilled] [M: ___] : M[unfilled] [AJCC Stage: ____] : AJCC Stage: [unfilled] [de-identified] : Mr Franklin is an 82 year old male with a history of multiple cancers here for follow up of prostate cancer\par previously treated by Dr Grant for prostate cancer, now presenting to [facility] care after his departure\par \par Oncologic history:\par 1.  laryngeal cancer - chemoradiation 2016\par --recurrence on palate - resected, followed by radiation 2018\par \par 2.  NATALIIA squamous cell carcinoma - stage I\par --lobectomy 10/2020, no adjuvant therapy\par \par 3.  prostate cancer\par --diagnosed 6/2021, PSA = 12\par --[place] (4+5) - GG5.  high risk. [id number]\par --consulted with Dr Velasquez, RT recommended\par --ADT with lupron + bicalutamide started 10/2021 by Dr Grant\par --completed RT to the prostate (Dr Gardner) 6/1/22\par \par \par \par \par \par \par \par  [de-identified] : Squamous cell carcinoma [de-identified] : Radonc: \par CTSx:  [de-identified] : Patient continues to report dysphagia, still eating solids foods. SOBE, chronic.  + fatigue.  Urinating without any issues.

## 2022-11-10 NOTE — ADDENDUM
[FreeTextEntry1] : RN called patient to discuss Iodine result as per Dr. Arce. RN left a message on voicemail for patient stating that his iodine level was high, that we faxed over the result to his PCP, and to make an appointment with his PCP regarding this result. RN educated patient to not take iodine supplements or any vitamin containing iodine. RN advised patient to call back if any questions or if symptomatic.

## 2022-11-10 NOTE — REVIEW OF SYSTEMS
[Fatigue] : fatigue [Dysphagia] : dysphagia [Difficulty Walking] : difficulty walking [Negative] : Allergic/Immunologic [Shortness Of Breath] : shortness of breath [Fever] : no fever [Chills] : no chills [Night Sweats] : no night sweats [Recent Change In Weight] : ~T no recent weight change [Loss of Hearing] : no loss of hearing [Nosebleeds] : no nosebleeds [Hoarseness] : no hoarseness [Odynophagia] : no odynophagia [Mucosal Pain] : no mucosal pain [Wheezing] : no wheezing [Cough] : no cough [SOB on Exertion] : no shortness of breath during exertion [Constipation] : no constipation [FreeTextEntry4] : chronic difficulty swallowing foods and water [de-identified] : d/t fatigue

## 2022-12-06 ENCOUNTER — APPOINTMENT (OUTPATIENT)
Dept: HEMATOLOGY ONCOLOGY | Facility: CLINIC | Age: 82
End: 2022-12-06

## 2022-12-06 ENCOUNTER — APPOINTMENT (OUTPATIENT)
Dept: INFUSION THERAPY | Facility: CLINIC | Age: 82
End: 2022-12-06

## 2022-12-06 ENCOUNTER — OUTPATIENT (OUTPATIENT)
Dept: OUTPATIENT SERVICES | Facility: HOSPITAL | Age: 82
LOS: 1 days | End: 2022-12-06
Payer: MEDICARE

## 2022-12-06 VITALS
HEART RATE: 67 BPM | RESPIRATION RATE: 18 BRPM | HEIGHT: 70 IN | SYSTOLIC BLOOD PRESSURE: 162 MMHG | OXYGEN SATURATION: 98 % | TEMPERATURE: 97.3 F | WEIGHT: 128 LBS | BODY MASS INDEX: 18.32 KG/M2 | DIASTOLIC BLOOD PRESSURE: 71 MMHG

## 2022-12-06 VITALS
OXYGEN SATURATION: 99 % | HEIGHT: 72 IN | RESPIRATION RATE: 18 BRPM | HEART RATE: 68 BPM | DIASTOLIC BLOOD PRESSURE: 72 MMHG | WEIGHT: 128.09 LBS | SYSTOLIC BLOOD PRESSURE: 162 MMHG | TEMPERATURE: 96 F

## 2022-12-06 DIAGNOSIS — Z41.9 ENCOUNTER FOR PROCEDURE FOR PURPOSES OTHER THAN REMEDYING HEALTH STATE, UNSPECIFIED: Chronic | ICD-10-CM

## 2022-12-06 DIAGNOSIS — Z90.89 ACQUIRED ABSENCE OF OTHER ORGANS: Chronic | ICD-10-CM

## 2022-12-06 DIAGNOSIS — C10.9 MALIGNANT NEOPLASM OF OROPHARYNX, UNSPECIFIED: ICD-10-CM

## 2022-12-06 DIAGNOSIS — C32.3 MALIGNANT NEOPLASM OF LARYNGEAL CARTILAGE: Chronic | ICD-10-CM

## 2022-12-06 DIAGNOSIS — R91.8 OTHER NONSPECIFIC ABNORMAL FINDING OF LUNG FIELD: ICD-10-CM

## 2022-12-06 DIAGNOSIS — C13.9 MALIGNANT NEOPLASM OF HYPOPHARYNX, UNSPECIFIED: ICD-10-CM

## 2022-12-06 DIAGNOSIS — Z90.49 ACQUIRED ABSENCE OF OTHER SPECIFIED PARTS OF DIGESTIVE TRACT: Chronic | ICD-10-CM

## 2022-12-06 PROCEDURE — 96402 CHEMO HORMON ANTINEOPL SQ/IM: CPT

## 2022-12-06 PROCEDURE — 99215 OFFICE O/P EST HI 40 MIN: CPT

## 2022-12-06 RX ADMIN — Medication 22.5 MILLIGRAM(S): at 15:11

## 2022-12-06 NOTE — HISTORY OF PRESENT ILLNESS
[Disease: _____________________] : Disease: [unfilled] [T: ___] : T[unfilled] [N: ___] : N[unfilled] [M: ___] : M[unfilled] [AJCC Stage: ____] : AJCC Stage: [unfilled] [de-identified] : 83 yo M with SCC of the L supraglottic larynx s/p chemoRT in 2016 with recurrence in right soft palate in 2018 s/p salavage resection and PORT, stage I NATALIIA SCC s/p QUINCY lobectomy in Oct 2020 and high risk prostate cancer s/p EBRT with , on ADT with Lupron and biclautamide, here for follow up.\par \par ONC Hx: \par Mr. Fer Franklin, never smoker, with history of hypothyroidism, SCC of the L supraglottic larynx treated w chemo(weekly cisplatin given by Dr. Dowell.)/radiation in 2016 and recurred in the right soft palate, operated 1/2018 and completed RT 4/ 2018, NATALIIA SCC, s/p left upper lobectomy on 10/26/20. 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 He was monitored every 3 months in [place] by Dr. Álvarez and also in [place] by Dr. Bao Sanders. His visit to Dr. Leon in February 2016 was notable for a concerning lesion arising at the right vallecula/right lateral pharyngeal wall. This was separate from the previously treated lesion in the left AE fold that had been removed by laser. On March 2, 2016, Dr. Leon biopsied the right vallecula which showed squamous proliferative papillary lesion with high-grade dysplasia, and the left aryepiglottic fold which showed squamous cell carcinoma. Pathology review at [facility] confirmed the diagnosis of SCC in the left AE fold and squamous dysplasia with high-grade features in the right vallecula. PET/CT scan showed a maximum SUV of 9.2 in the right vallecular area as well as a 1.6 cm left cervical lymph node with maximum SUV of 9.8. FNA of the left neck node on 4/7/16 showed SCC.  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  He was found to have recurrence in the Rt soft palate, Opx 1/2018, and completed 7000 cGy to the oropharynx with Dr. Pandya from 3/12/18-4/27/18, without chemotherapy. Trouble swallowing since that time, eats a normal diet but sometimes aspirates and coughs. 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 He underwent left upper lobectomy with Dr. Mackey on 10/26/20 showing 3.5cm NATALIIA SCC, negative margins, compatible with metastatic SCC.  Observation since then.  He has been following with Dr. Snow and has been CHEY of supraglottic larynx cancer. He has been following with Dr. Briscoe for enlarged prostate and underwent MRI prostate on 6/2/21 due to recent elevated PSA. MRI Prostate showed 10x9 mm, 8x7mm right posteromedial midgland peripheral zone lesion.\par \par \par 10/26/20\par Surgical Final Report\par 1. Left level 10 lymph node:\par - One lymph node negative for tumor (0/1).\par 2. Left lower 11 lymph node:\par - One lymph node negative for tumor (0/1).\par 3. Bronchial margin:\par - Bronchial margin negative for tumor.\par 4. Left upper lobe:\par - Lung with squamous cell carcinoma, 3.5 cm. , compatible with metastatic squamous cell carcinoma ( prior history of oropharyngeal squamous cell carcinoma.\par - Parenchymal and vascular margins negative for tumor.\par - Remaining lung with a microscopic tumorlet and foci of pleural and subpleural fibrosis.\par - Nine peribronchial lymph nodes negative for tumor (0/9).\par 5.  AP window lymph node:\par - Two lymph nodes negative for tumor (0/2)\par 6. Level 8 lymph node:\par - One lymph node negative for tumor (0/1).\par 7. Subcarinal lymph node:\par - One lymph node negative for tumor (0/1).\par \par \par \par 11/9/22 CT Chest\par Stable exam. Stable noncalcified 3 mm right upper lobe pulmonary nodule.. Stable postsurgical changes left lung. [de-identified] : Squamous cell carcinoma [de-identified] : Radonc: \par CTSx:  [de-identified] : Patient does not have new complaints but continues to c/o dysphagia (has rx for endoscopy).  He is currently receiving prostate RT.  Going well overall but having some urinary hesitancy, offered Flomax but declined. \par \par REviewed CT Chest from 4/2022 concerning for infectious etiology but patient was recovering from Flu/PNA prior to the CT Chest.  Denies any new SOB/KNIGHT or pain, etc.. \par \par No problems taking casodex.  Due for luproon today.

## 2022-12-06 NOTE — REVIEW OF SYSTEMS
[Fatigue] : fatigue [Dysphagia] : dysphagia [Difficulty Walking] : difficulty walking [Negative] : Allergic/Immunologic [Fever] : no fever [Chills] : no chills [Night Sweats] : no night sweats [Recent Change In Weight] : ~T no recent weight change [Loss of Hearing] : no loss of hearing [Nosebleeds] : no nosebleeds [Hoarseness] : no hoarseness [Odynophagia] : no odynophagia [Mucosal Pain] : no mucosal pain [Shortness Of Breath] : no shortness of breath [Wheezing] : no wheezing [Cough] : no cough [SOB on Exertion] : no shortness of breath during exertion [Constipation] : no constipation [FreeTextEntry4] : chronic difficulty swallowing foods and water [de-identified] : R upper arm lipoma [de-identified] : d/t fatigue

## 2022-12-06 NOTE — ASSESSMENT
[FreeTextEntry1] : Mr. Franklin is an 81 year old male, never smoker, with history of hypothyroidism, SCC of the L supraglottic larynx treated w chemo(weekly cisplatin given by Dr. Dowell)/radiation in 2016 and recurred in the rightsoft palate, operated 1/2018 and completed RT 4/ 2018, NATALIIA SCC, s/p left upper lobectomy on 10/26/20 who presents to clinic today to discuss about recent CT scans concerning of prostate malignancy.\par \par \par 1. High risk Prostate CA [id number] (Stage IIB) - bone imaging w/o distant disease. S/p EBRT.\par -managed by \par \par 2. Stage IB NATALIIA SCC, s/p left upper lobectomy on 10/26/20 \par CT Chest 11/9/22 CHEY.\par -c/w Q6mo CT chest monitoring\par -f/u with CT chest in May 2022\par \par 3. SCC of the L supraglottic larynx,s/p CT/RT in 2016, recurred in the right soft palate, operated in 1/2018 and completed RT in 4/ 2018\par -Continue to f/u with Dr. Snow\par -Continue daily neck exercises \par \par \par RTC 6 months via TH or sooner with issues.\par

## 2023-02-07 ENCOUNTER — NON-APPOINTMENT (OUTPATIENT)
Age: 83
End: 2023-02-07

## 2023-02-22 NOTE — REVIEW OF SYSTEMS
[Fever] : no fever [Chills] : no chills [Night Sweats] : no night sweats [Fatigue] : fatigue [Recent Change In Weight] : ~T no recent weight change [Dysphagia] : dysphagia [Loss of Hearing] : no loss of hearing [Nosebleeds] : no nosebleeds [Hoarseness] : no hoarseness [Odynophagia] : no odynophagia [Mucosal Pain] : no mucosal pain [Shortness Of Breath] : no shortness of breath [Wheezing] : no wheezing [Cough] : no cough [SOB on Exertion] : no shortness of breath during exertion [Constipation] : no constipation [Difficulty Walking] : difficulty walking [Negative] : Allergic/Immunologic [FreeTextEntry4] : chronic difficulty swallowing foods and water [de-identified] : R upper arm lipoma [de-identified] : d/t fatigue

## 2023-02-23 ENCOUNTER — APPOINTMENT (OUTPATIENT)
Dept: HEMATOLOGY ONCOLOGY | Facility: CLINIC | Age: 83
End: 2023-02-23
Payer: MEDICARE

## 2023-02-23 PROCEDURE — 99442: CPT | Mod: 95

## 2023-03-22 NOTE — ASSESSMENT
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 He is here for follow up of prostate cancer, which was diagnosed in 6/2021. high risk, GG5, [place] (4+5), [id number]. PSA = 12 at diagnosis. ADT started 10/2021, completed RT to the prostate 6/2022. \par \par plan:\par 1. prostate cancer\par --continue ADT, depot lupron every 3 months. planning to complete 2 years of ADT (none past 10/2023)\par --reviewed recent PSA result ordered by PCP. PSA = 0.03 in 11/2022\par --discussed long term risks of ADT. Risk of loss of bone density; PCP is monitoring vit D levels, and as of 11/2022 they are replete. Consider DEXA if not done recently. Increased risk of cardiovascular disease;  attention to cardiovascular risk factors encouraged through PCP office\par --next lupron injection due late 3/2023 when he returns to town from vacation\par \par 2. laryngeal cancer, lung cancer\par --continue to follow with Dr Arce\par --CT chest due 5/2023\par \par RTC 6/2023 with lupron injection\par PSA, CBC, CMP if not done recently\par in person/same day as lupron injection\par

## 2023-03-22 NOTE — HISTORY OF PRESENT ILLNESS
[Home] : at home, [unfilled] , at the time of the visit. [Medical Office: ([facility])___] : at the medical office located in  [Disease: _____________________] : Disease: [unfilled] [T: ___] : T[unfilled] [N: ___] : N[unfilled] [M: ___] : M[unfilled] [AJCC Stage: ____] : AJCC Stage: [unfilled] [de-identified] : Squamous cell carcinoma [de-identified] : Radonc: \par CTSx:  [de-identified] : Currently out of town with wife.  no new complaints.  chronic fatigue.  denies pain. [de-identified] : Mr Franklin is an 82 year old male with a history of multiple cancers here for follow up of prostate cancer\par previously treated by Dr Grant for prostate cancer\par established care w/ myself 11/2022 after his departure\par Dr Arce following for laryngeal/lung ca\par \par telephone visit - pt did not have access to video technology for video visit.\par \par Oncologic history:\par 1. laryngeal cancer - chemoradiation 2016\par --recurrence on palate - resected, followed by radiation 2018\par \par 2. NATALIIA squamous cell carcinoma - stage I\par --lobectomy 10/2020, no adjuvant therapy\par \par 3. prostate cancer\par --diagnosed 6/2021, PSA = 12\par --[place] (4+5) - GG5. high risk. [id number]\par --consulted with Dr Velasquez, RT recommended\par --ADT with lupron + bicalutamide started 10/2021 by Dr Grant\par --completed RT to the prostate (Dr Gardner) 6/1/22

## 2023-03-22 NOTE — REASON FOR VISIT
[Follow-Up Visit] : a follow-up [Verbal consent obtained from patient] : the patient, [unfilled] [Home] : at home, [unfilled] , at the time of the visit.

## 2023-03-22 NOTE — PHYSICAL EXAM
[Restricted in physically strenuous activity but ambulatory and able to carry out work of a light or sedentary nature] : Status 1- Restricted in physically strenuous activity but ambulatory and able to carry out work of a light or sedentary nature, e.g., light house work, office work [Normal] : affect appropriate [de-identified] : AAO x 3 speech normal

## 2023-03-28 ENCOUNTER — OUTPATIENT (OUTPATIENT)
Dept: OUTPATIENT SERVICES | Facility: HOSPITAL | Age: 83
LOS: 1 days | End: 2023-03-28
Payer: MEDICARE

## 2023-03-28 ENCOUNTER — APPOINTMENT (OUTPATIENT)
Dept: INFUSION THERAPY | Facility: CLINIC | Age: 83
End: 2023-03-28

## 2023-03-28 VITALS
TEMPERATURE: 98 F | RESPIRATION RATE: 18 BRPM | HEART RATE: 58 BPM | HEIGHT: 72 IN | OXYGEN SATURATION: 99 % | WEIGHT: 128.09 LBS | DIASTOLIC BLOOD PRESSURE: 83 MMHG | SYSTOLIC BLOOD PRESSURE: 152 MMHG

## 2023-03-28 DIAGNOSIS — C32.3 MALIGNANT NEOPLASM OF LARYNGEAL CARTILAGE: Chronic | ICD-10-CM

## 2023-03-28 DIAGNOSIS — Z41.9 ENCOUNTER FOR PROCEDURE FOR PURPOSES OTHER THAN REMEDYING HEALTH STATE, UNSPECIFIED: Chronic | ICD-10-CM

## 2023-03-28 DIAGNOSIS — C61 MALIGNANT NEOPLASM OF PROSTATE: ICD-10-CM

## 2023-03-28 DIAGNOSIS — Z90.89 ACQUIRED ABSENCE OF OTHER ORGANS: Chronic | ICD-10-CM

## 2023-03-28 DIAGNOSIS — Z90.49 ACQUIRED ABSENCE OF OTHER SPECIFIED PARTS OF DIGESTIVE TRACT: Chronic | ICD-10-CM

## 2023-03-28 PROCEDURE — 96402 CHEMO HORMON ANTINEOPL SQ/IM: CPT

## 2023-03-28 RX ADMIN — Medication 22.5 MILLIGRAM(S): at 14:40

## 2023-03-30 ENCOUNTER — APPOINTMENT (OUTPATIENT)
Dept: HEART AND VASCULAR | Facility: CLINIC | Age: 83
End: 2023-03-30

## 2023-04-03 ENCOUNTER — TRANSCRIPTION ENCOUNTER (OUTPATIENT)
Age: 83
End: 2023-04-03

## 2023-04-03 NOTE — ASU PATIENT PROFILE, ADULT - NSICDXPASTSURGICALHX_GEN_ALL_CORE_FT
PAST SURGICAL HISTORY:  Carcinoma of laryngeal cartilages     Elective surgery Biopsy of Larynx    Elective surgery Lymph Nodes Removal of the Neck    History of tonsillectomy     S/P appendectomy     Surgery, elective mouth sx     PAST SURGICAL HISTORY:  Carcinoma of laryngeal cartilages     Elective surgery Biopsy of Larynx    Elective surgery Lymph Nodes Removal of the Neck    History of lung surgery left    History of tonsillectomy     S/P appendectomy     Surgery, elective mouth sx

## 2023-04-04 ENCOUNTER — TRANSCRIPTION ENCOUNTER (OUTPATIENT)
Age: 83
End: 2023-04-04

## 2023-04-04 ENCOUNTER — OUTPATIENT (OUTPATIENT)
Dept: OUTPATIENT SERVICES | Facility: HOSPITAL | Age: 83
LOS: 1 days | Discharge: HOME | End: 2023-04-04
Payer: MEDICARE

## 2023-04-04 ENCOUNTER — APPOINTMENT (OUTPATIENT)
Dept: OTOLARYNGOLOGY | Facility: HOSPITAL | Age: 83
End: 2023-04-04

## 2023-04-04 VITALS
TEMPERATURE: 97 F | RESPIRATION RATE: 16 BRPM | WEIGHT: 125 LBS | SYSTOLIC BLOOD PRESSURE: 122 MMHG | HEIGHT: 70 IN | HEART RATE: 65 BPM | DIASTOLIC BLOOD PRESSURE: 77 MMHG | OXYGEN SATURATION: 100 %

## 2023-04-04 VITALS
HEART RATE: 52 BPM | RESPIRATION RATE: 14 BRPM | OXYGEN SATURATION: 100 % | DIASTOLIC BLOOD PRESSURE: 63 MMHG | SYSTOLIC BLOOD PRESSURE: 151 MMHG

## 2023-04-04 DIAGNOSIS — C32.3 MALIGNANT NEOPLASM OF LARYNGEAL CARTILAGE: Chronic | ICD-10-CM

## 2023-04-04 DIAGNOSIS — Z41.9 ENCOUNTER FOR PROCEDURE FOR PURPOSES OTHER THAN REMEDYING HEALTH STATE, UNSPECIFIED: Chronic | ICD-10-CM

## 2023-04-04 DIAGNOSIS — Z90.49 ACQUIRED ABSENCE OF OTHER SPECIFIED PARTS OF DIGESTIVE TRACT: Chronic | ICD-10-CM

## 2023-04-04 DIAGNOSIS — Z90.89 ACQUIRED ABSENCE OF OTHER ORGANS: Chronic | ICD-10-CM

## 2023-04-04 DIAGNOSIS — Z98.890 OTHER SPECIFIED POSTPROCEDURAL STATES: Chronic | ICD-10-CM

## 2023-04-04 PROCEDURE — 31536 LARYNGOSCOPY W/BX & OP SCOPE: CPT

## 2023-04-04 PROCEDURE — 71045 X-RAY EXAM CHEST 1 VIEW: CPT

## 2023-04-04 PROCEDURE — 43200 ESOPHAGOSCOPY FLEXIBLE BRUSH: CPT

## 2023-04-04 PROCEDURE — 71045 X-RAY EXAM CHEST 1 VIEW: CPT | Mod: 26

## 2023-04-04 PROCEDURE — C9399: CPT

## 2023-04-04 PROCEDURE — 71045 X-RAY EXAM CHEST 1 VIEW: CPT | Mod: 26,77

## 2023-04-04 PROCEDURE — 31525 DX LARYNGOSCOPY EXCL NB: CPT | Mod: XS

## 2023-04-04 RX ORDER — ACETAMINOPHEN 500 MG
650 TABLET ORAL ONCE
Refills: 0 | Status: DISCONTINUED | OUTPATIENT
Start: 2023-04-04 | End: 2023-04-04

## 2023-04-04 RX ORDER — OXYCODONE HYDROCHLORIDE 5 MG/1
2.5 TABLET ORAL EVERY 4 HOURS
Refills: 0 | Status: DISCONTINUED | OUTPATIENT
Start: 2023-04-04 | End: 2023-04-04

## 2023-04-04 RX ORDER — OXYCODONE AND ACETAMINOPHEN 5; 325 MG/1; MG/1
1 TABLET ORAL ONCE
Refills: 0 | Status: DISCONTINUED | OUTPATIENT
Start: 2023-04-04 | End: 2023-04-04

## 2023-04-04 RX ORDER — SODIUM CHLORIDE 9 MG/ML
1000 INJECTION, SOLUTION INTRAVENOUS
Refills: 0 | Status: DISCONTINUED | OUTPATIENT
Start: 2023-04-04 | End: 2023-04-04

## 2023-04-04 RX ORDER — ONDANSETRON 8 MG/1
4 TABLET, FILM COATED ORAL ONCE
Refills: 0 | Status: DISCONTINUED | OUTPATIENT
Start: 2023-04-04 | End: 2023-04-04

## 2023-04-04 RX ORDER — OXYCODONE HYDROCHLORIDE 5 MG/1
5 TABLET ORAL EVERY 4 HOURS
Refills: 0 | Status: DISCONTINUED | OUTPATIENT
Start: 2023-04-04 | End: 2023-04-04

## 2023-04-04 RX ORDER — MORPHINE SULFATE 50 MG/1
4 CAPSULE, EXTENDED RELEASE ORAL ONCE
Refills: 0 | Status: DISCONTINUED | OUTPATIENT
Start: 2023-04-04 | End: 2023-04-04

## 2023-04-04 NOTE — ASU DISCHARGE PLAN (ADULT/PEDIATRIC) - ASU DC SPECIAL INSTRUCTIONSFT
take tylenol or motrin as needed for pain  advance diet to soft as tolerated   notify Dr. Snow or go to nearest emergency room for worsening neck pain/swelling or chest/back pain take tylenol or motrin as needed for pain  advance diet to soft as tolerated. Resume regular diet in two days.  notify Dr. Snow or go to nearest emergency room for worsening neck pain/swelling or chest/back pain

## 2023-04-04 NOTE — ASU DISCHARGE PLAN (ADULT/PEDIATRIC) - NS MD DC FALL RISK RISK
For information on Fall & Injury Prevention, visit: [email or web address] OR  [email or web address] OR  https://www.cdc.gov/steadi/patient.html

## 2023-04-04 NOTE — PRE-ANESTHESIA EVALUATION ADULT - NSANTHBMIRD_ENT_A_CORE
Pt presented w/ visual changes,  Visual disturbance in pt with acute/subacute b/l occipital infarcts diagnosed earlier this month- neuro/stroke team following up. RD assessment for BMI of 18.1/PO check. Pt and fam member reports good sarah/PO intake PTA and currently (observed pt ate about 100% of lunch tray). Reports no recent weight changes and that pt has been relatively thin his entire life. Denies any chewing/swallowing issues.
No

## 2023-04-04 NOTE — ASU DISCHARGE PLAN (ADULT/PEDIATRIC) - CARE PROVIDER_API CALL
Millie Smith)  [facility] Surgery; Otolaryngology  [address] [place] [address], [address] [place] [address]  Phone: [phone]  Fax: [phone]  Established Patient  Follow Up Time: 1 week

## 2023-04-19 ENCOUNTER — APPOINTMENT (OUTPATIENT)
Dept: OTOLARYNGOLOGY | Facility: CLINIC | Age: 83
End: 2023-04-19
Payer: MEDICARE

## 2023-04-19 PROCEDURE — 92526 ORAL FUNCTION THERAPY: CPT | Mod: GN

## 2023-04-26 RX ORDER — TAMSULOSIN HYDROCHLORIDE 0.4 MG/1
0.4 CAPSULE ORAL
Qty: 30 | Refills: 6 | Status: ACTIVE | COMMUNITY
Start: 2022-05-18 | End: 1900-01-01

## 2023-05-02 ENCOUNTER — APPOINTMENT (OUTPATIENT)
Dept: HEART AND VASCULAR | Facility: CLINIC | Age: 83
End: 2023-05-02
Payer: MEDICARE

## 2023-05-02 ENCOUNTER — NON-APPOINTMENT (OUTPATIENT)
Age: 83
End: 2023-05-02

## 2023-05-02 VITALS
TEMPERATURE: 96.6 F | HEIGHT: 70 IN | SYSTOLIC BLOOD PRESSURE: 158 MMHG | DIASTOLIC BLOOD PRESSURE: 82 MMHG | HEART RATE: 81 BPM | OXYGEN SATURATION: 98 % | WEIGHT: 122 LBS | BODY MASS INDEX: 17.47 KG/M2

## 2023-05-02 VITALS — SYSTOLIC BLOOD PRESSURE: 150 MMHG | DIASTOLIC BLOOD PRESSURE: 90 MMHG

## 2023-05-02 DIAGNOSIS — N40.0 BENIGN PROSTATIC HYPERPLASIA WITHOUT LOWER URINARY TRACT SYMPMS: ICD-10-CM

## 2023-05-02 PROCEDURE — 93000 ELECTROCARDIOGRAM COMPLETE: CPT

## 2023-05-02 PROCEDURE — 99214 OFFICE O/P EST MOD 30 MIN: CPT | Mod: 25

## 2023-05-08 ENCOUNTER — NON-APPOINTMENT (OUTPATIENT)
Age: 83
End: 2023-05-08

## 2023-05-17 ENCOUNTER — APPOINTMENT (OUTPATIENT)
Dept: OTOLARYNGOLOGY | Facility: CLINIC | Age: 83
End: 2023-05-17
Payer: MEDICARE

## 2023-05-17 PROCEDURE — 92526 ORAL FUNCTION THERAPY: CPT | Mod: GN

## 2023-05-26 NOTE — PATIENT PROFILE ADULT. - AS SC BRADEN SENSORY
Department of Anesthesiology  Postprocedure Note    Patient: Mine Landeros  MRN: [id number]  YOB: 1954  Date of evaluation: 5/26/2023      Procedure Summary     Date: 05/26/23 Room / Location: [facility] ENDO 01 / [facility] ENDOSCOPY    Anesthesia Start: 2255 Anesthesia Stop: 1114    Procedure: COLONOSCOPY WITH BIOPSY Diagnosis:       Colon cancer screening      (Colon cancer screening [Z12.11])    Surgeons:  Faisal Barragan MD Responsible Provider: Marie Victor MD    Anesthesia Type: MAC ASA Status: 2          Anesthesia Type: MAC    Jaqui Phase I: Jaqui Score: 10    Jaqui Phase II:        Anesthesia Post Evaluation    Patient location during evaluation: PACU  Patient participation: complete - patient participated  Level of consciousness: sleepy but conscious and responsive to verbal stimuli  Airway patency: patent  Nausea & Vomiting: no vomiting and no nausea  Complications: no  Cardiovascular status: blood pressure returned to baseline and hemodynamically stable  Respiratory status: acceptable  Hydration status: stable (4) no impairment

## 2023-05-31 NOTE — ASSESSMENT
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 He is here for follow up of prostate cancer, which was diagnosed in 6/2021. high risk, GG5, [place] (4+5), [id number]. PSA = 12 at diagnosis. ADT started 10/2021, completed RT to the prostate 6/2022. \par \par plan:\par 1. prostate cancer\par --continue ADT, depot lupron every 3 months. planning to complete 2 years of ADT (none past 10/2023)\par --reviewed recent PSA result ordered by PCP. PSA = 0.03 in 11/2022\par --discussed long term risks of ADT. Risk of loss of bone density; PCP is monitoring vit D levels, and as of 11/2022 they are replete. Consider DEXA if not done recently. Increased risk of cardiovascular disease;  attention to cardiovascular risk factors encouraged through PCP office\par --next lupron injection due late 3/2023 when he returns to town from vacation\par \par 2. laryngeal cancer, lung cancer\par --continue to follow with Dr Arce\par --CT chest due 5/2023\par \par RTC 6/2023 with lupron injection\par PSA, CBC, CMP if not done recently\par in person/same day as lupron injection\par

## 2023-05-31 NOTE — REASON FOR VISIT
[Follow-Up Visit] : a follow-up [Home] : at home, [unfilled] , at the time of the visit. [Verbal consent obtained from patient] : the patient, [unfilled]

## 2023-05-31 NOTE — HISTORY OF PRESENT ILLNESS
[de-identified] : Mr Franklin is an 82 year old male with a history of multiple cancers here for follow up of prostate cancer\par previously treated by Dr Grant for prostate cancer\par established care w/ myself 11/2022 after his departure\par Dr Arce following for laryngeal/lung ca\par \par telephone visit - pt did not have access to video technology for video visit.\par \par Oncologic history:\par 1. laryngeal cancer - chemoradiation 2016\par --recurrence on palate - resected, followed by radiation 2018\par \par 2. NATALIIA squamous cell carcinoma - stage I\par --lobectomy 10/2020, no adjuvant therapy\par \par 3. prostate cancer\par --diagnosed 6/2021, PSA = 12\par --[place] (4+5) - GG5. high risk. [id number]\par --consulted with Dr Velasquez, RT recommended\par --ADT with lupron + bicalutamide started 10/2021 by Dr Grant\par --completed RT to the prostate (Dr Gardner) 6/1/22 [de-identified] : Currently out of town with wife.  no new complaints.  chronic fatigue.  denies pain.

## 2023-06-01 ENCOUNTER — APPOINTMENT (OUTPATIENT)
Dept: HEMATOLOGY ONCOLOGY | Facility: CLINIC | Age: 83
End: 2023-06-01

## 2023-06-05 NOTE — PRE-ANESTHESIA EVALUATION ADULT - NSANTHALCOHOLSD_GEN_ALL_CORE
"Subjective   Monica Scherer is a 89 y.o. female. Presents today for   Chief Complaint   Patient presents with    Hyperlipidemia    Hypertension       History of Present Illness  Patient 89 y/o with htn, hld, a. Fib and hx of stroke;  due check vitamin D and b12;   no cp/soa; no palpitations;  no abd pain;  no new focal neuro deficits    Review of Systems   Respiratory:  Negative for shortness of breath.    Cardiovascular:  Negative for chest pain and palpitations.   Gastrointestinal:  Negative for abdominal pain.     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 artery    Paroxysmal atrial fibrillation    General weakness    History of CVA (cerebrovascular accident)    Anticoagulated    Hematuria    Dizziness and giddiness    Rectal bleeding       Social History     Socioeconomic History    Marital status: Single   Tobacco Use    Smoking status: Never    Smokeless tobacco: Never    Tobacco comments:     caffeine use- \"rare\"   Vaping Use    Vaping Use: Never used   Substance and Sexual Activity    Alcohol use: No    Drug use: No    Sexual activity: Defer       Allergies   Allergen Reactions    Cephalexin Rash    Latex Itching       Current Outpatient Medications on File Prior to Visit   Medication Sig Dispense Refill    alendronate (FOSAMAX) 70 MG tablet TAKE 1 TABLET BY MOUTH ONCE WEEKLY ON AN EMPTY STOMACH BEFORE BREAKFAST. REMAIN UPRIGHT FOR 30 MINUTES AND TAKE WITH 8 OUNCES OF WATER 12 tablet 3    aspirin 81 MG chewable tablet CHEW AND SWALLOW 1 TABLET BY MOUTH " "EVERY DAY (Patient taking differently: Chew 1 tablet Every Other Day. QOD) 90 tablet 2    atorvastatin (LIPITOR) 80 MG tablet TAKE ONE TABLET BY MOUTH DAILY 90 tablet 3    Eliquis 5 MG tablet tablet TAKE ONE TABLET BY MOUTH EVERY 12 HOURS 180 tablet 0    felodipine (PLENDIL) 10 MG 24 hr tablet TAKE ONE TABLET BY MOUTH DAILY 90 tablet 3    ferrous gluconate (FERGON) 324 MG tablet TAKE ONE TABLET BY MOUTH DAILY 30 tablet 2    hydrocortisone 2.5 % ointment WIPE TWO TIMES A DAY WITH PEA SIZED AMOUNT 60 g 0    metoprolol tartrate (LOPRESSOR) 25 MG tablet TAKE ONE TABLET BY MOUTH EVERY 12 HOURS 180 tablet 3    omeprazole (priLOSEC) 20 MG capsule TAKE ONE CAPSULE BY MOUTH DAILY 90 capsule 3    ondansetron ODT (ZOFRAN-ODT) 4 MG disintegrating tablet Place 1 tablet on the tongue Every 8 (Eight) Hours As Needed for Nausea or Vomiting. 24 tablet 0    vitamin D3 125 MCG (5000 UT) capsule capsule TAKE 1 CAPSULE  BY MOUTH DAILY 90 capsule 3    [DISCONTINUED] Cyanocobalamin 1000 MCG/ML kit Inject 1,000 mcg as directed 1 (One) Time Per Week. X 8 weeks then call for monthly regimen (Patient not taking: Reported on 6/5/2023) 8 kit 0    [DISCONTINUED] loperamide (IMODIUM) 2 MG capsule Take 1 capsule by mouth 4 (Four) Times a Day As Needed for Diarrhea. (Patient not taking: Reported on 6/5/2023) 24 capsule 0    [DISCONTINUED] Syringe 25G X 1\" 3 ML misc IM weekly x 8 weeks then call for monthly regimen (Patient not taking: Reported on 6/5/2023) 8 each 0     No current facility-administered medications on file prior to visit.       Objective   Vitals:    06/05/23 0844   BP: 146/82   Pulse: 86   SpO2: 97%   Weight: 78 kg (172 lb)   Height: 162.6 cm (64\")     Body mass index is 29.52 kg/m².    Physical Exam  Vitals and nursing note reviewed.   Constitutional:       Appearance: She is well-developed.   HENT:      Head: Normocephalic and atraumatic.   Neck:      Thyroid: No thyromegaly.      Vascular: No JVD.   Cardiovascular:      Rate and " Rhythm: Normal rate and regular rhythm.      Heart sounds: Normal heart sounds. No murmur heard.    No friction rub. No gallop.   Pulmonary:      Effort: Pulmonary effort is normal. No respiratory distress.      Breath sounds: Normal breath sounds. No wheezing or rales.   Abdominal:      General: Bowel sounds are normal. There is no distension.      Palpations: Abdomen is soft.      Tenderness: There is no abdominal tenderness. There is no guarding or rebound.   Musculoskeletal:      Cervical back: Neck supple.   Skin:     General: Skin is warm and dry.   Neurological:      Mental Status: She is alert.   Psychiatric:         Behavior: Behavior normal.       Assessment & Plan   Diagnoses and all orders for this visit:    1. Paroxysmal atrial fibrillation (Primary)  -     Comprehensive Metabolic Panel    2. Essential hypertension  -     Comprehensive Metabolic Panel    3. Normocytic anemia  -     CBC (No Diff)  -     Vitamin B12    4. Vitamin D deficiency  -     Vitamin D,25-Hydroxy    5. Mixed hyperlipidemia  -     Comprehensive Metabolic Panel  -     Lipid Panel    6. Primary hypertension  -     Comprehensive Metabolic Panel    7. History of CVA (cerebrovascular accident)    8. Prediabetes  -     Hemoglobin A1c    9. B12 deficiency  -     CBC (No Diff)    10. Need for COVID-19 vaccine  -     Cancel: COVID-19 (Pfizer) Bivalent 12+yrs    11. Overweight (BMI 25.0-29.9)    -hypertension - controlled, continue medications  -a. Fib stasble  -HLD - continue statin, recheck lipids.  -due check blood counts as hx of anemia nad low b12  Predm due dm2 screen             BMI is >= 25 and <30. (Overweight) The following options were offered after discussion;: weight loss educational material (shared in after visit summary)     -Follow up: 6 months and prn    No

## 2023-06-13 ENCOUNTER — NON-APPOINTMENT (OUTPATIENT)
Age: 83
End: 2023-06-13

## 2023-06-14 ENCOUNTER — RESULT REVIEW (OUTPATIENT)
Age: 83
End: 2023-06-14

## 2023-06-14 ENCOUNTER — APPOINTMENT (OUTPATIENT)
Dept: CT IMAGING | Facility: CLINIC | Age: 83
End: 2023-06-14
Payer: MEDICARE

## 2023-06-14 PROCEDURE — 71260 CT THORAX DX C+: CPT | Mod: MH

## 2023-06-20 ENCOUNTER — APPOINTMENT (OUTPATIENT)
Dept: OTOLARYNGOLOGY | Facility: CLINIC | Age: 83
End: 2023-06-20

## 2023-06-28 ENCOUNTER — APPOINTMENT (OUTPATIENT)
Dept: INFUSION THERAPY | Facility: CLINIC | Age: 83
End: 2023-06-28

## 2023-06-28 ENCOUNTER — OUTPATIENT (OUTPATIENT)
Dept: OUTPATIENT SERVICES | Facility: HOSPITAL | Age: 83
LOS: 1 days | End: 2023-06-28
Payer: MEDICARE

## 2023-06-28 ENCOUNTER — APPOINTMENT (OUTPATIENT)
Dept: CT IMAGING | Facility: HOSPITAL | Age: 83
End: 2023-06-28

## 2023-06-28 ENCOUNTER — APPOINTMENT (OUTPATIENT)
Dept: HEMATOLOGY ONCOLOGY | Facility: CLINIC | Age: 83
End: 2023-06-28
Payer: MEDICARE

## 2023-06-28 VITALS
WEIGHT: 123 LBS | OXYGEN SATURATION: 99 % | SYSTOLIC BLOOD PRESSURE: 119 MMHG | RESPIRATION RATE: 18 BRPM | BODY MASS INDEX: 17.61 KG/M2 | TEMPERATURE: 96.6 F | HEIGHT: 70 IN | DIASTOLIC BLOOD PRESSURE: 68 MMHG | HEART RATE: 76 BPM

## 2023-06-28 VITALS
HEART RATE: 76 BPM | WEIGHT: 123.02 LBS | OXYGEN SATURATION: 99 % | SYSTOLIC BLOOD PRESSURE: 119 MMHG | DIASTOLIC BLOOD PRESSURE: 68 MMHG | TEMPERATURE: 97 F | RESPIRATION RATE: 16 BRPM | HEIGHT: 70 IN

## 2023-06-28 DIAGNOSIS — Z90.89 ACQUIRED ABSENCE OF OTHER ORGANS: Chronic | ICD-10-CM

## 2023-06-28 DIAGNOSIS — C61 MALIGNANT NEOPLASM OF PROSTATE: ICD-10-CM

## 2023-06-28 DIAGNOSIS — Z41.9 ENCOUNTER FOR PROCEDURE FOR PURPOSES OTHER THAN REMEDYING HEALTH STATE, UNSPECIFIED: Chronic | ICD-10-CM

## 2023-06-28 DIAGNOSIS — Z90.49 ACQUIRED ABSENCE OF OTHER SPECIFIED PARTS OF DIGESTIVE TRACT: Chronic | ICD-10-CM

## 2023-06-28 DIAGNOSIS — Z98.890 OTHER SPECIFIED POSTPROCEDURAL STATES: Chronic | ICD-10-CM

## 2023-06-28 DIAGNOSIS — C32.3 MALIGNANT NEOPLASM OF LARYNGEAL CARTILAGE: Chronic | ICD-10-CM

## 2023-06-28 LAB — POCT ISTAT CREATININE: 0.6 MG/DL — SIGNIFICANT CHANGE UP (ref 0.5–1.3)

## 2023-06-28 PROCEDURE — 99213 OFFICE O/P EST LOW 20 MIN: CPT

## 2023-06-28 PROCEDURE — 96402 CHEMO HORMON ANTINEOPL SQ/IM: CPT

## 2023-06-28 PROCEDURE — 74177 CT ABD & PELVIS W/CONTRAST: CPT | Mod: 26,MH

## 2023-06-28 PROCEDURE — 82565 ASSAY OF CREATININE: CPT

## 2023-06-28 PROCEDURE — 74177 CT ABD & PELVIS W/CONTRAST: CPT | Mod: MH

## 2023-06-28 RX ORDER — LEUPROLIDE ACETATE 22.5 MG
22.5 KIT INTRAMUSCULAR
Qty: 1 | Refills: 1 | Status: DISCONTINUED | COMMUNITY
Start: 2021-11-23 | End: 2023-06-28

## 2023-06-28 RX ORDER — POTASSIUM CHLORIDE 1500 MG/1
20 TABLET, EXTENDED RELEASE ORAL DAILY
Refills: 0 | Status: DISCONTINUED | COMMUNITY
Start: 2021-04-29 | End: 2023-06-28

## 2023-06-28 RX ORDER — TERAZOSIN HCL 10 MG
CAPSULE ORAL
Refills: 0 | Status: DISCONTINUED | COMMUNITY
End: 2023-06-28

## 2023-06-28 RX ORDER — COLD-HOT PACK
50 EACH MISCELLANEOUS
Refills: 0 | Status: DISCONTINUED | COMMUNITY
Start: 2022-11-10 | End: 2023-06-28

## 2023-06-28 RX ORDER — LEVOTHYROXINE SODIUM 0.03 MG/1
25 TABLET ORAL
Refills: 0 | Status: DISCONTINUED | COMMUNITY
Start: 2022-11-10 | End: 2023-06-28

## 2023-06-28 RX ADMIN — Medication 22.5 MILLIGRAM(S): at 14:45

## 2023-06-28 NOTE — PHYSICAL EXAM
[Restricted in physically strenuous activity but ambulatory and able to carry out work of a light or sedentary nature] : Status 1- Restricted in physically strenuous activity but ambulatory and able to carry out work of a light or sedentary nature, e.g., light house work, office work [Normal] : grossly intact [de-identified] : ecchymosis on hands and arms, no lesions  [de-identified] : AAO x 3 speech normal

## 2023-06-28 NOTE — ASSESSMENT
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 He is here for follow up of prostate cancer, which was diagnosed in 6/2021. high risk, GG5, [place] (4+5), [id number]. PSA = 12 at diagnosis. ADT started 10/2021, completed RT to the prostate 6/2022. \par \par plan:\par 1.prostate cancer\par --will give last ADT, depot Lupron today, to complete 2 years of ADT (started 10/2021)\par --reviewed recent PSA result ordered by PCP. PSA = 0.06 on 6/6/23, 0.03 in 11/2022, stable \par --discussed long term risks of ADT. Risk of loss of bone density; PCP is monitoring vit D levels, WNL 55.2 on 6/6/23 Consider DEXA if not done recently. Increased risk of cardiovascular disease;  attention to cardiovascular risk factors encouraged through PCP office. \par --monitor PSA level every 6-8 months \par \par 2.laryngeal cancer, lung cancer\par --continue to follow with Dr Arce\par --repeated CT chest on 6/142023\par \par Plan discussed with Dr. Wren \par RTC in 6 months \par labs with PSA, CBC, CMP if not done prior to next visit

## 2023-06-28 NOTE — HISTORY OF PRESENT ILLNESS
[de-identified] : Mr Franklin is an 82 year old male with a history of multiple cancers here for follow up of prostate cancer and Lupron \par previously treated by Dr Grant for prostate cancer\par established care w/ myself 11/2022 after his departure\par Dr Arce following for laryngeal/lung ca\par \par Oncologic history:\par 1. laryngeal cancer - chemoradiation 2016\par --recurrence on palate - resected, followed by radiation 2018\par \par 2. NATALIIA squamous cell carcinoma - stage I\par --lobectomy 10/2020, no adjuvant therapy\par \par 3. prostate cancer\par --diagnosed 6/2021, PSA = 12\par --[place] (4+5) - GG5. high risk. [id number]\par --consulted with Dr Velasquez, RT recommended\par --ADT with lupron + bicalutamide started 10/2021 by Dr Grant\par --completed RT to the prostate (Dr Gardner) 6/1/22 [de-identified] : He presents to clinic for follow up with the last Lupron shot.  Overall he feels fine with chronic fatigue. denies pain or new symptoms.

## 2023-07-06 ENCOUNTER — NON-APPOINTMENT (OUTPATIENT)
Age: 83
End: 2023-07-06

## 2023-07-17 NOTE — DISCHARGE NOTE PROVIDER - NSDCCPGOAL_GEN_ALL_CORE_FT
PDMP reviewed and appropriate  Last UDS WNL  Dc'd Percocet as it is out of stock  Start Oxycodone 10 mg 1 PO q 6 hrs prn pain, # 120
Patient calling with questions/issues in regards to the following medication/s:     Provider: CHRIS Dominguez  Medication: oxyCODONE-acetaminophen (Percocet)  MG per tablet    Patient having the following symptoms/issues: Patient is contacting the office because the pharmacy doesn't have the above medication in stock    Patient states pharmacy was to fax over a request for prescription for Oxycodone 10mg instead since patients fill date was 07/16/2023    Please call patient at the following number:  [facility] [phone]     Patient states that it is okay to leave a detailed message. Patient was informed that the patient would receive a phone call back within 1 business day, unless this request is STAT.
To get better and follow your care plan as instructed.

## 2023-07-18 ENCOUNTER — APPOINTMENT (OUTPATIENT)
Dept: HEMATOLOGY ONCOLOGY | Facility: CLINIC | Age: 83
End: 2023-07-18

## 2023-08-11 ENCOUNTER — APPOINTMENT (OUTPATIENT)
Dept: INFUSION THERAPY | Facility: CLINIC | Age: 83
End: 2023-08-11

## 2023-08-15 ENCOUNTER — NON-APPOINTMENT (OUTPATIENT)
Age: 83
End: 2023-08-15

## 2023-09-06 ENCOUNTER — APPOINTMENT (OUTPATIENT)
Dept: OTOLARYNGOLOGY | Facility: CLINIC | Age: 83
End: 2023-09-06
Payer: MEDICARE

## 2023-09-06 PROCEDURE — 31575 DIAGNOSTIC LARYNGOSCOPY: CPT

## 2023-09-06 PROCEDURE — 99214 OFFICE O/P EST MOD 30 MIN: CPT | Mod: 25

## 2023-09-26 ENCOUNTER — OUTPATIENT (OUTPATIENT)
Dept: OUTPATIENT SERVICES | Facility: HOSPITAL | Age: 83
LOS: 1 days | End: 2023-09-26
Payer: MEDICARE

## 2023-09-26 ENCOUNTER — APPOINTMENT (OUTPATIENT)
Dept: CT IMAGING | Facility: HOSPITAL | Age: 83
End: 2023-09-26

## 2023-09-26 DIAGNOSIS — Z98.890 OTHER SPECIFIED POSTPROCEDURAL STATES: Chronic | ICD-10-CM

## 2023-09-26 DIAGNOSIS — C32.3 MALIGNANT NEOPLASM OF LARYNGEAL CARTILAGE: Chronic | ICD-10-CM

## 2023-09-26 DIAGNOSIS — Z41.9 ENCOUNTER FOR PROCEDURE FOR PURPOSES OTHER THAN REMEDYING HEALTH STATE, UNSPECIFIED: Chronic | ICD-10-CM

## 2023-09-26 DIAGNOSIS — Z90.89 ACQUIRED ABSENCE OF OTHER ORGANS: Chronic | ICD-10-CM

## 2023-09-26 DIAGNOSIS — Z90.49 ACQUIRED ABSENCE OF OTHER SPECIFIED PARTS OF DIGESTIVE TRACT: Chronic | ICD-10-CM

## 2023-09-26 LAB — POCT ISTAT CREATININE: 0.6 MG/DL — SIGNIFICANT CHANGE UP (ref 0.5–1.3)

## 2023-09-26 PROCEDURE — 70491 CT SOFT TISSUE NECK W/DYE: CPT

## 2023-09-26 PROCEDURE — 82565 ASSAY OF CREATININE: CPT

## 2023-09-26 PROCEDURE — 70491 CT SOFT TISSUE NECK W/DYE: CPT | Mod: 26,MH

## 2023-10-17 ENCOUNTER — APPOINTMENT (OUTPATIENT)
Dept: HEART AND VASCULAR | Facility: CLINIC | Age: 83
End: 2023-10-17
Payer: MEDICARE

## 2023-10-17 VITALS
OXYGEN SATURATION: 97 % | WEIGHT: 123 LBS | BODY MASS INDEX: 17.61 KG/M2 | SYSTOLIC BLOOD PRESSURE: 167 MMHG | HEART RATE: 63 BPM | DIASTOLIC BLOOD PRESSURE: 89 MMHG | HEIGHT: 70 IN

## 2023-10-17 VITALS — SYSTOLIC BLOOD PRESSURE: 166 MMHG | DIASTOLIC BLOOD PRESSURE: 79 MMHG

## 2023-10-17 DIAGNOSIS — I31.39 OTHER PERICARDIAL EFFUSION (NONINFLAMMATORY): ICD-10-CM

## 2023-10-17 DIAGNOSIS — I65.23 OCCLUSION AND STENOSIS OF BILATERAL CAROTID ARTERIES: ICD-10-CM

## 2023-10-17 DIAGNOSIS — Z86.79 PERSONAL HISTORY OF OTHER DISEASES OF THE CIRCULATORY SYSTEM: ICD-10-CM

## 2023-10-17 PROCEDURE — 93306 TTE W/DOPPLER COMPLETE: CPT

## 2023-10-17 PROCEDURE — 93880 EXTRACRANIAL BILAT STUDY: CPT

## 2023-10-17 PROCEDURE — 93000 ELECTROCARDIOGRAM COMPLETE: CPT

## 2023-10-17 PROCEDURE — 99214 OFFICE O/P EST MOD 30 MIN: CPT | Mod: 25

## 2023-10-18 ENCOUNTER — APPOINTMENT (OUTPATIENT)
Dept: OTOLARYNGOLOGY | Facility: CLINIC | Age: 83
End: 2023-10-18
Payer: MEDICARE

## 2023-10-18 PROCEDURE — 92526 ORAL FUNCTION THERAPY: CPT | Mod: GN

## 2023-10-30 ENCOUNTER — APPOINTMENT (OUTPATIENT)
Dept: NEPHROLOGY | Facility: CLINIC | Age: 83
End: 2023-10-30

## 2023-11-01 ENCOUNTER — APPOINTMENT (OUTPATIENT)
Dept: OTOLARYNGOLOGY | Facility: CLINIC | Age: 83
End: 2023-11-01
Payer: MEDICARE

## 2023-11-01 PROCEDURE — 31575 DIAGNOSTIC LARYNGOSCOPY: CPT

## 2023-11-01 PROCEDURE — 99214 OFFICE O/P EST MOD 30 MIN: CPT | Mod: 25

## 2023-12-05 ENCOUNTER — APPOINTMENT (OUTPATIENT)
Dept: HEMATOLOGY ONCOLOGY | Facility: CLINIC | Age: 83
End: 2023-12-05
Payer: MEDICARE

## 2023-12-05 VITALS
BODY MASS INDEX: 17.32 KG/M2 | SYSTOLIC BLOOD PRESSURE: 125 MMHG | HEIGHT: 70 IN | DIASTOLIC BLOOD PRESSURE: 72 MMHG | WEIGHT: 121 LBS | RESPIRATION RATE: 16 BRPM | OXYGEN SATURATION: 98 % | HEART RATE: 70 BPM | TEMPERATURE: 97.2 F

## 2023-12-05 PROCEDURE — 99213 OFFICE O/P EST LOW 20 MIN: CPT

## 2023-12-05 RX ORDER — LEVOTHYROXINE, LIOTHYRONINE 38; 9 UG/1; UG/1
60 TABLET ORAL
Refills: 0 | Status: ACTIVE | COMMUNITY

## 2023-12-05 RX ORDER — MULTIVIT-MIN/IRON/FOLIC ACID/K 18-600-40
CAPSULE ORAL
Refills: 0 | Status: ACTIVE | COMMUNITY

## 2023-12-05 RX ORDER — LEVOTHYROXINE, LIOTHYRONINE 19; 4.5 UG/1; UG/1
30 TABLET ORAL DAILY
Qty: 30 | Refills: 0 | Status: DISCONTINUED | COMMUNITY
Start: 2021-07-15 | End: 2023-12-05

## 2023-12-05 RX ORDER — ZINC SULFATE 50(220)MG
CAPSULE ORAL
Refills: 0 | Status: ACTIVE | COMMUNITY

## 2023-12-07 ENCOUNTER — APPOINTMENT (OUTPATIENT)
Dept: HEART AND VASCULAR | Facility: CLINIC | Age: 83
End: 2023-12-07

## 2023-12-15 ENCOUNTER — APPOINTMENT (OUTPATIENT)
Dept: HEART AND VASCULAR | Facility: CLINIC | Age: 83
End: 2023-12-15
Payer: MEDICARE

## 2023-12-15 DIAGNOSIS — R53.83 OTHER FATIGUE: ICD-10-CM

## 2023-12-15 DIAGNOSIS — I48.0 PAROXYSMAL ATRIAL FIBRILLATION: ICD-10-CM

## 2023-12-15 DIAGNOSIS — R06.02 SHORTNESS OF BREATH: ICD-10-CM

## 2023-12-15 PROCEDURE — 99443: CPT | Mod: 95

## 2024-01-29 ENCOUNTER — RX RENEWAL (OUTPATIENT)
Age: 84
End: 2024-01-29

## 2024-01-29 RX ORDER — METOPROLOL SUCCINATE 25 MG/1
25 TABLET, EXTENDED RELEASE ORAL
Qty: 90 | Refills: 3 | Status: ACTIVE | COMMUNITY
Start: 2021-11-09 | End: 1900-01-01

## 2024-02-05 PROBLEM — R53.83 FATIGUE: Status: ACTIVE | Noted: 2021-03-25

## 2024-02-05 PROBLEM — R06.02 SHORTNESS OF BREATH: Status: ACTIVE | Noted: 2021-06-08

## 2024-02-05 PROBLEM — I48.0 PAROXYSMAL ATRIAL FIBRILLATION: Status: ACTIVE | Noted: 2020-11-19

## 2024-04-02 ENCOUNTER — APPOINTMENT (OUTPATIENT)
Dept: HEART AND VASCULAR | Facility: CLINIC | Age: 84
End: 2024-04-02

## 2024-04-16 ENCOUNTER — APPOINTMENT (OUTPATIENT)
Dept: HEART AND VASCULAR | Facility: CLINIC | Age: 84
End: 2024-04-16
Payer: MEDICARE

## 2024-04-16 VITALS
HEIGHT: 70 IN | DIASTOLIC BLOOD PRESSURE: 78 MMHG | BODY MASS INDEX: 17.18 KG/M2 | SYSTOLIC BLOOD PRESSURE: 159 MMHG | WEIGHT: 120 LBS | OXYGEN SATURATION: 97 %

## 2024-04-16 DIAGNOSIS — C44.219 BASAL CELL CARCINOMA OF SKIN OF LEFT EAR AND EXTERNAL AURICULAR CANAL: ICD-10-CM

## 2024-04-16 DIAGNOSIS — I10 ESSENTIAL (PRIMARY) HYPERTENSION: ICD-10-CM

## 2024-04-16 DIAGNOSIS — I35.8 OTHER NONRHEUMATIC AORTIC VALVE DISORDERS: ICD-10-CM

## 2024-04-16 DIAGNOSIS — C10.9 MALIGNANT NEOPLASM OF OROPHARYNX, UNSPECIFIED: ICD-10-CM

## 2024-04-16 DIAGNOSIS — R97.20 ELEVATED PROSTATE, SPECIFIC ANTIGEN [PSA]: ICD-10-CM

## 2024-04-16 DIAGNOSIS — D64.9 ANEMIA, UNSPECIFIED: ICD-10-CM

## 2024-04-16 PROCEDURE — 99214 OFFICE O/P EST MOD 30 MIN: CPT | Mod: 25

## 2024-04-16 PROCEDURE — 93000 ELECTROCARDIOGRAM COMPLETE: CPT

## 2024-05-20 NOTE — REVIEW OF SYSTEMS
[Fever] : no fever [Chills] : no chills [Night Sweats] : no night sweats [Fatigue] : fatigue [Recent Change In Weight] : ~T no recent weight change [Dysphagia] : dysphagia [Loss of Hearing] : no loss of hearing [Nosebleeds] : no nosebleeds [Hoarseness] : no hoarseness [Odynophagia] : no odynophagia [Mucosal Pain] : no mucosal pain [Diarrhea: Grade 0] : Diarrhea: Grade 0 [Dysuria] : no dysuria [Incontinence] : no incontinence [Skin Rash] : no skin rash [Skin Wound] : no skin wound [Proptosis] : no proptosis [Hot Flashes] : hot flashes [Muscle Weakness] : no muscle weakness [Deepening Of The Voice] : no deepening of the voice [Easy Bleeding] : no tendency for easy bleeding [Easy Bruising] : a tendency for easy bruising [Swollen Glands] : no swollen glands [Negative] : Allergic/Immunologic [FreeTextEntry4] : occasional sore throat  [FreeTextEntry8] : nocturia x3  [de-identified] : s/p Mohs surgery (right upper chest) 3 weeks ago

## 2024-05-20 NOTE — ASSESSMENT
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 He is here for follow up of prostate cancer, which was diagnosed in 6/2021. high risk, GG5, [place] (4+5), [id number]. PSA = 12 at diagnosis. ADT started 10/2021 and completed on 6/28/23, RT to the prostate completed in 6/2022.   plan: 1.prostate cancer --completed 2 years of ADT on 6/28/23 (started 10/2021) --reviewed recent PSA result ordered by PCP. PSA 0.21 on 9/30/23, 0.06 on 6/6/23, 0.03 in 11/2022. trending up but stable. He had PSA checked locally last week and will call us once he has the result.  --he is clinically doing well w/o new symptoms.  --monitor PSA level every 6 months, --recent  vit D WNL, on daily vit D as per PCP.   --he received flu vaccine and covid booster.   2.laryngeal cancer, lung cancer --recent visit with Dr. Snow on 11/1/23, unremarkable for laryngeal cancer  --repeated CT chest on 6/142023 but no follow up with Dr Arce. As he is leaving for [place] on 12/7 and coming back in April, recommend f/u with Dr Arce in May.   Plan discussed with Dr. Wren RTC in May 2024 labs with PSA, CBC, CMP, testosterone if not done prior to next visit.

## 2024-05-20 NOTE — PHYSICAL EXAM
[Restricted in physically strenuous activity but ambulatory and able to carry out work of a light or sedentary nature] : Status 1- Restricted in physically strenuous activity but ambulatory and able to carry out work of a light or sedentary nature, e.g., light house work, office work [Ulcers] : no ulcers [Mucositis] : no mucositis [Thrush] : no thrush [Vesicles] : no vesicles [Normal] : affect appropriate [de-identified] : ecchymosis on hands, no lesions  [de-identified] : AAO x 3 speech normal

## 2024-05-21 ENCOUNTER — NON-APPOINTMENT (OUTPATIENT)
Age: 84
End: 2024-05-21

## 2024-05-21 ENCOUNTER — APPOINTMENT (OUTPATIENT)
Dept: HEMATOLOGY ONCOLOGY | Facility: CLINIC | Age: 84
End: 2024-05-21
Payer: MEDICARE

## 2024-05-21 ENCOUNTER — APPOINTMENT (OUTPATIENT)
Dept: HEMATOLOGY ONCOLOGY | Facility: CLINIC | Age: 84
End: 2024-05-21

## 2024-05-21 VITALS
OXYGEN SATURATION: 98 % | RESPIRATION RATE: 18 BRPM | WEIGHT: 126 LBS | DIASTOLIC BLOOD PRESSURE: 71 MMHG | HEART RATE: 79 BPM | TEMPERATURE: 98 F | BODY MASS INDEX: 18.04 KG/M2 | HEIGHT: 70 IN | SYSTOLIC BLOOD PRESSURE: 126 MMHG

## 2024-05-21 VITALS
TEMPERATURE: 98.4 F | DIASTOLIC BLOOD PRESSURE: 65 MMHG | HEIGHT: 70 IN | OXYGEN SATURATION: 97 % | RESPIRATION RATE: 16 BRPM | HEART RATE: 66 BPM | BODY MASS INDEX: 17.47 KG/M2 | WEIGHT: 122 LBS | SYSTOLIC BLOOD PRESSURE: 123 MMHG

## 2024-05-21 DIAGNOSIS — C61 MALIGNANT NEOPLASM OF PROSTATE: ICD-10-CM

## 2024-05-21 DIAGNOSIS — Z01.810 ENCOUNTER FOR PREPROCEDURAL CARDIOVASCULAR EXAMINATION: ICD-10-CM

## 2024-05-21 DIAGNOSIS — Z09 ENCOUNTER FOR FOLLOW-UP EXAMINATION AFTER COMPLETED TREATMENT FOR CONDITIONS OTHER THAN MALIGNANT NEOPLASM: ICD-10-CM

## 2024-05-21 PROCEDURE — 99213 OFFICE O/P EST LOW 20 MIN: CPT

## 2024-05-31 PROBLEM — C61 PROSTATE CA: Status: ACTIVE | Noted: 2021-10-07

## 2024-05-31 PROBLEM — Z09 POSTOP CHECK: Status: RESOLVED | Noted: 2020-11-02 | Resolved: 2024-05-31

## 2024-05-31 PROBLEM — Z01.810 PREOP CARDIOVASCULAR EXAM: Status: RESOLVED | Noted: 2020-10-09 | Resolved: 2024-05-31

## 2024-05-31 NOTE — HISTORY OF PRESENT ILLNESS
[de-identified] : Mr Franklin is an 83 year old male with a history of multiple cancers here for follow up of prostate cancer.  previously treated by Dr Grant for prostate cancer [facility] care w/ Dr. Wren 11/2022 after his departure Dr Arce following for laryngeal/lung ca  Oncologic history: 1. laryngeal cancer - chemoradiation 2016 --recurrence on palate - resected, followed by radiation 2018  2. NATALIIA squamous cell carcinoma - stage I --lobectomy 10/2020, no adjuvant therapy  3. prostate cancer --diagnosed 6/2021, PSA = 12 --[place] (4+5) - GG5. high risk. [id number] --consulted with Dr Velasquez, RT recommended --ADT with lupron + bicalutamide started 10/2021 by Dr Grant --completed RT to the prostate (Dr Gardner) 6/1/22, completed 2 years of ADT. [de-identified] : He presents to clinic for follow up. he reports ongoing fatigue. He underwent excision of skin lesion on his nose 3 weeks ago and did skin graft a week ago. denies SOB, dizziness, any pain or urinary symptoms except for nocturia.  otherwise he feels ok.

## 2024-05-31 NOTE — PHYSICAL EXAM
[Restricted in physically strenuous activity but ambulatory and able to carry out work of a light or sedentary nature] : Status 1- Restricted in physically strenuous activity but ambulatory and able to carry out work of a light or sedentary nature, e.g., light house work, office work [Ulcers] : no ulcers [Mucositis] : no mucositis [Thrush] : no thrush [Vesicles] : no vesicles [Normal] : affect appropriate [Ambulatory and capable of all self care but unable to carry out any work activities] : Status 2- Ambulatory and capable of all self care but unable to carry out any work activities. Up and about more than 50% of waking hours [de-identified] : soft, not distended [de-identified] : healing skin graft site/biopsy on his face.  dressings intact [de-identified] : AAO x 3 speech normal

## 2024-05-31 NOTE — REVIEW OF SYSTEMS
[Fever] : no fever [Chills] : no chills [Night Sweats] : no night sweats [Recent Change In Weight] : ~T no recent weight change [Loss of Hearing] : no loss of hearing [Nosebleeds] : no nosebleeds [Hoarseness] : no hoarseness [Odynophagia] : no odynophagia [Mucosal Pain] : no mucosal pain [Dysuria] : no dysuria [Incontinence] : no incontinence [Skin Rash] : no skin rash [Proptosis] : no proptosis [Muscle Weakness] : no muscle weakness [Deepening Of The Voice] : no deepening of the voice [Easy Bleeding] : no tendency for easy bleeding [Swollen Glands] : no swollen glands [FreeTextEntry4] : occasional sore throat  [FreeTextEntry8] : nocturia x3  [Skin Wound] : no skin wound [de-identified] : s/p excision of skin lesion on his nose 3 weeks ago and skin graft a week ago.

## 2024-05-31 NOTE — HISTORY OF PRESENT ILLNESS
[de-identified] : Mr Franklin is an 83 year old male with a history of multiple cancers here for follow up of prostate cancer.  previously treated by Dr Grant for prostate cancer [facility] care w/ Dr. Wren 11/2022 after his departure Dr Arce following for laryngeal/lung ca  Oncologic history: 1. laryngeal cancer - chemoradiation 2016 --recurrence on palate - resected, followed by radiation 2018  2. NATALIIA squamous cell carcinoma - stage I --lobectomy 10/2020, no adjuvant therapy  3. prostate cancer --diagnosed 6/2021, PSA = 12 --[place] (4+5) - GG5. high risk. [id number] --consulted with Dr Velasquez, RT recommended --ADT with lupron + bicalutamide started 10/2021 by Dr Grant --completed RT to the prostate (Dr Gardner) 6/1/22, completed 2 years of ADT. [de-identified] : He presents to clinic for follow up. he reports ongoing fatigue. He underwent excision of skin lesion on his nose 3 weeks ago and did skin graft a week ago. denies SOB, dizziness, any pain or urinary symptoms except for nocturia.  otherwise he feels ok.

## 2024-05-31 NOTE — PHYSICAL EXAM
[Restricted in physically strenuous activity but ambulatory and able to carry out work of a light or sedentary nature] : Status 1- Restricted in physically strenuous activity but ambulatory and able to carry out work of a light or sedentary nature, e.g., light house work, office work [Ulcers] : no ulcers [Mucositis] : no mucositis [Thrush] : no thrush [Vesicles] : no vesicles [Normal] : affect appropriate [Ambulatory and capable of all self care but unable to carry out any work activities] : Status 2- Ambulatory and capable of all self care but unable to carry out any work activities. Up and about more than 50% of waking hours [de-identified] : soft, not distended [de-identified] : healing skin graft site/biopsy on his face.  dressings intact [de-identified] : AAO x 3 speech normal

## 2024-05-31 NOTE — ASSESSMENT
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 He is here for follow up of prostate cancer, which was diagnosed in 6/2021. high risk, GG5, [place] (4+5), [id number]. PSA = 12 at diagnosis. ADT started 10/2021.  RT to the prostate completed in 6/2022.   Completed 2 years of ADT.  plan: 1.prostate cancer --completed 2 years of ADT  --his PSA has trended up since he stopped ADT.  Last PSA 4/2024 ordered by PCP was 0.8.  in 3/2024, was 0.5 with corresponding castrate testosterone level of 23.  In 9/2023, was 0.2. --scheduled PSMA on 6/10/24 as ordered by Dr. Briscoe, urologist.  --would not recommend ADT or secondary hormone therapies for biochemical recurrence.  If PSMA PET shows metastatic disease, then would resume ADT +/- enzalutamide or abiraterone.  2.laryngeal cancer, lung cancer --recent visit with Dr. Snow on 11/1/23. --due for chest CT scan for follow up which should be done annually.  await outcome of PSMA Pet scan first.  RTC on 6/25 after PSMA scans  (if not read by then - defer appt)

## 2024-05-31 NOTE — HISTORY OF PRESENT ILLNESS
[de-identified] : Mr Franklin is an 83 year old male with a history of multiple cancers here for follow up of prostate cancer.  previously treated by Dr Grant for prostate cancer [facility] care w/ Dr. Wren 11/2022 after his departure Dr Arce following for laryngeal/lung ca  Oncologic history: 1. laryngeal cancer - chemoradiation 2016 --recurrence on palate - resected, followed by radiation 2018  2. NATALIIA squamous cell carcinoma - stage I --lobectomy 10/2020, no adjuvant therapy  3. prostate cancer --diagnosed 6/2021, PSA = 12 --[place] (4+5) - GG5. high risk. [id number] --consulted with Dr Velasquez, RT recommended --ADT with lupron + bicalutamide started 10/2021 by Dr Grant --completed RT to the prostate (Dr Gardner) 6/1/22, completed 2 years of ADT. [de-identified] : He presents to clinic for follow up. he reports ongoing fatigue. He underwent excision of skin lesion on his nose 3 weeks ago and did skin graft a week ago. denies SOB, dizziness, any pain or urinary symptoms except for nocturia.  otherwise he feels ok.

## 2024-05-31 NOTE — ASSESSMENT
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 He is here for follow up of prostate cancer, which was diagnosed in 6/2021. high risk, GG5, [place] (4+5), [id number]. PSA = 12 at diagnosis. ADT started 10/2021.  RT to the prostate completed in 6/2022.   Completed 2 years of ADT.  plan: 1.prostate cancer --completed 2 years of ADT  --his PSA has trended up since he stopped ADT.  Last PSA 4/2024 ordered by PCP was 0.8.  in 3/2024, was 0.5 with corresponding castrate testosterone level of 23.  In 9/2023, was 0.2. --scheduled PSMA on 6/10/24 as ordered by Dr. Briscoe, urologist.  --would not recommend ADT or secondary hormone therapies for biochemical recurrence.  If PSMA PET shows metastatic disease, then would resume ADT +/- enzalutamide or abiraterone.  2.laryngeal cancer, lung cancer --recent visit with Dr. Snow on 11/1/23. --due for chest CT scan for follow up which should be done annually.  await outcome of PSMA Pet scan first.  RTC on 6/25 after PSMA scans  (if not read by then - defer appt)

## 2024-05-31 NOTE — REVIEW OF SYSTEMS
[Fever] : no fever [Chills] : no chills [Night Sweats] : no night sweats [Recent Change In Weight] : ~T no recent weight change [Loss of Hearing] : no loss of hearing [Nosebleeds] : no nosebleeds [Hoarseness] : no hoarseness [Odynophagia] : no odynophagia [Mucosal Pain] : no mucosal pain [Dysuria] : no dysuria [Incontinence] : no incontinence [Skin Rash] : no skin rash [Proptosis] : no proptosis [Muscle Weakness] : no muscle weakness [Deepening Of The Voice] : no deepening of the voice [Easy Bleeding] : no tendency for easy bleeding [Swollen Glands] : no swollen glands [FreeTextEntry4] : occasional sore throat  [FreeTextEntry8] : nocturia x3  [Skin Wound] : no skin wound [de-identified] : s/p excision of skin lesion on his nose 3 weeks ago and skin graft a week ago.

## 2024-05-31 NOTE — PHYSICAL EXAM
[Restricted in physically strenuous activity but ambulatory and able to carry out work of a light or sedentary nature] : Status 1- Restricted in physically strenuous activity but ambulatory and able to carry out work of a light or sedentary nature, e.g., light house work, office work [Ulcers] : no ulcers [Mucositis] : no mucositis [Thrush] : no thrush [Vesicles] : no vesicles [Normal] : affect appropriate [Ambulatory and capable of all self care but unable to carry out any work activities] : Status 2- Ambulatory and capable of all self care but unable to carry out any work activities. Up and about more than 50% of waking hours [de-identified] : soft, not distended [de-identified] : healing skin graft site/biopsy on his face.  dressings intact [de-identified] : AAO x 3 speech normal

## 2024-05-31 NOTE — ASSESSMENT
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 He is here for follow up of prostate cancer, which was diagnosed in 6/2021. high risk, GG5, [place] (4+5), [id number]. PSA = 12 at diagnosis. ADT started 10/2021.  RT to the prostate completed in 6/2022.   Completed 2 years of ADT.  plan: 1.prostate cancer --completed 2 years of ADT  --his PSA has trended up since he stopped ADT.  Last PSA 4/2024 ordered by PCP was 0.8.  in 3/2024, was 0.5 with corresponding castrate testosterone level of 23.  In 9/2023, was 0.2. --scheduled PSMA on 6/10/24 as ordered by Dr. Briscoe, urologist.  --would not recommend ADT or secondary hormone therapies for biochemical recurrence.  If PSMA PET shows metastatic disease, then would resume ADT +/- enzalutamide or abiraterone.  2.laryngeal cancer, lung cancer --recent visit with Dr. Snow on 11/1/23. --due for chest CT scan for follow up which should be done annually.  await outcome of PSMA Pet scan first.  RTC on 6/25 after PSMA scans  (if not read by then - defer appt)

## 2024-05-31 NOTE — REVIEW OF SYSTEMS
[Fever] : no fever [Chills] : no chills [Night Sweats] : no night sweats [Recent Change In Weight] : ~T no recent weight change [Loss of Hearing] : no loss of hearing [Nosebleeds] : no nosebleeds [Hoarseness] : no hoarseness [Odynophagia] : no odynophagia [Mucosal Pain] : no mucosal pain [Dysuria] : no dysuria [Incontinence] : no incontinence [Skin Rash] : no skin rash [Proptosis] : no proptosis [Muscle Weakness] : no muscle weakness [Deepening Of The Voice] : no deepening of the voice [Easy Bleeding] : no tendency for easy bleeding [Swollen Glands] : no swollen glands [FreeTextEntry4] : occasional sore throat  [FreeTextEntry8] : nocturia x3  [Skin Wound] : no skin wound [de-identified] : s/p excision of skin lesion on his nose 3 weeks ago and skin graft a week ago.

## 2024-06-10 ENCOUNTER — APPOINTMENT (OUTPATIENT)
Dept: CT IMAGING | Facility: HOSPITAL | Age: 84
End: 2024-06-10
Payer: MEDICARE

## 2024-06-10 ENCOUNTER — APPOINTMENT (OUTPATIENT)
Dept: NUCLEAR MEDICINE | Facility: HOSPITAL | Age: 84
End: 2024-06-10
Payer: MEDICARE

## 2024-06-10 ENCOUNTER — OUTPATIENT (OUTPATIENT)
Dept: OUTPATIENT SERVICES | Facility: HOSPITAL | Age: 84
LOS: 1 days | End: 2024-06-10
Payer: MEDICARE

## 2024-06-10 DIAGNOSIS — C61 MALIGNANT NEOPLASM OF PROSTATE: ICD-10-CM

## 2024-06-10 DIAGNOSIS — Z41.9 ENCOUNTER FOR PROCEDURE FOR PURPOSES OTHER THAN REMEDYING HEALTH STATE, UNSPECIFIED: Chronic | ICD-10-CM

## 2024-06-10 DIAGNOSIS — C32.3 MALIGNANT NEOPLASM OF LARYNGEAL CARTILAGE: Chronic | ICD-10-CM

## 2024-06-10 DIAGNOSIS — K13.79 OTHER LESIONS OF ORAL MUCOSA: ICD-10-CM

## 2024-06-10 DIAGNOSIS — R18.8 OTHER ASCITES: ICD-10-CM

## 2024-06-10 DIAGNOSIS — C44.329 SQUAMOUS CELL CARCINOMA OF SKIN OF OTHER PARTS OF FACE: ICD-10-CM

## 2024-06-10 DIAGNOSIS — Z98.890 OTHER SPECIFIED POSTPROCEDURAL STATES: Chronic | ICD-10-CM

## 2024-06-10 DIAGNOSIS — R93.41 ABNORMAL RADIOLOGIC FINDINGS ON DIAGNOSTIC IMAGING OF RENAL PELVIS, URETER, OR BLADDER: ICD-10-CM

## 2024-06-10 DIAGNOSIS — Z90.89 ACQUIRED ABSENCE OF OTHER ORGANS: Chronic | ICD-10-CM

## 2024-06-10 DIAGNOSIS — J90 PLEURAL EFFUSION, NOT ELSEWHERE CLASSIFIED: ICD-10-CM

## 2024-06-10 DIAGNOSIS — Z90.49 ACQUIRED ABSENCE OF OTHER SPECIFIED PARTS OF DIGESTIVE TRACT: Chronic | ICD-10-CM

## 2024-06-10 DIAGNOSIS — R91.8 OTHER NONSPECIFIC ABNORMAL FINDING OF LUNG FIELD: ICD-10-CM

## 2024-06-10 DIAGNOSIS — Z92.3 PERSONAL HISTORY OF IRRADIATION: ICD-10-CM

## 2024-06-10 DIAGNOSIS — M47.812 SPONDYLOSIS WITHOUT MYELOPATHY OR RADICULOPATHY, CERVICAL REGION: ICD-10-CM

## 2024-06-10 DIAGNOSIS — C31.0 MALIGNANT NEOPLASM OF MAXILLARY SINUS: ICD-10-CM

## 2024-06-10 DIAGNOSIS — M26.69 OTHER SPECIFIED DISORDERS OF TEMPOROMANDIBULAR JOINT: ICD-10-CM

## 2024-06-10 LAB — GLUCOSE BLDC GLUCOMTR-MCNC: 98 MG/DL — SIGNIFICANT CHANGE UP (ref 70–99)

## 2024-06-10 PROCEDURE — 70491 CT SOFT TISSUE NECK W/DYE: CPT | Mod: 26,MH

## 2024-06-10 PROCEDURE — 78816 PET IMAGE W/CT FULL BODY: CPT | Mod: 26,MH

## 2024-06-10 PROCEDURE — 70487 CT MAXILLOFACIAL W/DYE: CPT | Mod: MH

## 2024-06-10 PROCEDURE — 70487 CT MAXILLOFACIAL W/DYE: CPT | Mod: 26,MH

## 2024-06-10 PROCEDURE — A9595: CPT

## 2024-06-10 PROCEDURE — 70491 CT SOFT TISSUE NECK W/DYE: CPT | Mod: MH

## 2024-06-10 PROCEDURE — 78816 PET IMAGE W/CT FULL BODY: CPT | Mod: MH

## 2024-06-10 PROCEDURE — 82962 GLUCOSE BLOOD TEST: CPT

## 2024-06-11 ENCOUNTER — NON-APPOINTMENT (OUTPATIENT)
Age: 84
End: 2024-06-11

## 2024-06-11 ENCOUNTER — APPOINTMENT (OUTPATIENT)
Dept: OTOLARYNGOLOGY | Facility: CLINIC | Age: 84
End: 2024-06-11
Payer: MEDICARE

## 2024-06-11 ENCOUNTER — APPOINTMENT (OUTPATIENT)
Dept: OTOLARYNGOLOGY | Facility: CLINIC | Age: 84
End: 2024-06-11

## 2024-06-11 VITALS
SYSTOLIC BLOOD PRESSURE: 150 MMHG | DIASTOLIC BLOOD PRESSURE: 72 MMHG | HEIGHT: 70 IN | TEMPERATURE: 99.2 F | OXYGEN SATURATION: 100 % | HEART RATE: 64 BPM

## 2024-06-11 PROCEDURE — 99214 OFFICE O/P EST MOD 30 MIN: CPT | Mod: 25

## 2024-06-11 PROCEDURE — 31575 DIAGNOSTIC LARYNGOSCOPY: CPT

## 2024-06-11 PROCEDURE — 99213 OFFICE O/P EST LOW 20 MIN: CPT | Mod: 25

## 2024-06-12 ENCOUNTER — NON-APPOINTMENT (OUTPATIENT)
Age: 84
End: 2024-06-12

## 2024-06-14 ENCOUNTER — NON-APPOINTMENT (OUTPATIENT)
Age: 84
End: 2024-06-14

## 2024-06-17 ENCOUNTER — APPOINTMENT (OUTPATIENT)
Dept: HEART AND VASCULAR | Facility: CLINIC | Age: 84
End: 2024-06-17

## 2024-06-17 DIAGNOSIS — R53.82 CHRONIC FATIGUE, UNSPECIFIED: ICD-10-CM

## 2024-06-17 DIAGNOSIS — Z01.818 ENCOUNTER FOR OTHER PREPROCEDURAL EXAMINATION: ICD-10-CM

## 2024-06-17 DIAGNOSIS — R94.31 ABNORMAL ELECTROCARDIOGRAM [ECG] [EKG]: ICD-10-CM

## 2024-06-17 DIAGNOSIS — R60.0 LOCALIZED EDEMA: ICD-10-CM

## 2024-06-17 DIAGNOSIS — I65.29 OCCLUSION AND STENOSIS OF UNSPECIFIED CAROTID ARTERY: ICD-10-CM

## 2024-06-17 DIAGNOSIS — E03.9 HYPOTHYROIDISM, UNSPECIFIED: ICD-10-CM

## 2024-06-17 DIAGNOSIS — R00.2 PALPITATIONS: ICD-10-CM

## 2024-06-17 DIAGNOSIS — R68.89 OTHER GENERAL SYMPTOMS AND SIGNS: ICD-10-CM

## 2024-06-17 DIAGNOSIS — I47.10 SUPRAVENTRICULAR TACHYCARDIA, UNSPECIFIED: ICD-10-CM

## 2024-06-17 PROBLEM — R97.20 ELEVATED PSA: Status: ACTIVE | Noted: 2022-08-24

## 2024-06-17 PROBLEM — C44.219: Status: ACTIVE | Noted: 2020-07-30

## 2024-06-17 PROBLEM — I35.8 AORTIC VALVE SCLEROSIS: Status: ACTIVE | Noted: 2022-05-24

## 2024-06-17 PROBLEM — D64.9 ANEMIA: Status: ACTIVE | Noted: 2022-08-25

## 2024-06-17 NOTE — HISTORY OF PRESENT ILLNESS
[de-identified] : Patient is an 83 year old male with a significant history of oral cavity, hypopharyngeal SCC post multiple surgical procedures, radiation, aspiration, malnutrition with dysphagia. Recently had a right molar extraction with biopsy revealing recurrent right maxilla SCC [id number]. PET/CT ordered. Dr. Alvarez sent to me for evaluation for salvage surgery reconstruction of palate. The patient has no pain in the area currently. Overall stable from a medical standpoint.

## 2024-06-17 NOTE — REVIEW OF SYSTEMS
[As Noted in HPI] : as noted in HPI [Feeling Tired] : feeling tired [Negative] : Heme/Lymph [FreeTextEntry2] : Long term weight loss due to nutrition

## 2024-06-17 NOTE — ADDENDUM
[FreeTextEntry1] : Speech Language Pathology. Pt seen during clinic visit in conjunction with Dr. Yin. Pt well known to me due to extensive dysphagia history.  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 Last Modified Barium Swallow study in 2022 which revealed significantly reduced swallow efficiency and safety with silent aspiration of thin liquids. Pt has not been consistent with dysphagia follow up since [place] of [place]. Pt has refused to consider a feeding tube in the past. However, given current recurrence and need for surgical management, worsening dysphagia per Pt report, weight loss (current weight is 121 lb), and risk for malnutrition, a PEG tube is strongly recommended. Above was discussed with Pt in detail. Pt agreed to consider and would like to discuss it further with surgical team. Pt was advised that having a PEG tube does not preclude him from eating by mouth but it will optimize his nutritional status which is imperative for healing. All questions were answered.  Zayra Chan M.S. CCC-SLP

## 2024-06-17 NOTE — PHYSICAL EXAM
[Normal] : no rashes [FreeTextEntry1] : Thin, pleasant male, no distress [de-identified] : Radiation and surgery sequelae no masses noted [de-identified] : Right maxillary ulcerative lesion, somewhat diffuse margin, but no pharyngeal involvement [FreeTextEntry2] : Post surgery and radiation [de-identified] : No new masses

## 2024-06-17 NOTE — PROCEDURE
[de-identified] : Procedure performed: Fiberoptic Laryngeal Endoscopy - Diagnostic for aspiration, history of radiation After informed verbal consent topical neosynephrine and lidocaine were applied, the fiberoptic nasal endoscope was passed via the R/L nasal cavity without incident. The patient tolerated the procedure quite well. The nasal cavity was normal without evidence of masses, polyps, lesions or drainage. The nasal septum and turbinates are within normal limits. Nasopharynx was normal without lesions or masses. Eustachian tube orifice normal bilaterals. Oropharynx and Hypopharyngeal mucosa are within normal limits without lesions masses, ulcerations or concerning findings. True and false vocal folds are within normal limits, normal sensory and motor examination. The base of tongue, vallecula, epiglottis are within normal limits without evidence of lesions or abnormalities.  Positive Findings: Some radiation changes, no obvious palate involvement on nasal side, pharynx clear

## 2024-06-21 ENCOUNTER — NON-APPOINTMENT (OUTPATIENT)
Age: 84
End: 2024-06-21

## 2024-06-21 PROBLEM — Z01.818 PREOPERATIVE CLEARANCE: Status: ACTIVE | Noted: 2024-06-21

## 2024-06-21 PROBLEM — R94.31 ABNORMAL EKG: Status: ACTIVE | Noted: 2020-10-09

## 2024-06-21 PROBLEM — R53.82 CHRONIC FATIGUE: Status: ACTIVE | Noted: 2020-11-19

## 2024-06-21 PROBLEM — I65.29 CAROTID ATHEROSCLEROSIS: Status: ACTIVE | Noted: 2023-10-16

## 2024-06-21 PROBLEM — R60.0 BILATERAL LEG EDEMA: Status: ACTIVE | Noted: 2020-11-19

## 2024-06-21 PROBLEM — R00.2 PALPITATIONS: Status: ACTIVE | Noted: 2021-11-09

## 2024-06-21 PROBLEM — I47.10 SVT (SUPRAVENTRICULAR TACHYCARDIA): Status: ACTIVE | Noted: 2023-05-02

## 2024-06-21 PROBLEM — E03.9 HYPOTHYROIDISM: Status: ACTIVE | Noted: 2021-07-15

## 2024-06-21 PROBLEM — R68.89 EXERCISE INTOLERANCE: Status: ACTIVE | Noted: 2020-11-19

## 2024-06-25 ENCOUNTER — TRANSCRIPTION ENCOUNTER (OUTPATIENT)
Age: 84
End: 2024-06-25

## 2024-06-25 VITALS
DIASTOLIC BLOOD PRESSURE: 75 MMHG | HEIGHT: 71 IN | RESPIRATION RATE: 16 BRPM | SYSTOLIC BLOOD PRESSURE: 145 MMHG | HEART RATE: 81 BPM | WEIGHT: 124.78 LBS | TEMPERATURE: 97 F | OXYGEN SATURATION: 97 %

## 2024-06-25 RX ORDER — LEVOTHYROXINE SODIUM 125 MCG
50 TABLET ORAL
Qty: 0 | Refills: 0 | DISCHARGE

## 2024-06-25 RX ORDER — TERAZOSIN HYDROCHLORIDE 10 MG/1
1 CAPSULE ORAL
Qty: 0 | Refills: 0 | DISCHARGE

## 2024-06-25 RX ORDER — BICALUTAMIDE 50 MG/1
1 TABLET, FILM COATED ORAL
Qty: 0 | Refills: 0 | DISCHARGE

## 2024-06-26 ENCOUNTER — APPOINTMENT (OUTPATIENT)
Dept: OTOLARYNGOLOGY | Facility: HOSPITAL | Age: 84
End: 2024-06-26

## 2024-06-26 ENCOUNTER — INPATIENT (INPATIENT)
Facility: HOSPITAL | Age: 84
LOS: 6 days | Discharge: HOME | DRG: 464 | End: 2024-07-03
Attending: DENTIST | Admitting: DENTIST
Payer: MEDICARE

## 2024-06-26 DIAGNOSIS — Z92.21 PERSONAL HISTORY OF ANTINEOPLASTIC CHEMOTHERAPY: ICD-10-CM

## 2024-06-26 DIAGNOSIS — Z98.890 OTHER SPECIFIED POSTPROCEDURAL STATES: Chronic | ICD-10-CM

## 2024-06-26 DIAGNOSIS — Z79.890 HORMONE REPLACEMENT THERAPY: ICD-10-CM

## 2024-06-26 DIAGNOSIS — C41.0 MALIGNANT NEOPLASM OF BONES OF SKULL AND FACE: ICD-10-CM

## 2024-06-26 DIAGNOSIS — C32.3 MALIGNANT NEOPLASM OF LARYNGEAL CARTILAGE: Chronic | ICD-10-CM

## 2024-06-26 DIAGNOSIS — Z85.46 PERSONAL HISTORY OF MALIGNANT NEOPLASM OF PROSTATE: ICD-10-CM

## 2024-06-26 DIAGNOSIS — Z93.1 GASTROSTOMY STATUS: ICD-10-CM

## 2024-06-26 DIAGNOSIS — Z92.3 PERSONAL HISTORY OF IRRADIATION: ICD-10-CM

## 2024-06-26 DIAGNOSIS — Z41.9 ENCOUNTER FOR PROCEDURE FOR PURPOSES OTHER THAN REMEDYING HEALTH STATE, UNSPECIFIED: Chronic | ICD-10-CM

## 2024-06-26 DIAGNOSIS — Z90.49 ACQUIRED ABSENCE OF OTHER SPECIFIED PARTS OF DIGESTIVE TRACT: Chronic | ICD-10-CM

## 2024-06-26 DIAGNOSIS — E44.0 MODERATE PROTEIN-CALORIE MALNUTRITION: ICD-10-CM

## 2024-06-26 DIAGNOSIS — I47.19 OTHER SUPRAVENTRICULAR TACHYCARDIA: ICD-10-CM

## 2024-06-26 DIAGNOSIS — Z90.89 ACQUIRED ABSENCE OF OTHER ORGANS: Chronic | ICD-10-CM

## 2024-06-26 DIAGNOSIS — Z88.8 ALLERGY STATUS TO OTHER DRUGS, MEDICAMENTS AND BIOLOGICAL SUBSTANCES: ICD-10-CM

## 2024-06-26 DIAGNOSIS — Z90.2 ACQUIRED ABSENCE OF LUNG [PART OF]: ICD-10-CM

## 2024-06-26 DIAGNOSIS — Z79.899 OTHER LONG TERM (CURRENT) DRUG THERAPY: ICD-10-CM

## 2024-06-26 DIAGNOSIS — Z85.118 PERSONAL HISTORY OF OTHER MALIGNANT NEOPLASM OF BRONCHUS AND LUNG: ICD-10-CM

## 2024-06-26 DIAGNOSIS — R13.10 DYSPHAGIA, UNSPECIFIED: ICD-10-CM

## 2024-06-26 DIAGNOSIS — Z91.040 LATEX ALLERGY STATUS: ICD-10-CM

## 2024-06-26 DIAGNOSIS — I10 ESSENTIAL (PRIMARY) HYPERTENSION: ICD-10-CM

## 2024-06-26 DIAGNOSIS — Z90.79 ACQUIRED ABSENCE OF OTHER GENITAL ORGAN(S): ICD-10-CM

## 2024-06-26 LAB
ANION GAP SERPL CALC-SCNC: 9 MMOL/L — SIGNIFICANT CHANGE UP (ref 5–17)
APTT BLD: 34.1 SEC — SIGNIFICANT CHANGE UP (ref 24.5–35.6)
BASE EXCESS BLDA CALC-SCNC: 6.3 MMOL/L — HIGH (ref -2–3)
BUN SERPL-MCNC: 16 MG/DL — SIGNIFICANT CHANGE UP (ref 7–23)
CA-I BLDA-SCNC: 1.19 MMOL/L — SIGNIFICANT CHANGE UP (ref 1.15–1.33)
CALCIUM SERPL-MCNC: 8.9 MG/DL — SIGNIFICANT CHANGE UP (ref 8.4–10.5)
CHLORIDE SERPL-SCNC: 100 MMOL/L — SIGNIFICANT CHANGE UP (ref 96–108)
CO2 BLDA-SCNC: 32 MMOL/L — HIGH (ref 19–24)
CO2 SERPL-SCNC: 30 MMOL/L — SIGNIFICANT CHANGE UP (ref 22–31)
COHGB MFR BLDA: 2 % — SIGNIFICANT CHANGE UP
CREAT SERPL-MCNC: 0.6 MG/DL — SIGNIFICANT CHANGE UP (ref 0.5–1.3)
EGFR: 96 ML/MIN/1.73M2 — SIGNIFICANT CHANGE UP
GLUCOSE BLDA-MCNC: 115 MG/DL — HIGH (ref 70–99)
GLUCOSE BLDC GLUCOMTR-MCNC: 143 MG/DL — HIGH (ref 70–99)
GLUCOSE BLDC GLUCOMTR-MCNC: 165 MG/DL — HIGH (ref 70–99)
GLUCOSE SERPL-MCNC: 141 MG/DL — HIGH (ref 70–99)
HCO3 BLDA-SCNC: 31 MMOL/L — HIGH (ref 21–28)
HCT VFR BLD CALC: 32.1 % — LOW (ref 39–50)
HGB BLD-MCNC: 10.3 G/DL — LOW (ref 13–17)
HGB BLDA-MCNC: 10 G/DL — LOW (ref 12.6–17.4)
INR BLD: 1.09 — SIGNIFICANT CHANGE UP (ref 0.85–1.18)
MAGNESIUM SERPL-MCNC: 1.7 MG/DL — SIGNIFICANT CHANGE UP (ref 1.6–2.6)
MCHC RBC-ENTMCNC: 30.1 PG — SIGNIFICANT CHANGE UP (ref 27–34)
MCHC RBC-ENTMCNC: 32.1 GM/DL — SIGNIFICANT CHANGE UP (ref 32–36)
MCV RBC AUTO: 93.9 FL — SIGNIFICANT CHANGE UP (ref 80–100)
METHGB MFR BLDA: 0.8 % — SIGNIFICANT CHANGE UP
NRBC # BLD: 0 /100 WBCS — SIGNIFICANT CHANGE UP (ref 0–0)
OXYHGB MFR BLDA: 97.1 % — HIGH (ref 90–95)
PCO2 BLDA: 42 MMHG — SIGNIFICANT CHANGE UP (ref 35–48)
PH BLDA: 7.47 — HIGH (ref 7.35–7.45)
PHOSPHATE SERPL-MCNC: 3.4 MG/DL — SIGNIFICANT CHANGE UP (ref 2.5–4.5)
PLATELET # BLD AUTO: 233 K/UL — SIGNIFICANT CHANGE UP (ref 150–400)
PO2 BLDA: 198 MMHG — HIGH (ref 83–108)
POTASSIUM BLDA-SCNC: 3.7 MMOL/L — SIGNIFICANT CHANGE UP (ref 3.5–5.1)
POTASSIUM SERPL-MCNC: 4 MMOL/L — SIGNIFICANT CHANGE UP (ref 3.5–5.3)
POTASSIUM SERPL-SCNC: 4 MMOL/L — SIGNIFICANT CHANGE UP (ref 3.5–5.3)
PROTHROM AB SERPL-ACNC: 12.4 SEC — SIGNIFICANT CHANGE UP (ref 9.5–13)
RBC # BLD: 3.42 M/UL — LOW (ref 4.2–5.8)
RBC # FLD: 12.8 % — SIGNIFICANT CHANGE UP (ref 10.3–14.5)
SAO2 % BLDA: 99.9 % — HIGH (ref 94–98)
SODIUM BLDA-SCNC: 135 MMOL/L — LOW (ref 136–145)
SODIUM SERPL-SCNC: 139 MMOL/L — SIGNIFICANT CHANGE UP (ref 135–145)
WBC # BLD: 12.33 K/UL — HIGH (ref 3.8–10.5)
WBC # FLD AUTO: 12.33 K/UL — HIGH (ref 3.8–10.5)

## 2024-06-26 PROCEDURE — 99233 SBSQ HOSP IP/OBS HIGH 50: CPT

## 2024-06-26 PROCEDURE — 88304 TISSUE EXAM BY PATHOLOGIST: CPT | Mod: 26

## 2024-06-26 PROCEDURE — 15757 FREE SKIN FLAP MICROVASC: CPT

## 2024-06-26 PROCEDURE — 43246 EGD PLACE GASTROSTOMY TUBE: CPT

## 2024-06-26 PROCEDURE — 88331 PATH CONSLTJ SURG 1 BLK 1SPC: CPT | Mod: 26

## 2024-06-26 PROCEDURE — 88305 TISSUE EXAM BY PATHOLOGIST: CPT | Mod: 26

## 2024-06-26 PROCEDURE — 14041 TIS TRNFR F/C/C/M/N/A/G/H/F: CPT

## 2024-06-26 DEVICE — CARTRIDGE MICROCLIP 30: Type: IMPLANTABLE DEVICE | Status: FUNCTIONAL

## 2024-06-26 DEVICE — LIGATING CLIPS WECK HORIZON SMALL-WIDE (RED) 24: Type: IMPLANTABLE DEVICE | Status: FUNCTIONAL

## 2024-06-26 DEVICE — SURGCEL 4 X 8": Type: IMPLANTABLE DEVICE | Status: FUNCTIONAL

## 2024-06-26 DEVICE — PEG KT SAFETY 20FR: Type: IMPLANTABLE DEVICE | Status: FUNCTIONAL

## 2024-06-26 DEVICE — CLIP APPLIER ETHICON LIGACLIP 9 3/8" SMALL: Type: IMPLANTABLE DEVICE | Status: FUNCTIONAL

## 2024-06-26 DEVICE — CLIP APPLIER ETHICON LIGACLIP 11.5" MEDIUM: Type: IMPLANTABLE DEVICE | Status: FUNCTIONAL

## 2024-06-26 DEVICE — COUPLER VESSEL ANASTOMOTIC 3MM: Type: IMPLANTABLE DEVICE | Status: FUNCTIONAL

## 2024-06-26 DEVICE — DOPPLER PROBE DISPOSABLE: Type: IMPLANTABLE DEVICE | Status: FUNCTIONAL

## 2024-06-26 DEVICE — LIGATING CLIPS SYNOVIS SUPERFINE MICROCLIP 6: Type: IMPLANTABLE DEVICE | Status: FUNCTIONAL

## 2024-06-26 RX ORDER — DEXTROSE MONOHYDRATE 100 MG/ML
125 INJECTION, SOLUTION INTRAVENOUS ONCE
Refills: 0 | Status: DISCONTINUED | OUTPATIENT
Start: 2024-06-26 | End: 2024-06-28

## 2024-06-26 RX ORDER — GLUCAGON HYDROCHLORIDE 1 MG/ML
1 INJECTION, POWDER, FOR SOLUTION INTRAMUSCULAR; INTRAVENOUS; SUBCUTANEOUS ONCE
Refills: 0 | Status: DISCONTINUED | OUTPATIENT
Start: 2024-06-26 | End: 2024-06-28

## 2024-06-26 RX ORDER — ENOXAPARIN SODIUM 100 MG/ML
40 INJECTION SUBCUTANEOUS EVERY 24 HOURS
Refills: 0 | Status: DISCONTINUED | OUTPATIENT
Start: 2024-06-27 | End: 2024-06-27

## 2024-06-26 RX ORDER — BACITRACIN 500 UNIT/G
1 OINTMENT (GRAM) TOPICAL EVERY 12 HOURS
Refills: 0 | Status: DISCONTINUED | OUTPATIENT
Start: 2024-06-26 | End: 2024-06-27

## 2024-06-26 RX ORDER — DEXTROSE MONOHYDRATE AND SODIUM CHLORIDE 5; .3 G/100ML; G/100ML
500 INJECTION, SOLUTION INTRAVENOUS ONCE
Refills: 0 | Status: COMPLETED | OUTPATIENT
Start: 2024-06-26 | End: 2024-06-26

## 2024-06-26 RX ORDER — INSULIN LISPRO 100 [IU]/ML
INJECTION, SOLUTION SUBCUTANEOUS EVERY 6 HOURS
Refills: 0 | Status: DISCONTINUED | OUTPATIENT
Start: 2024-06-26 | End: 2024-06-28

## 2024-06-26 RX ORDER — MAGNESIUM SULFATE 100 %
1 POWDER (GRAM) MISCELLANEOUS ONCE
Refills: 0 | Status: COMPLETED | OUTPATIENT
Start: 2024-06-26 | End: 2024-06-26

## 2024-06-26 RX ORDER — LEVOTHYROXINE SODIUM 125 MCG
1 TABLET ORAL
Qty: 0 | Refills: 0 | DISCHARGE

## 2024-06-26 RX ORDER — AMPICILLIN AND SULBACTAM 2; 1 G/20ML; G/20ML
3 INJECTION, POWDER, FOR SOLUTION INTRAMUSCULAR; INTRAVENOUS EVERY 6 HOURS
Refills: 0 | Status: COMPLETED | OUTPATIENT
Start: 2024-06-27 | End: 2024-06-27

## 2024-06-26 RX ORDER — INSULIN LISPRO 100 [IU]/ML
INJECTION, SOLUTION SUBCUTANEOUS
Refills: 0 | Status: DISCONTINUED | OUTPATIENT
Start: 2024-06-26 | End: 2024-06-26

## 2024-06-26 RX ORDER — POLYETHYLENE GLYCOL 3350 1 G/G
17 POWDER ORAL DAILY
Refills: 0 | Status: DISCONTINUED | OUTPATIENT
Start: 2024-06-26 | End: 2024-07-01

## 2024-06-26 RX ORDER — OXYCODONE HYDROCHLORIDE 100 MG/5ML
10 SOLUTION ORAL EVERY 6 HOURS
Refills: 0 | Status: DISCONTINUED | OUTPATIENT
Start: 2024-06-26 | End: 2024-07-03

## 2024-06-26 RX ORDER — DEXTROSE 30 % IN WATER 30 %
15 VIAL (ML) INTRAVENOUS ONCE
Refills: 0 | Status: DISCONTINUED | OUTPATIENT
Start: 2024-06-26 | End: 2024-06-28

## 2024-06-26 RX ORDER — DEXTROSE MONOHYDRATE AND SODIUM CHLORIDE 5; .3 G/100ML; G/100ML
1000 INJECTION, SOLUTION INTRAVENOUS
Refills: 0 | Status: DISCONTINUED | OUTPATIENT
Start: 2024-06-26 | End: 2024-06-28

## 2024-06-26 RX ORDER — TAMSULOSIN HYDROCHLORIDE 0.4 MG/1
1 CAPSULE ORAL
Refills: 0 | DISCHARGE

## 2024-06-26 RX ORDER — AMPICILLIN AND SULBACTAM 2; 1 G/20ML; G/20ML
3 INJECTION, POWDER, FOR SOLUTION INTRAMUSCULAR; INTRAVENOUS EVERY 6 HOURS
Refills: 0 | Status: DISCONTINUED | OUTPATIENT
Start: 2024-06-26 | End: 2024-06-26

## 2024-06-26 RX ORDER — AMPICILLIN AND SULBACTAM 2; 1 G/20ML; G/20ML
3 INJECTION, POWDER, FOR SOLUTION INTRAMUSCULAR; INTRAVENOUS EVERY 6 HOURS
Refills: 0 | Status: COMPLETED | OUTPATIENT
Start: 2024-06-26 | End: 2024-06-26

## 2024-06-26 RX ORDER — METOPROLOL TARTRATE 50 MG
1 TABLET ORAL
Qty: 0 | Refills: 0 | DISCHARGE

## 2024-06-26 RX ORDER — LEVOTHYROXINE SODIUM 25 MCG
20 TABLET ORAL
Refills: 0 | Status: DISCONTINUED | OUTPATIENT
Start: 2024-06-26 | End: 2024-07-03

## 2024-06-26 RX ORDER — ACETAMINOPHEN 325 MG
650 TABLET ORAL EVERY 6 HOURS
Refills: 0 | Status: DISCONTINUED | OUTPATIENT
Start: 2024-06-26 | End: 2024-07-01

## 2024-06-26 RX ORDER — OXYCODONE HYDROCHLORIDE 100 MG/5ML
5 SOLUTION ORAL EVERY 6 HOURS
Refills: 0 | Status: DISCONTINUED | OUTPATIENT
Start: 2024-06-26 | End: 2024-07-03

## 2024-06-26 RX ORDER — DEXTROSE 30 % IN WATER 30 %
25 VIAL (ML) INTRAVENOUS ONCE
Refills: 0 | Status: DISCONTINUED | OUTPATIENT
Start: 2024-06-26 | End: 2024-06-28

## 2024-06-26 RX ORDER — THYROID 120 MG
0 TABLET ORAL
Refills: 0 | DISCHARGE

## 2024-06-26 RX ADMIN — AMPICILLIN AND SULBACTAM 200 GRAM(S): 2; 1 INJECTION, POWDER, FOR SOLUTION INTRAMUSCULAR; INTRAVENOUS at 19:28

## 2024-06-26 RX ADMIN — Medication 100 GRAM(S): at 15:34

## 2024-06-26 RX ADMIN — DEXTROSE MONOHYDRATE AND SODIUM CHLORIDE 100 MILLILITER(S): 5; .3 INJECTION, SOLUTION INTRAVENOUS at 15:16

## 2024-06-26 RX ADMIN — Medication 650 MILLIGRAM(S): at 19:59

## 2024-06-26 RX ADMIN — Medication 15 MILLILITER(S): at 21:01

## 2024-06-26 RX ADMIN — Medication 650 MILLIGRAM(S): at 19:27

## 2024-06-27 LAB
A1C WITH ESTIMATED AVERAGE GLUCOSE RESULT: 5.2 % — SIGNIFICANT CHANGE UP (ref 4–5.6)
ADD ON TEST-SPECIMEN IN LAB: SIGNIFICANT CHANGE UP
ANION GAP SERPL CALC-SCNC: 6 MMOL/L — SIGNIFICANT CHANGE UP (ref 5–17)
ANION GAP SERPL CALC-SCNC: 7 MMOL/L — SIGNIFICANT CHANGE UP (ref 5–17)
APTT BLD: 34.5 SEC — SIGNIFICANT CHANGE UP (ref 24.5–35.6)
BASE EXCESS BLDA CALC-SCNC: 4.2 MMOL/L — HIGH (ref -2–3)
BUN SERPL-MCNC: 18 MG/DL — SIGNIFICANT CHANGE UP (ref 7–23)
BUN SERPL-MCNC: 18 MG/DL — SIGNIFICANT CHANGE UP (ref 7–23)
CALCIUM SERPL-MCNC: 8.2 MG/DL — LOW (ref 8.4–10.5)
CALCIUM SERPL-MCNC: 8.6 MG/DL — SIGNIFICANT CHANGE UP (ref 8.4–10.5)
CHLORIDE SERPL-SCNC: 100 MMOL/L — SIGNIFICANT CHANGE UP (ref 96–108)
CHLORIDE SERPL-SCNC: 102 MMOL/L — SIGNIFICANT CHANGE UP (ref 96–108)
CO2 BLDA-SCNC: 32 MMOL/L — HIGH (ref 19–24)
CO2 SERPL-SCNC: 29 MMOL/L — SIGNIFICANT CHANGE UP (ref 22–31)
CO2 SERPL-SCNC: 29 MMOL/L — SIGNIFICANT CHANGE UP (ref 22–31)
CREAT SERPL-MCNC: 0.56 MG/DL — SIGNIFICANT CHANGE UP (ref 0.5–1.3)
CREAT SERPL-MCNC: 0.62 MG/DL — SIGNIFICANT CHANGE UP (ref 0.5–1.3)
EGFR: 95 ML/MIN/1.73M2 — SIGNIFICANT CHANGE UP
EGFR: 98 ML/MIN/1.73M2 — SIGNIFICANT CHANGE UP
ESTIMATED AVERAGE GLUCOSE: 103 MG/DL — SIGNIFICANT CHANGE UP (ref 68–114)
GLUCOSE BLDC GLUCOMTR-MCNC: 112 MG/DL — HIGH (ref 70–99)
GLUCOSE BLDC GLUCOMTR-MCNC: 113 MG/DL — HIGH (ref 70–99)
GLUCOSE BLDC GLUCOMTR-MCNC: 115 MG/DL — HIGH (ref 70–99)
GLUCOSE SERPL-MCNC: 122 MG/DL — HIGH (ref 70–99)
GLUCOSE SERPL-MCNC: 142 MG/DL — HIGH (ref 70–99)
HCO3 BLDA-SCNC: 30 MMOL/L — HIGH (ref 21–28)
HCT VFR BLD CALC: 27.4 % — LOW (ref 39–50)
HCT VFR BLD CALC: 29.3 % — LOW (ref 39–50)
HGB BLD-MCNC: 9.2 G/DL — LOW (ref 13–17)
HGB BLD-MCNC: 9.4 G/DL — LOW (ref 13–17)
INR BLD: 1.07 — SIGNIFICANT CHANGE UP (ref 0.85–1.18)
LACTATE SERPL-SCNC: 1.6 MMOL/L — SIGNIFICANT CHANGE UP (ref 0.5–2)
MAGNESIUM SERPL-MCNC: 2 MG/DL — SIGNIFICANT CHANGE UP (ref 1.6–2.6)
MCHC RBC-ENTMCNC: 29.7 PG — SIGNIFICANT CHANGE UP (ref 27–34)
MCHC RBC-ENTMCNC: 30.3 PG — SIGNIFICANT CHANGE UP (ref 27–34)
MCHC RBC-ENTMCNC: 32.1 GM/DL — SIGNIFICANT CHANGE UP (ref 32–36)
MCHC RBC-ENTMCNC: 33.6 GM/DL — SIGNIFICANT CHANGE UP (ref 32–36)
MCV RBC AUTO: 90.1 FL — SIGNIFICANT CHANGE UP (ref 80–100)
MCV RBC AUTO: 92.4 FL — SIGNIFICANT CHANGE UP (ref 80–100)
NRBC # BLD: 0 /100 WBCS — SIGNIFICANT CHANGE UP (ref 0–0)
NRBC # BLD: 0 /100 WBCS — SIGNIFICANT CHANGE UP (ref 0–0)
PCO2 BLDA: 50 MMHG — HIGH (ref 35–48)
PH BLDA: 7.39 — SIGNIFICANT CHANGE UP (ref 7.35–7.45)
PHOSPHATE SERPL-MCNC: 3.7 MG/DL — SIGNIFICANT CHANGE UP (ref 2.5–4.5)
PLATELET # BLD AUTO: 202 K/UL — SIGNIFICANT CHANGE UP (ref 150–400)
PLATELET # BLD AUTO: 221 K/UL — SIGNIFICANT CHANGE UP (ref 150–400)
PO2 BLDA: 55 MMHG — LOW (ref 83–108)
POTASSIUM SERPL-MCNC: 4.1 MMOL/L — SIGNIFICANT CHANGE UP (ref 3.5–5.3)
POTASSIUM SERPL-MCNC: 4.4 MMOL/L — SIGNIFICANT CHANGE UP (ref 3.5–5.3)
POTASSIUM SERPL-SCNC: 4.1 MMOL/L — SIGNIFICANT CHANGE UP (ref 3.5–5.3)
POTASSIUM SERPL-SCNC: 4.4 MMOL/L — SIGNIFICANT CHANGE UP (ref 3.5–5.3)
PROTHROM AB SERPL-ACNC: 12.2 SEC — SIGNIFICANT CHANGE UP (ref 9.5–13)
RBC # BLD: 3.04 M/UL — LOW (ref 4.2–5.8)
RBC # BLD: 3.17 M/UL — LOW (ref 4.2–5.8)
RBC # FLD: 12.7 % — SIGNIFICANT CHANGE UP (ref 10.3–14.5)
RBC # FLD: 12.9 % — SIGNIFICANT CHANGE UP (ref 10.3–14.5)
SAO2 % BLDA: 88.7 % — LOW (ref 94–98)
SODIUM SERPL-SCNC: 136 MMOL/L — SIGNIFICANT CHANGE UP (ref 135–145)
SODIUM SERPL-SCNC: 137 MMOL/L — SIGNIFICANT CHANGE UP (ref 135–145)
TSH SERPL-MCNC: 0.54 UIU/ML — SIGNIFICANT CHANGE UP (ref 0.27–4.2)
WBC # BLD: 12.25 K/UL — HIGH (ref 3.8–10.5)
WBC # BLD: 12.99 K/UL — HIGH (ref 3.8–10.5)
WBC # FLD AUTO: 12.25 K/UL — HIGH (ref 3.8–10.5)
WBC # FLD AUTO: 12.99 K/UL — HIGH (ref 3.8–10.5)

## 2024-06-27 PROCEDURE — 71045 X-RAY EXAM CHEST 1 VIEW: CPT | Mod: 26

## 2024-06-27 PROCEDURE — 99233 SBSQ HOSP IP/OBS HIGH 50: CPT | Mod: GC

## 2024-06-27 RX ORDER — LIDOCAINE HCL 28 MG/G
1 GEL TOPICAL ONCE
Refills: 0 | Status: COMPLETED | OUTPATIENT
Start: 2024-06-27 | End: 2024-06-27

## 2024-06-27 RX ORDER — FAMOTIDINE 40 MG
20 TABLET ORAL DAILY
Refills: 0 | Status: DISCONTINUED | OUTPATIENT
Start: 2024-06-27 | End: 2024-07-03

## 2024-06-27 RX ORDER — OXYMETAZOLINE HYDROCHLORIDE 0.05 G/100ML
2 SPRAY NASAL EVERY 12 HOURS
Refills: 0 | Status: DISCONTINUED | OUTPATIENT
Start: 2024-06-27 | End: 2024-07-01

## 2024-06-27 RX ORDER — ONDANSETRON HYDROCHLORIDE 2 MG/ML
4 INJECTION INTRAMUSCULAR; INTRAVENOUS EVERY 6 HOURS
Refills: 0 | Status: DISCONTINUED | OUTPATIENT
Start: 2024-06-27 | End: 2024-07-03

## 2024-06-27 RX ORDER — TAMSULOSIN HYDROCHLORIDE 0.4 MG/1
0.4 CAPSULE ORAL DAILY
Refills: 0 | Status: DISCONTINUED | OUTPATIENT
Start: 2024-06-27 | End: 2024-06-27

## 2024-06-27 RX ORDER — DEXTROSE MONOHYDRATE AND SODIUM CHLORIDE 5; .3 G/100ML; G/100ML
500 INJECTION, SOLUTION INTRAVENOUS ONCE
Refills: 0 | Status: COMPLETED | OUTPATIENT
Start: 2024-06-27 | End: 2024-06-27

## 2024-06-27 RX ORDER — SENNOSIDES 8.6 MG
10 TABLET ORAL DAILY
Refills: 0 | Status: DISCONTINUED | OUTPATIENT
Start: 2024-06-27 | End: 2024-07-01

## 2024-06-27 RX ORDER — SODIUM CHLORIDE 0.65 %
1 AEROSOL, SPRAY (ML) NASAL EVERY 6 HOURS
Refills: 0 | Status: DISCONTINUED | OUTPATIENT
Start: 2024-06-27 | End: 2024-07-01

## 2024-06-27 RX ORDER — OXYMETAZOLINE HYDROCHLORIDE 0.05 G/100ML
1 SPRAY NASAL ONCE
Refills: 0 | Status: COMPLETED | OUTPATIENT
Start: 2024-06-27 | End: 2024-06-27

## 2024-06-27 RX ORDER — PANTOPRAZOLE SODIUM 40 MG/10ML
40 INJECTION, POWDER, FOR SOLUTION INTRAVENOUS DAILY
Refills: 0 | Status: DISCONTINUED | OUTPATIENT
Start: 2024-06-27 | End: 2024-06-27

## 2024-06-27 RX ORDER — TRANEXAMIC ACID 100 MG/ML
10 INJECTION, SOLUTION INTRAVENOUS ONCE
Refills: 0 | Status: COMPLETED | OUTPATIENT
Start: 2024-06-27 | End: 2024-06-27

## 2024-06-27 RX ORDER — TAMSULOSIN HYDROCHLORIDE 0.4 MG/1
0.4 CAPSULE ORAL AT BEDTIME
Refills: 0 | Status: DISCONTINUED | OUTPATIENT
Start: 2024-06-27 | End: 2024-06-27

## 2024-06-27 RX ORDER — FAMOTIDINE 40 MG
40 TABLET ORAL DAILY
Refills: 0 | Status: DISCONTINUED | OUTPATIENT
Start: 2024-06-27 | End: 2024-06-27

## 2024-06-27 RX ORDER — TAMSULOSIN HYDROCHLORIDE 0.4 MG/1
0.4 CAPSULE ORAL AT BEDTIME
Refills: 0 | Status: DISCONTINUED | OUTPATIENT
Start: 2024-06-27 | End: 2024-07-01

## 2024-06-27 RX ORDER — HYDROMORPHONE HCL 0.2 MG/ML
0.25 INJECTION, SOLUTION INTRAVENOUS EVERY 6 HOURS
Refills: 0 | Status: DISCONTINUED | OUTPATIENT
Start: 2024-06-27 | End: 2024-07-03

## 2024-06-27 RX ADMIN — AMPICILLIN AND SULBACTAM 200 GRAM(S): 2; 1 INJECTION, POWDER, FOR SOLUTION INTRAMUSCULAR; INTRAVENOUS at 12:56

## 2024-06-27 RX ADMIN — Medication 650 MILLIGRAM(S): at 07:02

## 2024-06-27 RX ADMIN — LIDOCAINE HCL 1 PATCH: 28 GEL TOPICAL at 22:04

## 2024-06-27 RX ADMIN — LIDOCAINE HCL 1 PATCH: 28 GEL TOPICAL at 18:44

## 2024-06-27 RX ADMIN — LIDOCAINE HCL 1 PATCH: 28 GEL TOPICAL at 09:56

## 2024-06-27 RX ADMIN — Medication 15 MILLILITER(S): at 18:43

## 2024-06-27 RX ADMIN — Medication 650 MILLIGRAM(S): at 11:48

## 2024-06-27 RX ADMIN — Medication 1 APPLICATION(S): at 07:02

## 2024-06-27 RX ADMIN — Medication 650 MILLIGRAM(S): at 12:30

## 2024-06-27 RX ADMIN — Medication 650 MILLIGRAM(S): at 18:43

## 2024-06-27 RX ADMIN — Medication 650 MILLIGRAM(S): at 00:08

## 2024-06-27 RX ADMIN — Medication 20 MICROGRAM(S): at 07:41

## 2024-06-27 RX ADMIN — AMPICILLIN AND SULBACTAM 200 GRAM(S): 2; 1 INJECTION, POWDER, FOR SOLUTION INTRAMUSCULAR; INTRAVENOUS at 07:02

## 2024-06-27 RX ADMIN — POLYETHYLENE GLYCOL 3350 17 GRAM(S): 1 POWDER ORAL at 11:48

## 2024-06-27 RX ADMIN — OXYMETAZOLINE HYDROCHLORIDE 1 SPRAY(S): 0.05 SPRAY NASAL at 17:20

## 2024-06-27 RX ADMIN — TAMSULOSIN HYDROCHLORIDE 0.4 MILLIGRAM(S): 0.4 CAPSULE ORAL at 11:48

## 2024-06-27 RX ADMIN — DEXTROSE MONOHYDRATE AND SODIUM CHLORIDE 100 MILLILITER(S): 5; .3 INJECTION, SOLUTION INTRAVENOUS at 11:55

## 2024-06-27 RX ADMIN — Medication 15 MILLILITER(S): at 07:02

## 2024-06-27 RX ADMIN — INSULIN LISPRO 2: 100 INJECTION, SOLUTION SUBCUTANEOUS at 00:07

## 2024-06-27 RX ADMIN — DEXTROSE MONOHYDRATE AND SODIUM CHLORIDE 1000 MILLILITER(S): 5; .3 INJECTION, SOLUTION INTRAVENOUS at 00:09

## 2024-06-27 RX ADMIN — TRANEXAMIC ACID 10 MILLILITER(S): 100 INJECTION, SOLUTION INTRAVENOUS at 17:20

## 2024-06-27 RX ADMIN — Medication 10 MILLILITER(S): at 18:42

## 2024-06-27 RX ADMIN — DEXTROSE MONOHYDRATE AND SODIUM CHLORIDE 1000 MILLILITER(S): 5; .3 INJECTION, SOLUTION INTRAVENOUS at 04:17

## 2024-06-27 RX ADMIN — AMPICILLIN AND SULBACTAM 200 GRAM(S): 2; 1 INJECTION, POWDER, FOR SOLUTION INTRAMUSCULAR; INTRAVENOUS at 00:08

## 2024-06-27 RX ADMIN — Medication 650 MILLIGRAM(S): at 23:15

## 2024-06-27 RX ADMIN — TAMSULOSIN HYDROCHLORIDE 0.4 MILLIGRAM(S): 0.4 CAPSULE ORAL at 23:15

## 2024-06-27 RX ADMIN — Medication 650 MILLIGRAM(S): at 19:30

## 2024-06-27 RX ADMIN — DEXTROSE MONOHYDRATE AND SODIUM CHLORIDE 500 MILLILITER(S): 5; .3 INJECTION, SOLUTION INTRAVENOUS at 18:42

## 2024-06-27 RX ADMIN — ENOXAPARIN SODIUM 40 MILLIGRAM(S): 100 INJECTION SUBCUTANEOUS at 11:48

## 2024-06-28 LAB
ANION GAP SERPL CALC-SCNC: 5 MMOL/L — SIGNIFICANT CHANGE UP (ref 5–17)
BUN SERPL-MCNC: 16 MG/DL — SIGNIFICANT CHANGE UP (ref 7–23)
CALCIUM SERPL-MCNC: 8.2 MG/DL — LOW (ref 8.4–10.5)
CHLORIDE SERPL-SCNC: 101 MMOL/L — SIGNIFICANT CHANGE UP (ref 96–108)
CO2 SERPL-SCNC: 31 MMOL/L — SIGNIFICANT CHANGE UP (ref 22–31)
CREAT SERPL-MCNC: 0.59 MG/DL — SIGNIFICANT CHANGE UP (ref 0.5–1.3)
EGFR: 96 ML/MIN/1.73M2 — SIGNIFICANT CHANGE UP
GLUCOSE BLDC GLUCOMTR-MCNC: 100 MG/DL — HIGH (ref 70–99)
GLUCOSE BLDC GLUCOMTR-MCNC: 92 MG/DL — SIGNIFICANT CHANGE UP (ref 70–99)
GLUCOSE BLDC GLUCOMTR-MCNC: 92 MG/DL — SIGNIFICANT CHANGE UP (ref 70–99)
GLUCOSE SERPL-MCNC: 104 MG/DL — HIGH (ref 70–99)
HCT VFR BLD CALC: 24.9 % — LOW (ref 39–50)
HCT VFR BLD CALC: 28.7 % — LOW (ref 39–50)
HGB BLD-MCNC: 8 G/DL — LOW (ref 13–17)
HGB BLD-MCNC: 9.1 G/DL — LOW (ref 13–17)
MAGNESIUM SERPL-MCNC: 1.8 MG/DL — SIGNIFICANT CHANGE UP (ref 1.6–2.6)
MCHC RBC-ENTMCNC: 29.3 PG — SIGNIFICANT CHANGE UP (ref 27–34)
MCHC RBC-ENTMCNC: 29.7 PG — SIGNIFICANT CHANGE UP (ref 27–34)
MCHC RBC-ENTMCNC: 31.7 GM/DL — LOW (ref 32–36)
MCHC RBC-ENTMCNC: 32.1 GM/DL — SIGNIFICANT CHANGE UP (ref 32–36)
MCV RBC AUTO: 91.2 FL — SIGNIFICANT CHANGE UP (ref 80–100)
MCV RBC AUTO: 93.8 FL — SIGNIFICANT CHANGE UP (ref 80–100)
NRBC # BLD: 0 /100 WBCS — SIGNIFICANT CHANGE UP (ref 0–0)
NRBC # BLD: 0 /100 WBCS — SIGNIFICANT CHANGE UP (ref 0–0)
PHOSPHATE SERPL-MCNC: 2 MG/DL — LOW (ref 2.5–4.5)
PLATELET # BLD AUTO: 168 K/UL — SIGNIFICANT CHANGE UP (ref 150–400)
PLATELET # BLD AUTO: 206 K/UL — SIGNIFICANT CHANGE UP (ref 150–400)
POTASSIUM SERPL-MCNC: 3.7 MMOL/L — SIGNIFICANT CHANGE UP (ref 3.5–5.3)
POTASSIUM SERPL-SCNC: 3.7 MMOL/L — SIGNIFICANT CHANGE UP (ref 3.5–5.3)
RBC # BLD: 2.73 M/UL — LOW (ref 4.2–5.8)
RBC # BLD: 3.06 M/UL — LOW (ref 4.2–5.8)
RBC # FLD: 12.9 % — SIGNIFICANT CHANGE UP (ref 10.3–14.5)
RBC # FLD: 13 % — SIGNIFICANT CHANGE UP (ref 10.3–14.5)
SODIUM SERPL-SCNC: 137 MMOL/L — SIGNIFICANT CHANGE UP (ref 135–145)
WBC # BLD: 6.92 K/UL — SIGNIFICANT CHANGE UP (ref 3.8–10.5)
WBC # BLD: 9.95 K/UL — SIGNIFICANT CHANGE UP (ref 3.8–10.5)
WBC # FLD AUTO: 6.92 K/UL — SIGNIFICANT CHANGE UP (ref 3.8–10.5)
WBC # FLD AUTO: 9.95 K/UL — SIGNIFICANT CHANGE UP (ref 3.8–10.5)

## 2024-06-28 PROCEDURE — 71045 X-RAY EXAM CHEST 1 VIEW: CPT | Mod: 26

## 2024-06-28 PROCEDURE — 99233 SBSQ HOSP IP/OBS HIGH 50: CPT | Mod: GC

## 2024-06-28 RX ORDER — SOD PHOS DI, MONO/K PHOS MONO 250 MG
1 TABLET ORAL ONCE
Refills: 0 | Status: COMPLETED | OUTPATIENT
Start: 2024-06-28 | End: 2024-06-28

## 2024-06-28 RX ORDER — ENOXAPARIN SODIUM 100 MG/ML
40 INJECTION SUBCUTANEOUS EVERY 24 HOURS
Refills: 0 | Status: DISCONTINUED | OUTPATIENT
Start: 2024-06-28 | End: 2024-07-03

## 2024-06-28 RX ORDER — MAGNESIUM SULFATE 100 %
2 POWDER (GRAM) MISCELLANEOUS ONCE
Refills: 0 | Status: COMPLETED | OUTPATIENT
Start: 2024-06-28 | End: 2024-06-28

## 2024-06-28 RX ADMIN — Medication 1 SPRAY(S): at 07:30

## 2024-06-28 RX ADMIN — Medication 650 MILLIGRAM(S): at 00:03

## 2024-06-28 RX ADMIN — OXYMETAZOLINE HYDROCHLORIDE 2 SPRAY(S): 0.05 SPRAY NASAL at 00:00

## 2024-06-28 RX ADMIN — Medication 650 MILLIGRAM(S): at 12:28

## 2024-06-28 RX ADMIN — Medication 1 PACKET(S): at 07:22

## 2024-06-28 RX ADMIN — Medication 1 SPRAY(S): at 17:38

## 2024-06-28 RX ADMIN — Medication 20 MICROGRAM(S): at 07:23

## 2024-06-28 RX ADMIN — Medication 1 PACKET(S): at 12:28

## 2024-06-28 RX ADMIN — Medication 1 SPRAY(S): at 00:04

## 2024-06-28 RX ADMIN — TAMSULOSIN HYDROCHLORIDE 0.4 MILLIGRAM(S): 0.4 CAPSULE ORAL at 22:56

## 2024-06-28 RX ADMIN — Medication 1 SPRAY(S): at 12:30

## 2024-06-28 RX ADMIN — Medication 15 MILLILITER(S): at 07:21

## 2024-06-28 RX ADMIN — Medication 20 MILLIGRAM(S): at 12:28

## 2024-06-28 RX ADMIN — Medication 650 MILLIGRAM(S): at 17:38

## 2024-06-28 RX ADMIN — Medication 15 MILLILITER(S): at 17:38

## 2024-06-28 RX ADMIN — Medication 1 APPLICATION(S): at 07:23

## 2024-06-28 RX ADMIN — Medication 650 MILLIGRAM(S): at 07:22

## 2024-06-28 RX ADMIN — OXYMETAZOLINE HYDROCHLORIDE 2 SPRAY(S): 0.05 SPRAY NASAL at 12:29

## 2024-06-28 RX ADMIN — Medication 650 MILLIGRAM(S): at 13:00

## 2024-06-28 RX ADMIN — Medication 650 MILLIGRAM(S): at 08:00

## 2024-06-28 RX ADMIN — Medication 25 GRAM(S): at 09:49

## 2024-06-28 RX ADMIN — Medication 650 MILLIGRAM(S): at 18:00

## 2024-06-29 LAB
ANION GAP SERPL CALC-SCNC: 7 MMOL/L — SIGNIFICANT CHANGE UP (ref 5–17)
BUN SERPL-MCNC: 14 MG/DL — SIGNIFICANT CHANGE UP (ref 7–23)
CALCIUM SERPL-MCNC: 8.5 MG/DL — SIGNIFICANT CHANGE UP (ref 8.4–10.5)
CHLORIDE SERPL-SCNC: 101 MMOL/L — SIGNIFICANT CHANGE UP (ref 96–108)
CO2 SERPL-SCNC: 29 MMOL/L — SIGNIFICANT CHANGE UP (ref 22–31)
CREAT SERPL-MCNC: 0.58 MG/DL — SIGNIFICANT CHANGE UP (ref 0.5–1.3)
EGFR: 97 ML/MIN/1.73M2 — SIGNIFICANT CHANGE UP
GLUCOSE SERPL-MCNC: 93 MG/DL — SIGNIFICANT CHANGE UP (ref 70–99)
HCT VFR BLD CALC: 27.7 % — LOW (ref 39–50)
HGB BLD-MCNC: 8.4 G/DL — LOW (ref 13–17)
MAGNESIUM SERPL-MCNC: 2.1 MG/DL — SIGNIFICANT CHANGE UP (ref 1.6–2.6)
MCHC RBC-ENTMCNC: 29 PG — SIGNIFICANT CHANGE UP (ref 27–34)
MCHC RBC-ENTMCNC: 30.3 GM/DL — LOW (ref 32–36)
MCV RBC AUTO: 95.5 FL — SIGNIFICANT CHANGE UP (ref 80–100)
NRBC # BLD: 0 /100 WBCS — SIGNIFICANT CHANGE UP (ref 0–0)
PHOSPHATE SERPL-MCNC: 2.5 MG/DL — SIGNIFICANT CHANGE UP (ref 2.5–4.5)
PLATELET # BLD AUTO: 194 K/UL — SIGNIFICANT CHANGE UP (ref 150–400)
POTASSIUM SERPL-MCNC: 3.5 MMOL/L — SIGNIFICANT CHANGE UP (ref 3.5–5.3)
POTASSIUM SERPL-SCNC: 3.5 MMOL/L — SIGNIFICANT CHANGE UP (ref 3.5–5.3)
RBC # BLD: 2.9 M/UL — LOW (ref 4.2–5.8)
RBC # FLD: 12.7 % — SIGNIFICANT CHANGE UP (ref 10.3–14.5)
SODIUM SERPL-SCNC: 137 MMOL/L — SIGNIFICANT CHANGE UP (ref 135–145)
WBC # BLD: 7.21 K/UL — SIGNIFICANT CHANGE UP (ref 3.8–10.5)
WBC # FLD AUTO: 7.21 K/UL — SIGNIFICANT CHANGE UP (ref 3.8–10.5)

## 2024-06-29 PROCEDURE — 71045 X-RAY EXAM CHEST 1 VIEW: CPT | Mod: 26

## 2024-06-29 RX ORDER — METOPROLOL TARTRATE 50 MG
2.5 TABLET ORAL ONCE
Refills: 0 | Status: DISCONTINUED | OUTPATIENT
Start: 2024-06-29 | End: 2024-06-30

## 2024-06-29 RX ORDER — DEXTROSE MONOHYDRATE AND SODIUM CHLORIDE 5; .3 G/100ML; G/100ML
1000 INJECTION, SOLUTION INTRAVENOUS ONCE
Refills: 0 | Status: COMPLETED | OUTPATIENT
Start: 2024-06-29 | End: 2024-06-29

## 2024-06-29 RX ADMIN — Medication 650 MILLIGRAM(S): at 12:36

## 2024-06-29 RX ADMIN — Medication 1 SPRAY(S): at 00:00

## 2024-06-29 RX ADMIN — DEXTROSE MONOHYDRATE AND SODIUM CHLORIDE 500 MILLILITER(S): 5; .3 INJECTION, SOLUTION INTRAVENOUS at 23:10

## 2024-06-29 RX ADMIN — Medication 20 MICROGRAM(S): at 08:30

## 2024-06-29 RX ADMIN — Medication 650 MILLIGRAM(S): at 05:51

## 2024-06-29 RX ADMIN — Medication 15 MILLILITER(S): at 05:51

## 2024-06-29 RX ADMIN — POLYETHYLENE GLYCOL 3350 17 GRAM(S): 1 POWDER ORAL at 12:36

## 2024-06-29 RX ADMIN — Medication 650 MILLIGRAM(S): at 06:00

## 2024-06-29 RX ADMIN — Medication 650 MILLIGRAM(S): at 01:00

## 2024-06-29 RX ADMIN — ENOXAPARIN SODIUM 40 MILLIGRAM(S): 100 INJECTION SUBCUTANEOUS at 19:10

## 2024-06-29 RX ADMIN — Medication 650 MILLIGRAM(S): at 00:27

## 2024-06-29 RX ADMIN — Medication 650 MILLIGRAM(S): at 13:23

## 2024-06-29 RX ADMIN — Medication 20 MILLIGRAM(S): at 12:37

## 2024-06-29 RX ADMIN — Medication 10 MILLILITER(S): at 12:36

## 2024-06-29 RX ADMIN — Medication 15 MILLILITER(S): at 19:10

## 2024-06-29 RX ADMIN — Medication 650 MILLIGRAM(S): at 19:11

## 2024-06-29 RX ADMIN — Medication 650 MILLIGRAM(S): at 19:53

## 2024-06-29 RX ADMIN — Medication 1 SPRAY(S): at 12:53

## 2024-06-29 RX ADMIN — Medication 1 SPRAY(S): at 05:54

## 2024-06-29 RX ADMIN — Medication 1 SPRAY(S): at 23:13

## 2024-06-29 RX ADMIN — Medication 650 MILLIGRAM(S): at 23:12

## 2024-06-29 RX ADMIN — Medication 1 SPRAY(S): at 19:12

## 2024-06-30 LAB
ANION GAP SERPL CALC-SCNC: 7 MMOL/L — SIGNIFICANT CHANGE UP (ref 5–17)
BUN SERPL-MCNC: 14 MG/DL — SIGNIFICANT CHANGE UP (ref 7–23)
CALCIUM SERPL-MCNC: 8.6 MG/DL — SIGNIFICANT CHANGE UP (ref 8.4–10.5)
CHLORIDE SERPL-SCNC: 100 MMOL/L — SIGNIFICANT CHANGE UP (ref 96–108)
CO2 SERPL-SCNC: 32 MMOL/L — HIGH (ref 22–31)
CREAT SERPL-MCNC: 0.61 MG/DL — SIGNIFICANT CHANGE UP (ref 0.5–1.3)
EGFR: 95 ML/MIN/1.73M2 — SIGNIFICANT CHANGE UP
GLUCOSE SERPL-MCNC: 125 MG/DL — HIGH (ref 70–99)
HCT VFR BLD CALC: 28.7 % — LOW (ref 39–50)
HGB BLD-MCNC: 9.3 G/DL — LOW (ref 13–17)
MAGNESIUM SERPL-MCNC: 2 MG/DL — SIGNIFICANT CHANGE UP (ref 1.6–2.6)
MCHC RBC-ENTMCNC: 29.6 PG — SIGNIFICANT CHANGE UP (ref 27–34)
MCHC RBC-ENTMCNC: 32.4 GM/DL — SIGNIFICANT CHANGE UP (ref 32–36)
MCV RBC AUTO: 91.4 FL — SIGNIFICANT CHANGE UP (ref 80–100)
NRBC # BLD: 0 /100 WBCS — SIGNIFICANT CHANGE UP (ref 0–0)
PHOSPHATE SERPL-MCNC: 2.6 MG/DL — SIGNIFICANT CHANGE UP (ref 2.5–4.5)
PLATELET # BLD AUTO: 224 K/UL — SIGNIFICANT CHANGE UP (ref 150–400)
POTASSIUM SERPL-MCNC: 3.8 MMOL/L — SIGNIFICANT CHANGE UP (ref 3.5–5.3)
POTASSIUM SERPL-SCNC: 3.8 MMOL/L — SIGNIFICANT CHANGE UP (ref 3.5–5.3)
RBC # BLD: 3.14 M/UL — LOW (ref 4.2–5.8)
RBC # FLD: 12.9 % — SIGNIFICANT CHANGE UP (ref 10.3–14.5)
SODIUM SERPL-SCNC: 139 MMOL/L — SIGNIFICANT CHANGE UP (ref 135–145)
WBC # BLD: 7.64 K/UL — SIGNIFICANT CHANGE UP (ref 3.8–10.5)
WBC # FLD AUTO: 7.64 K/UL — SIGNIFICANT CHANGE UP (ref 3.8–10.5)

## 2024-06-30 RX ORDER — METOPROLOL TARTRATE 50 MG
12.5 TABLET ORAL DAILY
Refills: 0 | Status: DISCONTINUED | OUTPATIENT
Start: 2024-06-30 | End: 2024-06-30

## 2024-06-30 RX ORDER — METOPROLOL TARTRATE 50 MG
12.5 TABLET ORAL
Refills: 0 | Status: DISCONTINUED | OUTPATIENT
Start: 2024-06-30 | End: 2024-06-30

## 2024-06-30 RX ORDER — METOPROLOL TARTRATE 50 MG
12.5 TABLET ORAL DAILY
Refills: 0 | Status: DISCONTINUED | OUTPATIENT
Start: 2024-06-30 | End: 2024-07-03

## 2024-06-30 RX ORDER — DEXTROSE MONOHYDRATE AND SODIUM CHLORIDE 5; .3 G/100ML; G/100ML
1000 INJECTION, SOLUTION INTRAVENOUS
Refills: 0 | Status: DISCONTINUED | OUTPATIENT
Start: 2024-06-30 | End: 2024-06-30

## 2024-06-30 RX ADMIN — Medication 1 SPRAY(S): at 11:19

## 2024-06-30 RX ADMIN — DEXTROSE MONOHYDRATE AND SODIUM CHLORIDE 60 MILLILITER(S): 5; .3 INJECTION, SOLUTION INTRAVENOUS at 15:20

## 2024-06-30 RX ADMIN — Medication 20 MICROGRAM(S): at 07:06

## 2024-06-30 RX ADMIN — Medication 15 MILLILITER(S): at 06:58

## 2024-06-30 RX ADMIN — ENOXAPARIN SODIUM 40 MILLIGRAM(S): 100 INJECTION SUBCUTANEOUS at 17:31

## 2024-06-30 RX ADMIN — Medication 20 MILLIGRAM(S): at 11:20

## 2024-06-30 RX ADMIN — Medication 1 SPRAY(S): at 17:32

## 2024-06-30 RX ADMIN — Medication 1 SPRAY(S): at 06:59

## 2024-06-30 RX ADMIN — Medication 15 MILLILITER(S): at 17:31

## 2024-06-30 RX ADMIN — DEXTROSE MONOHYDRATE AND SODIUM CHLORIDE 90 MILLILITER(S): 5; .3 INJECTION, SOLUTION INTRAVENOUS at 11:21

## 2024-06-30 RX ADMIN — Medication 12.5 MILLIGRAM(S): at 17:31

## 2024-07-01 LAB
ANION GAP SERPL CALC-SCNC: 5 MMOL/L — SIGNIFICANT CHANGE UP (ref 5–17)
APPEARANCE UR: CLEAR — SIGNIFICANT CHANGE UP
BILIRUB UR-MCNC: NEGATIVE — SIGNIFICANT CHANGE UP
BUN SERPL-MCNC: 17 MG/DL — SIGNIFICANT CHANGE UP (ref 7–23)
CALCIUM SERPL-MCNC: 8.6 MG/DL — SIGNIFICANT CHANGE UP (ref 8.4–10.5)
CHLORIDE SERPL-SCNC: 99 MMOL/L — SIGNIFICANT CHANGE UP (ref 96–108)
CO2 SERPL-SCNC: 32 MMOL/L — HIGH (ref 22–31)
COLOR SPEC: YELLOW — SIGNIFICANT CHANGE UP
CREAT SERPL-MCNC: 0.58 MG/DL — SIGNIFICANT CHANGE UP (ref 0.5–1.3)
DIFF PNL FLD: NEGATIVE — SIGNIFICANT CHANGE UP
EGFR: 97 ML/MIN/1.73M2 — SIGNIFICANT CHANGE UP
GLUCOSE SERPL-MCNC: 102 MG/DL — HIGH (ref 70–99)
GLUCOSE UR QL: NEGATIVE MG/DL — SIGNIFICANT CHANGE UP
HCT VFR BLD CALC: 26.5 % — LOW (ref 39–50)
HGB BLD-MCNC: 8.4 G/DL — LOW (ref 13–17)
KETONES UR-MCNC: NEGATIVE MG/DL — SIGNIFICANT CHANGE UP
LEUKOCYTE ESTERASE UR-ACNC: NEGATIVE — SIGNIFICANT CHANGE UP
MAGNESIUM SERPL-MCNC: 2 MG/DL — SIGNIFICANT CHANGE UP (ref 1.6–2.6)
MCHC RBC-ENTMCNC: 29.8 PG — SIGNIFICANT CHANGE UP (ref 27–34)
MCHC RBC-ENTMCNC: 31.7 GM/DL — LOW (ref 32–36)
MCV RBC AUTO: 94 FL — SIGNIFICANT CHANGE UP (ref 80–100)
NITRITE UR-MCNC: NEGATIVE — SIGNIFICANT CHANGE UP
NRBC # BLD: 0 /100 WBCS — SIGNIFICANT CHANGE UP (ref 0–0)
PH UR: 7 — SIGNIFICANT CHANGE UP (ref 5–8)
PHOSPHATE SERPL-MCNC: 2.6 MG/DL — SIGNIFICANT CHANGE UP (ref 2.5–4.5)
PLATELET # BLD AUTO: 217 K/UL — SIGNIFICANT CHANGE UP (ref 150–400)
POTASSIUM SERPL-MCNC: 3.8 MMOL/L — SIGNIFICANT CHANGE UP (ref 3.5–5.3)
POTASSIUM SERPL-SCNC: 3.8 MMOL/L — SIGNIFICANT CHANGE UP (ref 3.5–5.3)
PROT UR-MCNC: 100 MG/DL
RAPID RVP RESULT: SIGNIFICANT CHANGE UP
RBC # BLD: 2.82 M/UL — LOW (ref 4.2–5.8)
RBC # FLD: 13 % — SIGNIFICANT CHANGE UP (ref 10.3–14.5)
SARS-COV-2 RNA SPEC QL NAA+PROBE: SIGNIFICANT CHANGE UP
SODIUM SERPL-SCNC: 136 MMOL/L — SIGNIFICANT CHANGE UP (ref 135–145)
SP GR SPEC: 1.03 — SIGNIFICANT CHANGE UP (ref 1–1.03)
UROBILINOGEN FLD QL: 2 MG/DL (ref 0.2–1)
WBC # BLD: 6.17 K/UL — SIGNIFICANT CHANGE UP (ref 3.8–10.5)
WBC # FLD AUTO: 6.17 K/UL — SIGNIFICANT CHANGE UP (ref 3.8–10.5)

## 2024-07-01 PROCEDURE — 71045 X-RAY EXAM CHEST 1 VIEW: CPT | Mod: 26

## 2024-07-01 RX ORDER — DOXAZOSIN MESYLATE 2 MG/1
1 TABLET ORAL DAILY
Refills: 0 | Status: DISCONTINUED | OUTPATIENT
Start: 2024-07-01 | End: 2024-07-01

## 2024-07-01 RX ORDER — DEXTROSE MONOHYDRATE AND SODIUM CHLORIDE 5; .3 G/100ML; G/100ML
500 INJECTION, SOLUTION INTRAVENOUS ONCE
Refills: 0 | Status: DISCONTINUED | OUTPATIENT
Start: 2024-07-01 | End: 2024-07-01

## 2024-07-01 RX ORDER — SENNOSIDES 8.6 MG
10 TABLET ORAL DAILY
Refills: 0 | Status: DISCONTINUED | OUTPATIENT
Start: 2024-07-02 | End: 2024-07-03

## 2024-07-01 RX ORDER — DEXTROSE MONOHYDRATE AND SODIUM CHLORIDE 5; .3 G/100ML; G/100ML
500 INJECTION, SOLUTION INTRAVENOUS ONCE
Refills: 0 | Status: COMPLETED | OUTPATIENT
Start: 2024-07-01 | End: 2024-07-01

## 2024-07-01 RX ORDER — TAMSULOSIN HYDROCHLORIDE 0.4 MG/1
0.4 CAPSULE ORAL DAILY
Refills: 0 | Status: DISCONTINUED | OUTPATIENT
Start: 2024-07-01 | End: 2024-07-01

## 2024-07-01 RX ORDER — POLYETHYLENE GLYCOL 3350 1 G/G
17 POWDER ORAL DAILY
Refills: 0 | Status: DISCONTINUED | OUTPATIENT
Start: 2024-07-02 | End: 2024-07-03

## 2024-07-01 RX ORDER — DOXAZOSIN MESYLATE 2 MG/1
1 TABLET ORAL AT BEDTIME
Refills: 0 | Status: DISCONTINUED | OUTPATIENT
Start: 2024-07-01 | End: 2024-07-03

## 2024-07-01 RX ORDER — OXYMETAZOLINE HYDROCHLORIDE 0.05 G/100ML
2 SPRAY NASAL
Refills: 0 | Status: DISCONTINUED | OUTPATIENT
Start: 2024-07-01 | End: 2024-07-01

## 2024-07-01 RX ORDER — ACETAMINOPHEN 325 MG
650 TABLET ORAL EVERY 6 HOURS
Refills: 0 | Status: DISCONTINUED | OUTPATIENT
Start: 2024-07-01 | End: 2024-07-03

## 2024-07-01 RX ADMIN — Medication 1 SPRAY(S): at 06:00

## 2024-07-01 RX ADMIN — Medication 1 SPRAY(S): at 00:03

## 2024-07-01 RX ADMIN — DEXTROSE MONOHYDRATE AND SODIUM CHLORIDE 500 MILLILITER(S): 5; .3 INJECTION, SOLUTION INTRAVENOUS at 06:00

## 2024-07-01 RX ADMIN — Medication 650 MILLIGRAM(S): at 00:02

## 2024-07-01 RX ADMIN — DOXAZOSIN MESYLATE 1 MILLIGRAM(S): 2 TABLET ORAL at 21:30

## 2024-07-01 RX ADMIN — Medication 15 MILLILITER(S): at 05:59

## 2024-07-01 RX ADMIN — Medication 20 MICROGRAM(S): at 06:13

## 2024-07-01 RX ADMIN — Medication 15 MILLILITER(S): at 17:16

## 2024-07-01 RX ADMIN — Medication 650 MILLIGRAM(S): at 00:32

## 2024-07-01 RX ADMIN — Medication 650 MILLIGRAM(S): at 18:20

## 2024-07-01 RX ADMIN — Medication 650 MILLIGRAM(S): at 19:20

## 2024-07-01 RX ADMIN — Medication 20 MILLIGRAM(S): at 11:20

## 2024-07-02 ENCOUNTER — TRANSCRIPTION ENCOUNTER (OUTPATIENT)
Age: 84
End: 2024-07-02

## 2024-07-02 LAB
ANION GAP SERPL CALC-SCNC: 5 MMOL/L — SIGNIFICANT CHANGE UP (ref 5–17)
BACTERIA # UR AUTO: NEGATIVE /HPF — SIGNIFICANT CHANGE UP
BUN SERPL-MCNC: 24 MG/DL — HIGH (ref 7–23)
CALCIUM SERPL-MCNC: 8.6 MG/DL — SIGNIFICANT CHANGE UP (ref 8.4–10.5)
CHLORIDE SERPL-SCNC: 98 MMOL/L — SIGNIFICANT CHANGE UP (ref 96–108)
CO2 SERPL-SCNC: 33 MMOL/L — HIGH (ref 22–31)
CREAT SERPL-MCNC: 0.55 MG/DL — SIGNIFICANT CHANGE UP (ref 0.5–1.3)
EGFR: 98 ML/MIN/1.73M2 — SIGNIFICANT CHANGE UP
GLUCOSE SERPL-MCNC: 132 MG/DL — HIGH (ref 70–99)
HCT VFR BLD CALC: 23.7 % — LOW (ref 39–50)
HGB BLD-MCNC: 7.6 G/DL — LOW (ref 13–17)
MAGNESIUM SERPL-MCNC: 2.1 MG/DL — SIGNIFICANT CHANGE UP (ref 1.6–2.6)
MCHC RBC-ENTMCNC: 29.3 PG — SIGNIFICANT CHANGE UP (ref 27–34)
MCHC RBC-ENTMCNC: 32.1 GM/DL — SIGNIFICANT CHANGE UP (ref 32–36)
MCV RBC AUTO: 91.5 FL — SIGNIFICANT CHANGE UP (ref 80–100)
NRBC # BLD: 0 /100 WBCS — SIGNIFICANT CHANGE UP (ref 0–0)
PHOSPHATE SERPL-MCNC: 3.1 MG/DL — SIGNIFICANT CHANGE UP (ref 2.5–4.5)
PLATELET # BLD AUTO: 212 K/UL — SIGNIFICANT CHANGE UP (ref 150–400)
POTASSIUM SERPL-MCNC: 3.6 MMOL/L — SIGNIFICANT CHANGE UP (ref 3.5–5.3)
POTASSIUM SERPL-SCNC: 3.6 MMOL/L — SIGNIFICANT CHANGE UP (ref 3.5–5.3)
RBC # BLD: 2.59 M/UL — LOW (ref 4.2–5.8)
RBC # FLD: 12.9 % — SIGNIFICANT CHANGE UP (ref 10.3–14.5)
RBC CASTS # UR COMP ASSIST: 3 /HPF — SIGNIFICANT CHANGE UP (ref 0–4)
SODIUM SERPL-SCNC: 136 MMOL/L — SIGNIFICANT CHANGE UP (ref 135–145)
SQUAMOUS # UR AUTO: 1 /HPF — SIGNIFICANT CHANGE UP (ref 0–5)
WBC # BLD: 6.12 K/UL — SIGNIFICANT CHANGE UP (ref 3.8–10.5)
WBC # FLD AUTO: 6.12 K/UL — SIGNIFICANT CHANGE UP (ref 3.8–10.5)
WBC UR QL: 1 /HPF — SIGNIFICANT CHANGE UP (ref 0–5)

## 2024-07-02 PROCEDURE — 74230 X-RAY XM SWLNG FUNCJ C+: CPT | Mod: 26

## 2024-07-02 RX ORDER — POTASSIUM CHLORIDE 600 MG/1
40 TABLET, FILM COATED, EXTENDED RELEASE ORAL ONCE
Refills: 0 | Status: COMPLETED | OUTPATIENT
Start: 2024-07-02 | End: 2024-07-02

## 2024-07-02 RX ADMIN — Medication 15 MILLILITER(S): at 06:03

## 2024-07-02 RX ADMIN — POLYETHYLENE GLYCOL 3350 17 GRAM(S): 1 POWDER ORAL at 11:15

## 2024-07-02 RX ADMIN — Medication 10 MILLILITER(S): at 07:18

## 2024-07-02 RX ADMIN — Medication 15 MILLILITER(S): at 17:18

## 2024-07-02 RX ADMIN — Medication 20 MILLIGRAM(S): at 11:15

## 2024-07-02 RX ADMIN — Medication 20 MICROGRAM(S): at 07:17

## 2024-07-02 RX ADMIN — POTASSIUM CHLORIDE 40 MILLIEQUIVALENT(S): 600 TABLET, FILM COATED, EXTENDED RELEASE ORAL at 08:01

## 2024-07-03 ENCOUNTER — TRANSCRIPTION ENCOUNTER (OUTPATIENT)
Age: 84
End: 2024-07-03

## 2024-07-03 VITALS — TEMPERATURE: 98 F

## 2024-07-03 LAB
ANION GAP SERPL CALC-SCNC: 6 MMOL/L — SIGNIFICANT CHANGE UP (ref 5–17)
BUN SERPL-MCNC: 21 MG/DL — SIGNIFICANT CHANGE UP (ref 7–23)
CALCIUM SERPL-MCNC: 8.8 MG/DL — SIGNIFICANT CHANGE UP (ref 8.4–10.5)
CHLORIDE SERPL-SCNC: 100 MMOL/L — SIGNIFICANT CHANGE UP (ref 96–108)
CO2 SERPL-SCNC: 29 MMOL/L — SIGNIFICANT CHANGE UP (ref 22–31)
CREAT SERPL-MCNC: 0.55 MG/DL — SIGNIFICANT CHANGE UP (ref 0.5–1.3)
EGFR: 98 ML/MIN/1.73M2 — SIGNIFICANT CHANGE UP
GLUCOSE SERPL-MCNC: 104 MG/DL — HIGH (ref 70–99)
HCT VFR BLD CALC: 24.7 % — LOW (ref 39–50)
HGB BLD-MCNC: 7.5 G/DL — LOW (ref 13–17)
MAGNESIUM SERPL-MCNC: 2 MG/DL — SIGNIFICANT CHANGE UP (ref 1.6–2.6)
MCHC RBC-ENTMCNC: 28.6 PG — SIGNIFICANT CHANGE UP (ref 27–34)
MCHC RBC-ENTMCNC: 30.4 GM/DL — LOW (ref 32–36)
MCV RBC AUTO: 94.3 FL — SIGNIFICANT CHANGE UP (ref 80–100)
NRBC # BLD: 0 /100 WBCS — SIGNIFICANT CHANGE UP (ref 0–0)
PHOSPHATE SERPL-MCNC: 3.9 MG/DL — SIGNIFICANT CHANGE UP (ref 2.5–4.5)
PLATELET # BLD AUTO: 267 K/UL — SIGNIFICANT CHANGE UP (ref 150–400)
POTASSIUM SERPL-MCNC: 4.4 MMOL/L — SIGNIFICANT CHANGE UP (ref 3.5–5.3)
POTASSIUM SERPL-SCNC: 4.4 MMOL/L — SIGNIFICANT CHANGE UP (ref 3.5–5.3)
RBC # BLD: 2.62 M/UL — LOW (ref 4.2–5.8)
RBC # FLD: 12.9 % — SIGNIFICANT CHANGE UP (ref 10.3–14.5)
SODIUM SERPL-SCNC: 135 MMOL/L — SIGNIFICANT CHANGE UP (ref 135–145)
WBC # BLD: 6.09 K/UL — SIGNIFICANT CHANGE UP (ref 3.8–10.5)
WBC # FLD AUTO: 6.09 K/UL — SIGNIFICANT CHANGE UP (ref 3.8–10.5)

## 2024-07-03 PROCEDURE — 97116 GAIT TRAINING THERAPY: CPT

## 2024-07-03 PROCEDURE — C9399: CPT

## 2024-07-03 PROCEDURE — 86900 BLOOD TYPING SEROLOGIC ABO: CPT

## 2024-07-03 PROCEDURE — 84132 ASSAY OF SERUM POTASSIUM: CPT

## 2024-07-03 PROCEDURE — 83036 HEMOGLOBIN GLYCOSYLATED A1C: CPT

## 2024-07-03 PROCEDURE — C1889: CPT

## 2024-07-03 PROCEDURE — 85610 PROTHROMBIN TIME: CPT

## 2024-07-03 PROCEDURE — 82947 ASSAY GLUCOSE BLOOD QUANT: CPT

## 2024-07-03 PROCEDURE — 92611 MOTION FLUOROSCOPY/SWALLOW: CPT

## 2024-07-03 PROCEDURE — 86901 BLOOD TYPING SEROLOGIC RH(D): CPT

## 2024-07-03 PROCEDURE — 84295 ASSAY OF SERUM SODIUM: CPT

## 2024-07-03 PROCEDURE — 97110 THERAPEUTIC EXERCISES: CPT

## 2024-07-03 PROCEDURE — 97530 THERAPEUTIC ACTIVITIES: CPT

## 2024-07-03 PROCEDURE — 86850 RBC ANTIBODY SCREEN: CPT

## 2024-07-03 PROCEDURE — 84100 ASSAY OF PHOSPHORUS: CPT

## 2024-07-03 PROCEDURE — 87040 BLOOD CULTURE FOR BACTERIA: CPT

## 2024-07-03 PROCEDURE — 97161 PT EVAL LOW COMPLEX 20 MIN: CPT

## 2024-07-03 PROCEDURE — 85027 COMPLETE CBC AUTOMATED: CPT

## 2024-07-03 PROCEDURE — 82962 GLUCOSE BLOOD TEST: CPT

## 2024-07-03 PROCEDURE — 80048 BASIC METABOLIC PNL TOTAL CA: CPT

## 2024-07-03 PROCEDURE — L8699: CPT

## 2024-07-03 PROCEDURE — 83735 ASSAY OF MAGNESIUM: CPT

## 2024-07-03 PROCEDURE — 36415 COLL VENOUS BLD VENIPUNCTURE: CPT

## 2024-07-03 PROCEDURE — 92610 EVALUATE SWALLOWING FUNCTION: CPT

## 2024-07-03 PROCEDURE — 0225U NFCT DS DNA&RNA 21 SARSCOV2: CPT

## 2024-07-03 PROCEDURE — 74230 X-RAY XM SWLNG FUNCJ C+: CPT

## 2024-07-03 PROCEDURE — 83605 ASSAY OF LACTIC ACID: CPT

## 2024-07-03 PROCEDURE — 82330 ASSAY OF CALCIUM: CPT

## 2024-07-03 PROCEDURE — 92526 ORAL FUNCTION THERAPY: CPT

## 2024-07-03 PROCEDURE — 84443 ASSAY THYROID STIM HORMONE: CPT

## 2024-07-03 PROCEDURE — 97165 OT EVAL LOW COMPLEX 30 MIN: CPT

## 2024-07-03 PROCEDURE — 82805 BLOOD GASES W/O2 SATURATION: CPT

## 2024-07-03 PROCEDURE — 85730 THROMBOPLASTIN TIME PARTIAL: CPT

## 2024-07-03 PROCEDURE — 71045 X-RAY EXAM CHEST 1 VIEW: CPT

## 2024-07-03 PROCEDURE — 81001 URINALYSIS AUTO W/SCOPE: CPT

## 2024-07-03 PROCEDURE — 82803 BLOOD GASES ANY COMBINATION: CPT

## 2024-07-03 RX ORDER — ACETAMINOPHEN 325 MG
20.31 TABLET ORAL
Qty: 0 | Refills: 0 | DISCHARGE
Start: 2024-07-03

## 2024-07-03 RX ORDER — SENNOSIDES 8.6 MG
10 TABLET ORAL
Qty: 0 | Refills: 0 | DISCHARGE
Start: 2024-07-03

## 2024-07-03 RX ORDER — POLYETHYLENE GLYCOL 3350 1 G/G
17 POWDER ORAL
Qty: 0 | Refills: 0 | DISCHARGE
Start: 2024-07-03

## 2024-07-03 RX ADMIN — Medication 15 MILLILITER(S): at 05:29

## 2024-07-03 RX ADMIN — Medication 12.5 MILLIGRAM(S): at 05:29

## 2024-07-03 RX ADMIN — Medication 30 MILLILITER(S): at 09:17

## 2024-07-03 RX ADMIN — Medication 20 MICROGRAM(S): at 06:57

## 2024-07-03 RX ADMIN — POLYETHYLENE GLYCOL 3350 17 GRAM(S): 1 POWDER ORAL at 06:57

## 2024-07-07 LAB
CULTURE RESULTS: SIGNIFICANT CHANGE UP
CULTURE RESULTS: SIGNIFICANT CHANGE UP
SPECIMEN SOURCE: SIGNIFICANT CHANGE UP
SPECIMEN SOURCE: SIGNIFICANT CHANGE UP

## 2024-07-11 LAB — SURGICAL PATHOLOGY STUDY: SIGNIFICANT CHANGE UP

## 2024-07-16 ENCOUNTER — APPOINTMENT (OUTPATIENT)
Dept: HEART AND VASCULAR | Facility: CLINIC | Age: 84
End: 2024-07-16
Payer: MEDICARE

## 2024-07-16 ENCOUNTER — APPOINTMENT (OUTPATIENT)
Dept: HEMATOLOGY ONCOLOGY | Facility: CLINIC | Age: 84
End: 2024-07-16
Payer: MEDICARE

## 2024-07-16 VITALS
BODY MASS INDEX: 17.32 KG/M2 | WEIGHT: 121 LBS | OXYGEN SATURATION: 97 % | DIASTOLIC BLOOD PRESSURE: 68 MMHG | RESPIRATION RATE: 16 BRPM | HEIGHT: 70 IN | SYSTOLIC BLOOD PRESSURE: 115 MMHG | TEMPERATURE: 98.5 F | HEART RATE: 83 BPM

## 2024-07-16 VITALS
HEART RATE: 73 BPM | SYSTOLIC BLOOD PRESSURE: 144 MMHG | TEMPERATURE: 97.7 F | DIASTOLIC BLOOD PRESSURE: 76 MMHG | HEIGHT: 70 IN | WEIGHT: 122 LBS | BODY MASS INDEX: 17.47 KG/M2

## 2024-07-16 VITALS — SYSTOLIC BLOOD PRESSURE: 146 MMHG | DIASTOLIC BLOOD PRESSURE: 72 MMHG

## 2024-07-16 DIAGNOSIS — I10 ESSENTIAL (PRIMARY) HYPERTENSION: ICD-10-CM

## 2024-07-16 DIAGNOSIS — R00.2 PALPITATIONS: ICD-10-CM

## 2024-07-16 DIAGNOSIS — R91.1 SOLITARY PULMONARY NODULE: ICD-10-CM

## 2024-07-16 DIAGNOSIS — I35.8 OTHER NONRHEUMATIC AORTIC VALVE DISORDERS: ICD-10-CM

## 2024-07-16 DIAGNOSIS — C34.92 MALIGNANT NEOPLASM OF UNSPECIFIED PART OF LEFT BRONCHUS OR LUNG: ICD-10-CM

## 2024-07-16 DIAGNOSIS — R91.8 OTHER NONSPECIFIC ABNORMAL FINDING OF LUNG FIELD: ICD-10-CM

## 2024-07-16 DIAGNOSIS — I47.10 SUPRAVENTRICULAR TACHYCARDIA, UNSPECIFIED: ICD-10-CM

## 2024-07-16 DIAGNOSIS — Z86.79 PERSONAL HISTORY OF OTHER DISEASES OF THE CIRCULATORY SYSTEM: ICD-10-CM

## 2024-07-16 DIAGNOSIS — I48.0 PAROXYSMAL ATRIAL FIBRILLATION: ICD-10-CM

## 2024-07-16 DIAGNOSIS — R94.31 ABNORMAL ELECTROCARDIOGRAM [ECG] [EKG]: ICD-10-CM

## 2024-07-16 DIAGNOSIS — E03.9 HYPOTHYROIDISM, UNSPECIFIED: ICD-10-CM

## 2024-07-16 DIAGNOSIS — C61 MALIGNANT NEOPLASM OF PROSTATE: ICD-10-CM

## 2024-07-16 DIAGNOSIS — Z11.59 ENCOUNTER FOR SCREENING FOR OTHER VIRAL DISEASES: ICD-10-CM

## 2024-07-16 DIAGNOSIS — N40.0 BENIGN PROSTATIC HYPERPLASIA WITHOUT LOWER URINARY TRACT SYMPMS: ICD-10-CM

## 2024-07-16 DIAGNOSIS — Z01.818 ENCOUNTER FOR OTHER PREPROCEDURAL EXAMINATION: ICD-10-CM

## 2024-07-16 DIAGNOSIS — R60.0 LOCALIZED EDEMA: ICD-10-CM

## 2024-07-16 PROBLEM — J34.89 NASAL DISCHARGE: Status: ACTIVE | Noted: 2024-07-16

## 2024-07-16 PROBLEM — I25.10 ATHEROSCLEROTIC HEART DISEASE OF NATIVE CORONARY ARTERY WITHOUT ANGINA PECTORIS: Chronic | Status: ACTIVE | Noted: 2024-06-25

## 2024-07-16 PROCEDURE — 99214 OFFICE O/P EST MOD 30 MIN: CPT | Mod: 25

## 2024-07-16 PROCEDURE — 99214 OFFICE O/P EST MOD 30 MIN: CPT

## 2024-07-16 PROCEDURE — 93000 ELECTROCARDIOGRAM COMPLETE: CPT

## 2024-07-22 ENCOUNTER — NON-APPOINTMENT (OUTPATIENT)
Age: 84
End: 2024-07-22

## 2024-07-24 ENCOUNTER — APPOINTMENT (OUTPATIENT)
Dept: OTOLARYNGOLOGY | Facility: CLINIC | Age: 84
End: 2024-07-24
Payer: MEDICARE

## 2024-07-24 ENCOUNTER — NON-APPOINTMENT (OUTPATIENT)
Age: 84
End: 2024-07-24

## 2024-07-24 VITALS
SYSTOLIC BLOOD PRESSURE: 131 MMHG | WEIGHT: 118 LBS | HEART RATE: 97 BPM | HEIGHT: 70 IN | BODY MASS INDEX: 16.89 KG/M2 | DIASTOLIC BLOOD PRESSURE: 64 MMHG | OXYGEN SATURATION: 99 % | TEMPERATURE: 98.1 F

## 2024-07-24 DIAGNOSIS — J34.89 OTHER SPECIFIED DISORDERS OF NOSE AND NASAL SINUSES: ICD-10-CM

## 2024-07-24 DIAGNOSIS — C05.0 MALIGNANT NEOPLASM OF HARD PALATE: ICD-10-CM

## 2024-07-24 PROCEDURE — 31231 NASAL ENDOSCOPY DX: CPT | Mod: 78

## 2024-07-24 PROCEDURE — 99214 OFFICE O/P EST MOD 30 MIN: CPT | Mod: 25

## 2024-07-25 ENCOUNTER — NON-APPOINTMENT (OUTPATIENT)
Age: 84
End: 2024-07-25

## 2024-07-26 ENCOUNTER — APPOINTMENT (OUTPATIENT)
Dept: INFUSION THERAPY | Facility: CLINIC | Age: 84
End: 2024-07-26

## 2024-07-26 RX ORDER — AMOXICILLIN AND CLAVULANATE POTASSIUM 875; 125 MG/1; MG/1
875-125 TABLET, COATED ORAL
Qty: 20 | Refills: 0 | Status: ACTIVE | COMMUNITY
Start: 2024-07-26 | End: 1900-01-01

## 2024-07-26 RX ORDER — FLUTICASONE PROPIONATE 50 UG/1
50 SPRAY, METERED NASAL TWICE DAILY
Qty: 1 | Refills: 2 | Status: ACTIVE | COMMUNITY
Start: 2024-07-26 | End: 1900-01-01

## 2024-07-27 NOTE — HISTORY OF PRESENT ILLNESS
[de-identified] : CC: clear rhinorrhea   HISTORY OF PRESENT ILLNESS: Mr. Franklin is a pleasant 83 year old gentleman with clear rhinorrhea referred by Dr. Alvarez from [facility] hx of oral cavity, hypopharyngeal SCC s/p resection, XRT, and now with recurrent right maxilla SCC s/p salvage and right ALT FF developed clear right sided rhinorrhea after the procedure denies headache or vision changes but at night it's particularly discomforting because it becomes PND and he can't sleep denies fever Dr. Alvarez collected the fluid and it's negative for B2F   REVIEW OF SYSTEMS: General ROS: negative for - chills, fatigue or fever Psychological ROS: negative for - anxiety or depression Ophthalmic ROS: negative for - blurry vision, decreased vision or double vision ENT ROS: negative except as noted from HPI Allergy and Immunology ROS: negative except as noted from HPI Hematological and Lymphatic ROS: negative for - bleeding problems Endocrine ROS: negative for - malaise/lethargy Respiratory ROS: negative for - stridor Cardiovascular ROS: negative for - chest pain Gastrointestinal ROS: negative for - appetite loss or nausea/vomiting Genitourinary ROS: negative for - incontinence Musculoskeletal ROS: negative for - gait disturbance Neurological ROS: negative for - behavioral changes Dermatological ROS: negative for - nail changes   Physical Exam:   GENERAL APPEARANCE: Well-developed and No Acute Distress. COMMUNICATION: Able to Communicate. Strong Voice.   HEAD AND FACE Eyes: Testing of ocular motility including primary gaze alignment normal. Inspection and Appearance: No evidence of lesions or masses Palpation: Palpation of the face reveals no sinus tenderness Salivary Glands: Symmetric without masses Facial Strength: Symmetric without evidence of facial paralysis   EAR, NOSE, MOUTH, and THROAT: Ear Canals and Tympanic Membranes, Bilateral: No evidence of inflammation or lesions. Thresholds: Clinical speech reception thresholds normal. External, Nose and Auricle: No lesions or masses.   NECK: Evaluation: No evidence of masses or crepitus. The neck is symmetric and the trachea is in the midline position. Thyroid: No evidence of enlargement, tenderness or mass. Neck Lymph Nodes: WNL. Respiratory: Inspection of the chest including symmetry, expansion and/or assessment of respiratory effort normal. Cardiovascular: Evaluation of peripheral vascular system by observation and palpation of capillary refill, normal. Neurological/Psychiatric: Alert, Oriented, Mood, and Affect Normal.   PROCEDURE: Nasal endoscopy (03402)   SURGEON: Sukhi Elkins MD   Prior to the procedure, I had a discussion with the patient regarding the risks, benefits, and alternatives of the procedure and a verbal consent was obtained.   After obtaining adequate decongestion of the nasal mucosa with topical Epinephrine and adequate anesthesia with topical Lidocaine nasal spray, the nasal endoscope was used to examine the nasal passages and paranasal sinuses. The endoscope was passed along the floor of the nose bilaterally to evaluate the inferior meatus, floor of the nose, inferior turbinate, and nasopharynx. The scope was then passed superiorly to evaluate the area of the sphenoethmoidal recess, superior turbinate and superior meatus. As the scope was withdrawn anteriorly, the middle turbinate and middle meatus were carefully inspected. The endoscope was withdrawn and the patient tolerated the procedure well. No complications were encountered.   INSTRUMENTS: rigid 45   EXAM FINDINGS: septum relatively midline. some clear fluid in the right inferior meatus, suctioned out. some granulation tissue along the posterior nasal floor. no significant evidence of prior sinus surgery or skull base disruption. no fluid with valsalva.   IMPRESSION: Mr. Franklin is a pleasant 83 year old gentleman with history of recurrent SCC of right palate s/p infrastructural maxillectomy, ALT FF with new onset right rhinorrhea   PLAN: -unable to clearly visualize source of the rhinorrhea but I also witnessed it during the exam -we will trial topical nasal sprays and abx -follow up in a week, low threshold for imaging -discussed case with Dr. Kim Elkins MD [facility] Rhinology and Skull Base Surgery Department of Otolaryngology- Head and Neck Surgery [facility]

## 2024-07-29 RX ORDER — AMOXICILLIN AND CLAVULANATE POTASSIUM 400; 57 MG/5ML; MG/5ML
400-57 POWDER, FOR SUSPENSION ORAL
Qty: 2 | Refills: 0 | Status: ACTIVE | COMMUNITY
Start: 2024-07-29 | End: 1900-01-01

## 2024-08-02 ENCOUNTER — APPOINTMENT (OUTPATIENT)
Dept: OTOLARYNGOLOGY | Facility: CLINIC | Age: 84
End: 2024-08-02

## 2024-08-02 DIAGNOSIS — C05.0 MALIGNANT NEOPLASM OF HARD PALATE: ICD-10-CM

## 2024-08-02 PROCEDURE — 99442: CPT | Mod: 93

## 2024-08-10 NOTE — HISTORY OF PRESENT ILLNESS
[de-identified] : CC: clear rhinorrhea   HISTORY OF PRESENT ILLNESS: Mr. Franklin is a pleasant 83 year old gentleman with clear rhinorrhea referred by Dr. Alvarez from [facility] hx of oral cavity, hypopharyngeal SCC s/p resection, XRT, and now with recurrent right maxilla SCC s/p salvage and right ALT FF developed clear right sided rhinorrhea after the procedure denies headache or vision changes but at night it's particularly discomforting because it becomes PND and he can't sleep denies fever Dr. Alvarez collected the fluid and it's negative for B2F   s/p flonase and augmentin liquid tolerated 3 days before having GI issues drainage has significantly reduced per patient  REVIEW OF SYSTEMS: General ROS: negative for - chills, fatigue or fever Psychological ROS: negative for - anxiety or depression Ophthalmic ROS: negative for - blurry vision, decreased vision or double vision ENT ROS: negative except as noted from HPI Allergy and Immunology ROS: negative except as noted from HPI Hematological and Lymphatic ROS: negative for - bleeding problems Endocrine ROS: negative for - malaise/lethargy Respiratory ROS: negative for - stridor Cardiovascular ROS: negative for - chest pain Gastrointestinal ROS: negative for - appetite loss or nausea/vomiting Genitourinary ROS: negative for - incontinence Musculoskeletal ROS: negative for - gait disturbance Neurological ROS: negative for - behavioral changes Dermatological ROS: negative for - nail changes  IMPRESSION: Mr. Franklin is a pleasant 83 year old gentleman with history of recurrent SCC of right palate s/p infrastructural maxillectomy, ALT FF with new onset right rhinorrhea   PLAN: -continue flonase for now -informed Dr. Alvarez -if drainage recurs we will consider another abx vs. imaging   Sukhi Elkins MD [facility] Rhinology and Skull Base Surgery Department of Otolaryngology- Head and Neck Surgery [facility]

## 2024-08-13 ENCOUNTER — APPOINTMENT (OUTPATIENT)
Dept: OTOLARYNGOLOGY | Facility: CLINIC | Age: 84
End: 2024-08-13
Payer: MEDICARE

## 2024-08-13 VITALS
RESPIRATION RATE: 16 BRPM | TEMPERATURE: 98.7 F | OXYGEN SATURATION: 9 % | DIASTOLIC BLOOD PRESSURE: 73 MMHG | HEART RATE: 73 BPM | SYSTOLIC BLOOD PRESSURE: 134 MMHG

## 2024-08-13 PROCEDURE — 99214 OFFICE O/P EST MOD 30 MIN: CPT | Mod: 24

## 2024-08-13 NOTE — HISTORY OF PRESENT ILLNESS
[de-identified] : Patient is an 83 year old male with a significant history of oral cavity, hypopharyngeal SCC post multiple surgical procedures, radiation, aspiration, malnutrition with dysphagia. Recently had a right molar extraction with biopsy revealing recurrent right maxilla SCC [id number]. PET/CT ordered. Dr. Alvarez sent to me for evaluation for salvage surgery reconstruction of palate. The patient has no pain in the area currently. Overall stable from a medical standpoint.  [FreeTextEntry1] : 8/14/24: Patient here today for follow up s/p resection of palate tumor with ALT reconstruction. He is doing well. Currently getting nutrition through PEG although started to supplement with oral feeds over this past week. Leg donor site is doing well and not bothering him. He does report some dribbling of liquids from right side of mouth if takes too large of a sip. Otherwise is able to swallow well without any issues. He notices that his PEG tube will sometimes not flush and that his feeds will sometimes not flow easily, will set up consult with GI to determine if there are any issues or blockages there.

## 2024-08-13 NOTE — HISTORY OF PRESENT ILLNESS
[de-identified] : Patient is an 83 year old male with a significant history of oral cavity, hypopharyngeal SCC post multiple surgical procedures, radiation, aspiration, malnutrition with dysphagia. Recently had a right molar extraction with biopsy revealing recurrent right maxilla SCC [id number]. PET/CT ordered. Dr. Alvarez sent to me for evaluation for salvage surgery reconstruction of palate. The patient has no pain in the area currently. Overall stable from a medical standpoint.  [FreeTextEntry1] : 8/14/24: Patient here today for follow up s/p resection of palate tumor with ALT reconstruction. He is doing well. Currently getting nutrition through PEG although started to supplement with oral feeds over this past week. Leg donor site is doing well and not bothering him. He does report some dribbling of liquids from right side of mouth if takes too large of a sip. Otherwise is able to swallow well without any issues. He notices that his PEG tube will sometimes not flush and that his feeds will sometimes not flow easily, will set up consult with GI to determine if there are any issues or blockages there.

## 2024-08-13 NOTE — PHYSICAL EXAM
[de-identified] : right neck insion within normal limits, no abnormal swelling or masses palpated [de-identified] : ALT free flap in place with appropriate color and texture, no signs of dehiscence or break down, no sign of oronasal fistula [Normal] : no rashes [FreeTextEntry2] : Post surgery and radiation [de-identified] : No new masses

## 2024-08-13 NOTE — PHYSICAL EXAM
[de-identified] : right neck insion within normal limits, no abnormal swelling or masses palpated [de-identified] : ALT free flap in place with appropriate color and texture, no signs of dehiscence or break down, no sign of oronasal fistula [Normal] : no rashes [FreeTextEntry2] : Post surgery and radiation [de-identified] : No new masses

## 2024-08-21 ENCOUNTER — APPOINTMENT (OUTPATIENT)
Dept: OTOLARYNGOLOGY | Facility: CLINIC | Age: 84
End: 2024-08-21
Payer: MEDICARE

## 2024-08-21 ENCOUNTER — APPOINTMENT (OUTPATIENT)
Dept: SURGERY | Facility: CLINIC | Age: 84
End: 2024-08-21

## 2024-08-21 DIAGNOSIS — J34.89 OTHER SPECIFIED DISORDERS OF NOSE AND NASAL SINUSES: ICD-10-CM

## 2024-08-21 DIAGNOSIS — C05.0 MALIGNANT NEOPLASM OF HARD PALATE: ICD-10-CM

## 2024-08-21 PROCEDURE — 99441: CPT | Mod: 93

## 2024-08-21 NOTE — HISTORY OF PRESENT ILLNESS
[de-identified] : CC: clear rhinorrhea   HISTORY OF PRESENT ILLNESS: Mr. Franklin is a pleasant 83 year old gentleman with clear rhinorrhea referred by Dr. Alvarez from [facility] hx of oral cavity, hypopharyngeal SCC s/p resection, XRT, and now with recurrent right maxilla SCC s/p salvage and right ALT FF developed clear right sided rhinorrhea after the procedure denies headache or vision changes but at night it's particularly discomforting because it becomes PND and he can't sleep denies fever Dr. Alvarez collected the fluid and it's negative for B2F   s/p flonase and augmentin liquid tolerated 3 days before having GI issues drainage has significantly reduced per patient  3 week follow up, spoke with patient, still using flonase no significant drainage  REVIEW OF SYSTEMS: General ROS: negative for - chills, fatigue or fever Psychological ROS: negative for - anxiety or depression Ophthalmic ROS: negative for - blurry vision, decreased vision or double vision ENT ROS: negative except as noted from HPI Allergy and Immunology ROS: negative except as noted from HPI Hematological and Lymphatic ROS: negative for - bleeding problems Endocrine ROS: negative for - malaise/lethargy Respiratory ROS: negative for - stridor Cardiovascular ROS: negative for - chest pain Gastrointestinal ROS: negative for - appetite loss or nausea/vomiting Genitourinary ROS: negative for - incontinence Musculoskeletal ROS: negative for - gait disturbance Neurological ROS: negative for - behavioral changes Dermatological ROS: negative for - nail changes  IMPRESSION: Mr. Franklin is a pleasant 83 year old gentleman with history of recurrent SCC of right palate s/p infrastructural maxillectomy, ALT FF with new onset right rhinorrhea   PLAN: -will stop flonase now, restart if drainage returns -follow up with Dr. Yin and Dr. Kim Elkins MD [facility] Rhinology and Skull Base Surgery Department of Otolaryngology- Head and Neck Surgery [facility]

## 2024-08-22 ENCOUNTER — NON-APPOINTMENT (OUTPATIENT)
Age: 84
End: 2024-08-22

## 2024-08-27 ENCOUNTER — APPOINTMENT (OUTPATIENT)
Dept: HEMATOLOGY ONCOLOGY | Facility: CLINIC | Age: 84
End: 2024-08-27
Payer: MEDICARE

## 2024-08-27 ENCOUNTER — APPOINTMENT (OUTPATIENT)
Dept: SURGERY | Facility: CLINIC | Age: 84
End: 2024-08-27

## 2024-08-27 VITALS
BODY MASS INDEX: 17.61 KG/M2 | OXYGEN SATURATION: 98 % | SYSTOLIC BLOOD PRESSURE: 120 MMHG | RESPIRATION RATE: 16 BRPM | TEMPERATURE: 97.9 F | HEART RATE: 87 BPM | HEIGHT: 70 IN | DIASTOLIC BLOOD PRESSURE: 75 MMHG | WEIGHT: 123 LBS

## 2024-08-27 VITALS
DIASTOLIC BLOOD PRESSURE: 76 MMHG | OXYGEN SATURATION: 98 % | SYSTOLIC BLOOD PRESSURE: 148 MMHG | HEIGHT: 70 IN | WEIGHT: 124 LBS | BODY MASS INDEX: 17.75 KG/M2 | HEART RATE: 74 BPM | TEMPERATURE: 97.9 F

## 2024-08-27 DIAGNOSIS — C61 MALIGNANT NEOPLASM OF PROSTATE: ICD-10-CM

## 2024-08-27 PROCEDURE — 99213 OFFICE O/P EST LOW 20 MIN: CPT

## 2024-08-27 RX ORDER — BICALUTAMIDE 50 MG/1
50 TABLET ORAL
Refills: 0 | Status: ACTIVE | COMMUNITY

## 2024-08-27 NOTE — PHYSICAL EXAM
[Ambulatory and capable of all self care but unable to carry out any work activities] : Status 2- Ambulatory and capable of all self care but unable to carry out any work activities. Up and about more than 50% of waking hours [Thin] : thin [Normal] : affect appropriate [de-identified] : supple.  [de-identified] : normal RR, breathing pattern [de-identified] : normal HR [de-identified] : not distended.  [de-identified] : AAO x 3.  mild dysarthria.

## 2024-08-27 NOTE — ASSESSMENT
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 He is here for follow up of prostate cancer, which was diagnosed in 6/2021. high risk, GG5, [place] (4+5), [id number]. PSA = 12 at diagnosis. ADT started 10/2021.  RT to the prostate completed in 6/2022.   Completed 2 years of ADT.  PSA nadired at 0.2, then was noted to be mildly elevated on labs in Spring 2024.  PSMA PET scan 6/10/24 showed PSMA avid prominent pre-sacral and tao-rectal nodes suspicious for metastatic prostate cancer.  Resumed ADT 8/14/24 with urologist for recurrence hormone sensitive prostate cancer.  Diagnosed with squamous cell carcinoma of the palate after suspicious tissue was seen in the socket after R upper molar extraction in early 2024.  He is s/p maxillectomy + neck exploration + reconstruction on 6/26/24. Pathology showed moderately differentiated squamous cell carcinoma, 0.5 cm, margins negative, no LVI or PNI.  no nodes in the specimen.  No adjuvant therapy.  plan: 1. prostate cancer - recurrent, hormone sensitive disease in pelvic lymph nodes w/ rising PSA after definitive RT + 2 years of ADT. --pt met with radiation oncologist Dr Hand via telephone.  Was advised that due to # of lymph nodes close to the original treatment field, more radiation therapy not recommended.  He thus resumed ADT in mid August as was the recommendation made at the last office visit here.  He will receive ADT w/ his urologist, Dr Briscoe, due to ease of scheduling. --advised him to get PSA checked approx every 3 months.  needs to f/u here sooner than planned if PSA starts rising. --pt is a poor candidate for cytotoxic chemotherapy so would recommend hormonal therapies alone. --he is on bicalutamide now and may stop when outside the window for T flare, usually 30 days --would defer any secondary hormone therapies until time of progression.  2. squamous cell carcinoma of the palate --f/u with Dr Alvarez and Dr Yin --enter surveillance --Dr Yin has ordered neck CT for mid Autumn  3.  hx NSCLC --had imaging mid Summer no findings for lung cancer on prostate cancer PET scan --consider chest imaging in Spring with non-contrast chest CT.  4.  bone health --continue supplemental vit D --would get dexa in the future --he would be high risk for ONJ given his radiation history if bisphosphonates or RANKL inhibitors were used for loss of bone density, so if osteopenia or osteoporosis present would involve specialist rheumatologist or Endocrinologist for treatment.  pt will see urologist in November + have lupron and labs RTC here in Spring - sooner PRN.

## 2024-08-27 NOTE — HISTORY OF PRESENT ILLNESS
[de-identified] : Mr Franklin is an 83 year old male with a history of multiple cancers here for follow up of prostate cancer.  previously treated by Dr Grant for prostate cancer [facility] care w/ Dr. Wren 11/2022 after his departure Dr Arce following for laryngeal/lung ca  Oncologic history: 1. laryngeal cancer - chemoradiation 2016 --recurrence on palate - resected, followed by radiation 2018  2. NATALIIA squamous cell carcinoma - stage I --lobectomy 10/2020, no adjuvant therapy  3. prostate cancer --diagnosed 6/2021, PSA = 12 --[place]ni (4+5) - GG5. high risk. [id number] --consulted with Dr Velasquez, RT recommended --ADT with lupron + bicalutamide started 10/2021 by Dr Grant --completed RT to the prostate (Dr Gardner) 6/1/22 -- completed 2 years of ADT. --PSA slowly rising - rusty 0.2 in Autumn 2023, up to 0.5 in March 2024 (corresponding testosterone = 23) and 0.8 in April 2024. --PSMA PET on 6/10/24 (results only in [facility]IE) showed PSMA-avid prominent presacral and perirectal lymph nodes, SUV max 25.0, most likely representing metastatic prostate cancer.  Nonspecific, prominent mediastinal nodes, unchanged since June 2023, without uptake above background. Small loculated left pleural effusion and trace right pleural effusion. Small volume ascites. Diffuse bladder wall thickening, most likely chronic.  --restarted ADT with urologist Dr Briscoe on 8/14/24  4.  squamous cell carcinoma of palate  --pt underwent extraction of R upper molar.  Suspicious tissue noted in the socket and biopsied.  Pathology showed squamous cell carcinoma. --pre-op CT head/neck no metastases --Underwent Maxillectomy + neck exploration + reconstruction on 6/26/24 (Dr Yin and Dr Alvarez) with G-tube placement.  Pathology:  moderately differentiated squamous cell carcinoma, 0.5 cm, margins negative, no LVI or PNI.    [de-identified] : He presents to clinic for follow up with his wife. continues to make a good recovery from partial maxillectomy has gained a few lbs rhinorrhea resolved.  tested negative for B2 transferrin/negative for CSF. chronic fatigue. denies pain. frustrated by difficulty scheduling lupron w/ our office so he started this 8/14/24 with Dr Briscoe, urologist.  Also bicalutamide.

## 2024-08-27 NOTE — PHYSICAL EXAM
[Ambulatory and capable of all self care but unable to carry out any work activities] : Status 2- Ambulatory and capable of all self care but unable to carry out any work activities. Up and about more than 50% of waking hours [Thin] : thin [Normal] : affect appropriate [de-identified] : supple.  [de-identified] : normal RR, breathing pattern [de-identified] : normal HR [de-identified] : not distended.  [de-identified] : AAO x 3.  mild dysarthria.

## 2024-08-27 NOTE — HISTORY OF PRESENT ILLNESS
[de-identified] : Mr Franklin is an 83 year old male with a history of multiple cancers here for follow up of prostate cancer.  previously treated by Dr Grant for prostate cancer [facility] care w/ Dr. Wren 11/2022 after his departure Dr Arce following for laryngeal/lung ca  Oncologic history: 1. laryngeal cancer - chemoradiation 2016 --recurrence on palate - resected, followed by radiation 2018  2. NATALIIA squamous cell carcinoma - stage I --lobectomy 10/2020, no adjuvant therapy  3. prostate cancer --diagnosed 6/2021, PSA = 12 --Anni (4+5) - GG5. high risk. [id number] --consulted with Dr Velasquez, RT recommended --ADT with lupron + bicalutamide started 10/2021 by Dr Grant --completed RT to the prostate (Dr Gardner) 6/1/22 -- completed 2 years of ADT. --PSA slowly rising - rusty 0.2 in Autumn 2023, up to 0.5 in March 2024 (corresponding testosterone = 23) and 0.8 in April 2024. --PSMA PET on 6/10/24 (results only in HIE) showed PSMA-avid prominent presacral and perirectal lymph nodes, SUV max 25.0, most likely representing metastatic prostate cancer.  Nonspecific, prominent mediastinal nodes, unchanged since June 2023, without uptake above background. Small loculated left pleural effusion and trace right pleural effusion. Small volume ascites. Diffuse bladder wall thickening, most likely chronic.  --restarted ADT with urologist Dr Briscoe on 8/14/24  4.  squamous cell carcinoma of palate  --pt underwent extraction of R upper molar.  Suspicious tissue noted in the socket and biopsied.  Pathology showed squamous cell carcinoma. --pre-op CT head/neck no metastases --Underwent Maxillectomy + neck exploration + reconstruction on 6/26/24 (Dr Yin and Dr Alvarez) with G-tube placement.  Pathology:  moderately differentiated squamous cell carcinoma, 0.5 cm, margins negative, no LVI or PNI.    [de-identified] : He presents to clinic for follow up with his wife. continues to make a good recovery from partial maxillectomy has gained a few lbs rhinorrhea resolved.  tested negative for B2 transferrin/negative for CSF. chronic fatigue. denies pain. frustrated by difficulty scheduling lupron w/ our office so he started this 8/14/24 with Dr Briscoe, urologist.  Also bicalutamide.

## 2024-08-27 NOTE — ASSESSMENT
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 He is here for follow up of prostate cancer, which was diagnosed in 6/2021. high risk, GG5, [place] (4+5), [id number]. PSA = 12 at diagnosis. ADT started 10/2021.  RT to the prostate completed in 6/2022.   Completed 2 years of ADT.  PSA nadired at 0.2, then was noted to be mildly elevated on labs in Spring 2024.  PSMA PET scan 6/10/24 showed PSMA avid prominent pre-sacral and tao-rectal nodes suspicious for metastatic prostate cancer.  Resumed ADT 8/14/24 with urologist for recurrence hormone sensitive prostate cancer.  Diagnosed with squamous cell carcinoma of the palate after suspicious tissue was seen in the socket after R upper molar extraction in early 2024.  He is s/p maxillectomy + neck exploration + reconstruction on 6/26/24. Pathology showed moderately differentiated squamous cell carcinoma, 0.5 cm, margins negative, no LVI or PNI.  no nodes in the specimen.  No adjuvant therapy.  plan: 1. prostate cancer - recurrent, hormone sensitive disease in pelvic lymph nodes w/ rising PSA after definitive RT + 2 years of ADT. --pt met with radiation oncologist Dr Hand via telephone.  Was advised that due to # of lymph nodes close to the original treatment field, more radiation therapy not recommended.  He thus resumed ADT in mid August as was the recommendation made at the last office visit here.  He will receive ADT w/ his urologist, Dr Briscoe, due to ease of scheduling. --advised him to get PSA checked approx every 3 months.  needs to f/u here sooner than planned if PSA starts rising. --pt is a poor candidate for cytotoxic chemotherapy so would recommend hormonal therapies alone. --he is on bicalutamide now and may stop when outside the window for T flare, usually 30 days --would defer any secondary hormone therapies until time of progression.  2. squamous cell carcinoma of the palate --f/u with Dr Alvarez and Dr Yin --enter surveillance --Dr Yin has ordered neck CT for mid Autumn  3.  hx NSCLC --had imaging mid Summer no findings for lung cancer on prostate cancer PET scan --consider chest imaging in Spring with non-contrast chest CT.  4.  bone health --continue supplemental vit D --would get dexa in the future --he would be high risk for ONJ given his radiation history if bisphosphonates or RANKL inhibitors were used for loss of bone density, so if osteopenia or osteoporosis present would involve specialist rheumatologist or Endocrinologist for treatment.  pt will see urologist in November + have lupron and labs RTC here in Spring - sooner PRN.

## 2024-09-04 NOTE — HISTORY OF PRESENT ILLNESS
[de-identified] : Mr. Franklin and his wife present for evaluation of his PEG tube. He has been using it for regular feeds but is also able to gradually eat more by mouth,  He presents today for evaluation of a few concerns regarding his PEG tube.  He reports that there was some redness around the area with a little tenderness however that has improved greatly and nearly resolved at this point.  He denies any leakage around the tube.  He also reports that sometimes after he does the tube feeds he has a lot of burping and belching and fullness.  He has been doing roughly 2 cans of tube feed a day as he is now able to eat a lot more.  He denies any change in bowel habits.  He denies any vomiting.

## 2024-09-04 NOTE — HISTORY OF PRESENT ILLNESS
[de-identified] : Mr. Franklin and his wife present for evaluation of his PEG tube. He has been using it for regular feeds but is also able to gradually eat more by mouth,  He presents today for evaluation of a few concerns regarding his PEG tube.  He reports that there was some redness around the area with a little tenderness however that has improved greatly and nearly resolved at this point.  He denies any leakage around the tube.  He also reports that sometimes after he does the tube feeds he has a lot of burping and belching and fullness.  He has been doing roughly 2 cans of tube feed a day as he is now able to eat a lot more.  He denies any change in bowel habits.  He denies any vomiting.

## 2024-09-04 NOTE — ASSESSMENT
[FreeTextEntry1] : The tube appears to be functioning well today.  When discussing with the patient and his wife he had been taking the plunger off of the syringe and directing tube in that way via gravity.  I suspect that this was an allowing a lot of extra air to enter into his stomach and causing the belching he was having.  We discussed that it will be completely safe for him to do bolus tube feeds with the syringe and to just inject the tube feeds directly and with the syringe and then clamp it after.  He and his wife are agreeable to this plan.

## 2024-09-04 NOTE — PHYSICAL EXAM
[de-identified] : Soft nontender nondistended.  The tube site is clean dry and intact with tiniest amount of red irritation near where the bumper was.  I have able to push the tube and further and it does not feel like the washroom is too tight against the abdominal cavity.  On flushing with water it flushes quite easily.  Tube feeds were inserted greater than 100 mL and bolus with no distress.

## 2024-09-04 NOTE — PHYSICAL EXAM
[de-identified] : Soft nontender nondistended.  The tube site is clean dry and intact with tiniest amount of red irritation near where the bumper was.  I have able to push the tube and further and it does not feel like the washroom is too tight against the abdominal cavity.  On flushing with water it flushes quite easily.  Tube feeds were inserted greater than 100 mL and bolus with no distress.

## 2024-09-04 NOTE — PLAN
[FreeTextEntry1] : He will change his method to feeds as listed above.  He will return to clinic in the future if he notes that he no longer requires any tube feeds and desires removal.

## 2024-09-17 ENCOUNTER — APPOINTMENT (OUTPATIENT)
Dept: OTOLARYNGOLOGY | Facility: CLINIC | Age: 84
End: 2024-09-17
Payer: MEDICARE

## 2024-09-17 VITALS
BODY MASS INDEX: 17.61 KG/M2 | SYSTOLIC BLOOD PRESSURE: 134 MMHG | HEIGHT: 70 IN | DIASTOLIC BLOOD PRESSURE: 66 MMHG | HEART RATE: 75 BPM | TEMPERATURE: 95.6 F | WEIGHT: 123 LBS

## 2024-09-17 PROCEDURE — 99214 OFFICE O/P EST MOD 30 MIN: CPT | Mod: 25

## 2024-09-17 PROCEDURE — 31575 DIAGNOSTIC LARYNGOSCOPY: CPT | Mod: 78

## 2024-09-18 ENCOUNTER — OUTPATIENT (OUTPATIENT)
Dept: OUTPATIENT SERVICES | Facility: HOSPITAL | Age: 84
LOS: 1 days | End: 2024-09-18
Payer: MEDICARE

## 2024-09-18 ENCOUNTER — APPOINTMENT (OUTPATIENT)
Dept: CT IMAGING | Facility: HOSPITAL | Age: 84
End: 2024-09-18

## 2024-09-18 DIAGNOSIS — Z41.9 ENCOUNTER FOR PROCEDURE FOR PURPOSES OTHER THAN REMEDYING HEALTH STATE, UNSPECIFIED: Chronic | ICD-10-CM

## 2024-09-18 DIAGNOSIS — Z90.49 ACQUIRED ABSENCE OF OTHER SPECIFIED PARTS OF DIGESTIVE TRACT: Chronic | ICD-10-CM

## 2024-09-18 DIAGNOSIS — Z98.890 OTHER SPECIFIED POSTPROCEDURAL STATES: Chronic | ICD-10-CM

## 2024-09-18 DIAGNOSIS — Z90.89 ACQUIRED ABSENCE OF OTHER ORGANS: Chronic | ICD-10-CM

## 2024-09-18 PROCEDURE — 70491 CT SOFT TISSUE NECK W/DYE: CPT

## 2024-09-18 PROCEDURE — 70491 CT SOFT TISSUE NECK W/DYE: CPT | Mod: 26,MH

## 2024-09-18 PROCEDURE — 82565 ASSAY OF CREATININE: CPT

## 2024-09-20 ENCOUNTER — APPOINTMENT (OUTPATIENT)
Dept: INFUSION THERAPY | Facility: CLINIC | Age: 84
End: 2024-09-20

## 2024-10-04 NOTE — END OF VISIT
Medicare Wellness Visit  Plan for Preventive Care    A good way for you to stay healthy is to use preventive care.  Medicare covers many services that can help you stay healthy.* The goal of these services is to find any health problems as quickly as possible. Finding problems early can help make them easier to treat.  Your personal plan below lists the services you may need and when they are due.      Health Maintenance Summary     DM/CKD Microalbumin Ratio (Yearly)  Ordered on 10/4/2024    Medicare Advantage- Medicare Wellness Visit (Yearly - January to December)  Overdue since 1/1/2024    Influenza Vaccine (1)  Due since 9/1/2024    Diabetes A1C (Every 6 Months)  Ordered on 10/4/2024    Diabetes Eye Exam (Yearly)  Next due on 12/27/2024    DM/CKD GFR (Yearly)  Next due on 6/5/2025    Diabetes Foot Exam (Yearly)  Next due on 6/5/2025    DTaP/Tdap/Td Vaccine (3 - Td or Tdap)  Next due on 6/29/2025    Depression Screening (Yearly)  Next due on 9/28/2025    Breast Cancer Screening (Every 2 Years)  Next due on 5/14/2026    Colorectal Cancer Risk - Colonoscopy (Every 5 Years)  Next due on 10/19/2027    Hepatitis C Screening   Completed    Osteoporosis Screening   Completed    Pneumococcal Vaccine 65+   Completed    Shingles Vaccine   Completed    COVID-19 Vaccine   Completed    Meningococcal Vaccine   Aged Out    Hepatitis B Vaccine (For Physician/[facility] Discussion)   Aged Out    HPV Vaccine   Aged Out           Preventive Care for Women and Men    Heart Screenings (Cardiovascular):  Blood tests are used to check your cholesterol, lipid and triglyceride levels. High levels can increase your risk for heart disease and stroke. High levels can be treated with medications, diet and exercise. Lowering your levels can help keep your heart and blood vessels healthy.  Your provider will order these tests if they are needed.    An ultrasound is done to see if you have an abdominal aortic aneurysm (AAA).  This is an enlargement  of one of the main blood vessels that delivers blood to the body.   In the [place], 9,000 deaths are caused by AAA.  You may not even know you have this problem and as many as 1 in 3 people will have a serious problem if it is not treated.  Early diagnosis allows for more effective treatment and cure.  If you have a family history of AAA or are a male age 65-75 who has smoked, you are at higher risk of an AAA.  Your provider can order this test, if needed.    Colorectal Screening:  There are many tests that are used to check for cancer of your colon and rectum. You and your provider should discuss what test is best for you and when to have it done.  Options include:  Screening Colonoscopy: exam of the entire colon, seen through a flexible lighted tube.  Flexible Sigmoidoscopy: exam of the last third (sigmoid portion) of the colon and rectum, seen through a flexible lighted tube.  Cologuard DNA stool test: a sample of your stool is used to screen for cancer and unseen blood in your stool.  Fecal Occult Blood Test: a sample of your stool is studied to find any unseen blood    Flu Shot:  An immunization that helps to prevent influenza (the flu). You should get this every year. The best time to get the shot is in the fall.    Pneumococcal Shot:  Vaccines help prevent pneumococcal disease, which is any type of illness caused by Streptococcus pneumoniae bacteria. There are two kinds of pneumococcal vaccines available in the [place]:   Pneumococcal conjugate vaccines (PCV20 or Qtlojjv38®)  Pneumococcal polysaccharide vaccine (PPSV23 or Oywdquroe99®)  For those who have never received any pneumococcal conjugate vaccine, CDC recommends PVC20 for adults 65 years or older and adults 19 through 64 years old with certain medical conditions or risk factors.   For those who have previously received PCV13, this should be followed by a dose of PPSV23.     Hepatitis B Shot:  An immunization that helps to protect people  [Time Spent: ___ minutes] : I have spent [unfilled] minutes of time on the encounter. from getting Hepatitis B. Hepatitis B is a virus that spreads through contact with infected blood or body fluids. Many people with the virus do not have symptoms.  The virus can lead to serious problems, such as liver disease. Some people are at higher risk than others. Your doctor will tell you if you need this shot.     Diabetes Screening:  A test to measure sugar (glucose) in your blood is called a fasting blood sugar. Fasting means you cannot have food or drink for at least 8 hours before the test. This test can detect diabetes long before you may notice symptoms.    Glaucoma Screening:  Glaucoma screening is performed by your eye doctor. The test measures the fluid pressure inside your eyes to determine if you have glaucoma.     Hepatitis C Screening:  A blood test to see if you have the hepatitis C virus.  Hepatitis C attacks the liver and is a major cause of chronic liver disease.  Medicare will cover a single screening for all adults born between 1945 & 1965, or high risk patients (people who have injected illegal drugs or people who have had blood transfusions).  High risk patients who continue to inject illegal drugs can be screened for Hepatitis C every year.    Smoking and Tobacco-Use Cessation Counseling:  Tobacco is the single greatest cause of disease and early death in our country today. Medication and counseling together can increase a person’s chance of quitting for good.   Medicare covers two quitting attempts per year, with four counseling sessions per attempt (eight sessions in a 12 month period)    Preventive Screening tests for Women    Screening Mammograms and Breast Exams:  An x-ray of your breasts to check for breast cancer before you or your doctor may be able to feel it.  If breast cancer is found early it can usually be treated with success.    Pelvic Exams and Pap Tests:  An exam to check for cervical and vaginal cancer. A Pap test is a lab test in which cells are taken from your cervix  and sent to the lab to look for signs of cervical cancer. If cancer of the cervix is found early, chances for a cure are good. Testing can generally end at age 65, or if a woman has a hysterectomy for a benign condition. Your provider may recommend more frequent testing if certain abnormal results are found.    Bone Mass Measurements:  A painless x-ray of your bone density to see if you are at risk for a broken bone. Bone density refers to the thickness of bones or how tightly the bone tissue is packed.    Preventive Screening tests for Men    Prostate Screening:  Should you have a prostate cancer test (PSA)?  It is up to you to decide if you want a prostate cancer test. Talk to your clinician to find out if the test is right for you.  Things for you to consider and talk about should include:  Benefits and harms of the test  Your family history  How your race/ethnicity may influence the test  If the test may impact other medical conditions you have  Your values on screenings and treatments    *Medicare pays for many preventive services to keep you healthy. For some of these services, you might have to pay a deductible, coinsurance, and / or copayment.  The amounts vary depending on the type of services you need and the kind of Medicare health plan you have.    For further details on screenings offered by Medicare please visit: https://www.medicare.gov/coverage/preventive-screening-services

## 2024-10-22 ENCOUNTER — OUTPATIENT (OUTPATIENT)
Dept: OUTPATIENT SERVICES | Facility: HOSPITAL | Age: 84
LOS: 1 days | End: 2024-10-22

## 2024-10-22 ENCOUNTER — APPOINTMENT (OUTPATIENT)
Dept: RADIOLOGY | Facility: HOSPITAL | Age: 84
End: 2024-10-22
Payer: MEDICARE

## 2024-10-22 DIAGNOSIS — Z41.9 ENCOUNTER FOR PROCEDURE FOR PURPOSES OTHER THAN REMEDYING HEALTH STATE, UNSPECIFIED: Chronic | ICD-10-CM

## 2024-10-22 DIAGNOSIS — Z98.890 OTHER SPECIFIED POSTPROCEDURAL STATES: Chronic | ICD-10-CM

## 2024-10-22 DIAGNOSIS — Z90.89 ACQUIRED ABSENCE OF OTHER ORGANS: Chronic | ICD-10-CM

## 2024-10-22 DIAGNOSIS — Z90.49 ACQUIRED ABSENCE OF OTHER SPECIFIED PARTS OF DIGESTIVE TRACT: Chronic | ICD-10-CM

## 2024-10-22 PROCEDURE — 74230 X-RAY XM SWLNG FUNCJ C+: CPT | Mod: 26

## 2024-10-28 ENCOUNTER — NON-APPOINTMENT (OUTPATIENT)
Age: 84
End: 2024-10-28

## 2024-10-31 ENCOUNTER — NON-APPOINTMENT (OUTPATIENT)
Age: 84
End: 2024-10-31

## 2024-11-05 ENCOUNTER — APPOINTMENT (OUTPATIENT)
Dept: HEART AND VASCULAR | Facility: CLINIC | Age: 84
End: 2024-11-05
Payer: MEDICARE

## 2024-11-05 ENCOUNTER — NON-APPOINTMENT (OUTPATIENT)
Age: 84
End: 2024-11-05

## 2024-11-05 ENCOUNTER — APPOINTMENT (OUTPATIENT)
Dept: OTOLARYNGOLOGY | Facility: CLINIC | Age: 84
End: 2024-11-05
Payer: MEDICARE

## 2024-11-05 VITALS
TEMPERATURE: 97.7 F | SYSTOLIC BLOOD PRESSURE: 156 MMHG | HEART RATE: 66 BPM | OXYGEN SATURATION: 97 % | WEIGHT: 121 LBS | DIASTOLIC BLOOD PRESSURE: 80 MMHG | HEIGHT: 70 IN | BODY MASS INDEX: 17.32 KG/M2

## 2024-11-05 VITALS
HEIGHT: 70 IN | SYSTOLIC BLOOD PRESSURE: 107 MMHG | OXYGEN SATURATION: 98 % | BODY MASS INDEX: 17.32 KG/M2 | HEART RATE: 82 BPM | DIASTOLIC BLOOD PRESSURE: 62 MMHG | TEMPERATURE: 97.2 F | WEIGHT: 121 LBS

## 2024-11-05 VITALS — DIASTOLIC BLOOD PRESSURE: 73 MMHG | SYSTOLIC BLOOD PRESSURE: 137 MMHG

## 2024-11-05 DIAGNOSIS — Z86.79 PERSONAL HISTORY OF OTHER DISEASES OF THE CIRCULATORY SYSTEM: ICD-10-CM

## 2024-11-05 DIAGNOSIS — I35.8 OTHER NONRHEUMATIC AORTIC VALVE DISORDERS: ICD-10-CM

## 2024-11-05 DIAGNOSIS — R94.31 ABNORMAL ELECTROCARDIOGRAM [ECG] [EKG]: ICD-10-CM

## 2024-11-05 DIAGNOSIS — R06.02 SHORTNESS OF BREATH: ICD-10-CM

## 2024-11-05 DIAGNOSIS — I31.39 OTHER PERICARDIAL EFFUSION (NONINFLAMMATORY): ICD-10-CM

## 2024-11-05 DIAGNOSIS — R68.89 OTHER GENERAL SYMPTOMS AND SIGNS: ICD-10-CM

## 2024-11-05 DIAGNOSIS — I47.10 SUPRAVENTRICULAR TACHYCARDIA, UNSPECIFIED: ICD-10-CM

## 2024-11-05 DIAGNOSIS — R60.0 LOCALIZED EDEMA: ICD-10-CM

## 2024-11-05 DIAGNOSIS — R53.83 OTHER FATIGUE: ICD-10-CM

## 2024-11-05 DIAGNOSIS — I48.0 PAROXYSMAL ATRIAL FIBRILLATION: ICD-10-CM

## 2024-11-05 DIAGNOSIS — R03.0 ELEVATED BLOOD-PRESSURE READING, W/OUT DIAGNOSIS OF HYPERTENSION: ICD-10-CM

## 2024-11-05 DIAGNOSIS — I65.23 OCCLUSION AND STENOSIS OF BILATERAL CAROTID ARTERIES: ICD-10-CM

## 2024-11-05 PROCEDURE — 99214 OFFICE O/P EST MOD 30 MIN: CPT | Mod: 25

## 2024-11-05 PROCEDURE — 93000 ELECTROCARDIOGRAM COMPLETE: CPT

## 2024-11-05 PROCEDURE — 31575 DIAGNOSTIC LARYNGOSCOPY: CPT

## 2024-11-05 PROCEDURE — G2211 COMPLEX E/M VISIT ADD ON: CPT

## 2024-11-05 PROCEDURE — 93306 TTE W/DOPPLER COMPLETE: CPT

## 2024-11-05 PROCEDURE — ZZZZZ: CPT | Mod: NC

## 2024-11-20 PROCEDURE — 92611 MOTION FLUOROSCOPY/SWALLOW: CPT | Mod: GN

## 2024-11-20 PROCEDURE — 74230 X-RAY XM SWLNG FUNCJ C+: CPT

## 2024-11-21 ENCOUNTER — APPOINTMENT (OUTPATIENT)
Dept: SURGERY | Facility: CLINIC | Age: 84
End: 2024-11-21

## 2024-11-25 VITALS
DIASTOLIC BLOOD PRESSURE: 66 MMHG | RESPIRATION RATE: 17 BRPM | OXYGEN SATURATION: 97 % | WEIGHT: 128.97 LBS | HEART RATE: 75 BPM | SYSTOLIC BLOOD PRESSURE: 122 MMHG | TEMPERATURE: 98 F | HEIGHT: 70 IN

## 2024-11-25 NOTE — ASU PATIENT PROFILE, ADULT - NSICDXPASTSURGICALHX_GEN_ALL_CORE_FT
PAST SURGICAL HISTORY:  Elective surgery Biopsy of Larynx    Elective surgery Lymph Nodes Removal of the Neck    G tube feedings present    H/O neck dissection     History of lung surgery left upper lobectomy    History of tonsillectomy     S/P appendectomy     Surgery, elective mouth sx

## 2024-11-25 NOTE — ASU PATIENT PROFILE, ADULT - TEACHING/LEARNING FACTORS IMPACT ABILITY TO LEARN
----- Message from Lolly Orellana sent at 3/15/2018  4:42 PM EDT -----  Regarding: FW: Non-Urgent Medical Question  Contact: [phone]      ----- Message -----     From: Chiqui Hayden     Sent: 3/15/2018   4:41 PM       To: [facility] Nurse Pool  Subject: Non-Urgent Kelvin Hopkins,  I have been taking synthroid for almost 3 weeks now and I can detect no difference between this medication and the generic version. So, for now, I think I will continue to use the generic drug.   Herbert Farmer Addended by: DEJON COSME on: 11/20/2020 12:23 PM     Modules accepted: Orders, SmartSet     none

## 2024-11-25 NOTE — ASU PREOP CHECKLIST - LAST TOOK
Attempted to call patient to discuss recent lab results. Attempted to call patent back and was directed to voicemail. Left message asking to call back. Did not leave any PHI on message.      Ref Range & Units 09:27 10mo ago   Fasting Status hrs 12  UNKNOWN    Sodium 135 - 145 mmol/L 133Low   138    Potassium 3.4 - 5.1 mmol/L 4.7  5.0    Chloride 98 - 107 mmol/L 94Low   100    Carbon Dioxide 21 - 32 mmol/L 30  30    Anion Gap 10 - 20 mmol/L 14  13    Glucose 65 - 99 mg/dL 282High   221High  R   BUN 6 - 20 mg/dL 18  12 R   Creatinine 0.51 - 0.95 mg/dL 0.66  0.70 R   GFR Estimate,   >90  >90 CM   Comment: eGFR results = or >90 mL/min/1.73m2 = Normal kidney function.   GFR Estimate, Non   >90  >90 CM   Comment: eGFR results = or >90 mL/min/1.73m2 = Normal kidney function.   BUN/Creatinine Ratio 7 - 25 27High   17 R   CALCIUM 8.4 - 10.2 mg/dL 9.2  9.5 R   TOTAL BILIRUBIN 0.2 - 1.0 mg/dL 0.4  0.3 R   AST/SGOT <38 Units/L 16  18    ALT/SGPT <64 Units/L 31  40 R   ALK PHOSPHATASE 45 - 117 Units/L 33Low   30Low  CM   Comment: Low Alkaline Phosphatase results may indicate hypophosphatasia, malnutrition, hypothyroidism, or other disease states. Correlate with clinical symptoms.   TOTAL PROTEIN 6.4 - 8.2 g/dL 7.0     Albumin 3.6 - 5.1 g/dL 3.7  3.7 R   GLOBULIN 2.0 - 4.0 g/dL 3.3  3.0 R   A/G Ratio, Serum 1.0 - 2.4 1.1  1.2 R   Resulting Agency  [facility] ACL IL         Specimen Collected: 04/23/19 09:27 Last Resulted: 04/23/19 16:53          
solids

## 2024-11-25 NOTE — ASU PATIENT PROFILE, ADULT - NSICDXPASTMEDICALHX_GEN_ALL_CORE_FT
PAST MEDICAL HISTORY:  CAD (coronary artery disease) denies    HTN (hypertension)     Prostate cancer     Squamous cell carcinoma lung     Throat cancer R/T and chemo

## 2024-11-26 ENCOUNTER — RESULT REVIEW (OUTPATIENT)
Age: 84
End: 2024-11-26

## 2024-11-26 ENCOUNTER — TRANSCRIPTION ENCOUNTER (OUTPATIENT)
Age: 84
End: 2024-11-26

## 2024-11-26 ENCOUNTER — OUTPATIENT (OUTPATIENT)
Dept: OUTPATIENT SERVICES | Facility: HOSPITAL | Age: 84
LOS: 1 days | Discharge: HOME | End: 2024-11-26
Payer: MEDICARE

## 2024-11-26 ENCOUNTER — APPOINTMENT (OUTPATIENT)
Dept: OTOLARYNGOLOGY | Facility: HOSPITAL | Age: 84
End: 2024-11-26

## 2024-11-26 VITALS
DIASTOLIC BLOOD PRESSURE: 75 MMHG | RESPIRATION RATE: 20 BRPM | SYSTOLIC BLOOD PRESSURE: 161 MMHG | HEART RATE: 88 BPM | TEMPERATURE: 98 F

## 2024-11-26 DIAGNOSIS — Z90.89 ACQUIRED ABSENCE OF OTHER ORGANS: Chronic | ICD-10-CM

## 2024-11-26 DIAGNOSIS — Z41.9 ENCOUNTER FOR PROCEDURE FOR PURPOSES OTHER THAN REMEDYING HEALTH STATE, UNSPECIFIED: Chronic | ICD-10-CM

## 2024-11-26 DIAGNOSIS — Z98.890 OTHER SPECIFIED POSTPROCEDURAL STATES: Chronic | ICD-10-CM

## 2024-11-26 DIAGNOSIS — Z90.49 ACQUIRED ABSENCE OF OTHER SPECIFIED PARTS OF DIGESTIVE TRACT: Chronic | ICD-10-CM

## 2024-11-26 DIAGNOSIS — Z93.1 GASTROSTOMY STATUS: Chronic | ICD-10-CM

## 2024-11-26 PROBLEM — C61 MALIGNANT NEOPLASM OF PROSTATE: Chronic | Status: INACTIVE | Noted: 2024-06-25 | Resolved: 2024-11-25

## 2024-11-26 PROCEDURE — 71045 X-RAY EXAM CHEST 1 VIEW: CPT

## 2024-11-26 PROCEDURE — 31535 LARYNGOSCOPY W/BIOPSY: CPT

## 2024-11-26 PROCEDURE — C9399: CPT

## 2024-11-26 PROCEDURE — C1769: CPT

## 2024-11-26 PROCEDURE — 71045 X-RAY EXAM CHEST 1 VIEW: CPT | Mod: 26

## 2024-11-26 PROCEDURE — C1726: CPT

## 2024-11-26 PROCEDURE — 43220 ESOPHAGOSCOPY BALLOON <30MM: CPT

## 2024-11-26 PROCEDURE — 43195 ESOPHAGOSCOPY RIGID BALLOON: CPT

## 2024-11-26 PROCEDURE — 97597 DBRDMT OPN WND 1ST 20 CM/<: CPT

## 2024-11-26 DEVICE — GWIRE SAVARY 200CM: Type: IMPLANTABLE DEVICE | Status: FUNCTIONAL

## 2024-11-26 DEVICE — ESOPHAGEAL BALLOON CATH CRE FIXED WIRE 18-19-20MM: Type: IMPLANTABLE DEVICE | Status: FUNCTIONAL

## 2024-11-26 RX ORDER — LEUPROLIDE ACETATE 7.5 MG
0 SYRINGE (EA) INTRAMUSCULAR
Refills: 0 | DISCHARGE

## 2024-11-26 RX ORDER — ONDANSETRON HYDROCHLORIDE 4 MG/1
4 TABLET, FILM COATED ORAL EVERY 6 HOURS
Refills: 0 | Status: DISCONTINUED | OUTPATIENT
Start: 2024-11-26 | End: 2024-11-26

## 2024-11-26 RX ORDER — LABETALOL 100 MG/1
10 TABLET, FILM COATED ORAL ONCE
Refills: 0 | Status: DISCONTINUED | OUTPATIENT
Start: 2024-11-26 | End: 2024-11-26

## 2024-11-26 RX ORDER — HYDROMORPHONE HYDROCHLORIDE 2 MG/1
0.2 TABLET ORAL EVERY 4 HOURS
Refills: 0 | Status: DISCONTINUED | OUTPATIENT
Start: 2024-11-26 | End: 2024-11-26

## 2024-11-26 RX ORDER — ACETAMINOPHEN 500MG 500 MG/1
1000 TABLET, COATED ORAL ONCE
Refills: 0 | Status: DISCONTINUED | OUTPATIENT
Start: 2024-11-26 | End: 2024-11-26

## 2024-11-26 RX ORDER — 0.9 % SODIUM CHLORIDE 0.9 %
1000 INTRAVENOUS SOLUTION INTRAVENOUS
Refills: 0 | Status: DISCONTINUED | OUTPATIENT
Start: 2024-11-26 | End: 2024-11-26

## 2024-11-26 RX ADMIN — Medication 100 MILLILITER(S): at 11:55

## 2024-11-26 NOTE — ASU DISCHARGE PLAN (ADULT/PEDIATRIC) - CARE PROVIDER_API CALL
Millie Snow  Otolaryngology  [address] [place] [address], [address]  [place] [address]  Phone: [phone]  Fax: [phone]  Established Patient  Follow Up Time: 1 week

## 2024-11-26 NOTE — ASU DISCHARGE PLAN (ADULT/PEDIATRIC) - FINANCIAL ASSISTANCE
[facility] provides services at a reduced cost to those who are determined to be eligible through [facility]’s financial assistance program. Information regarding [facility]’s financial assistance program can be found by going to [email or web address] or by calling [phone].

## 2024-11-26 NOTE — PRE-ANESTHESIA EVALUATION ADULT - NSANTHPMHFT_GEN_ALL_CORE
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 Récurrent SCC of palate sp RT 2018. SCC  lung NATALIIA sp lobectomy 2020. Prostate ca sp RT [id number].   sp PEG, R maxillectomy, R neck exploration, R ALT flap 6/26. R EJV and facial artery used for anastomosis.  PMH paroxysmal SVT.

## 2024-11-26 NOTE — ASU DISCHARGE PLAN (ADULT/PEDIATRIC) - ASU DC SPECIAL INSTRUCTIONSFT
- *  - Follow up with your ENT provider Dr Snow on discharge. Call the office  for an appointment. - Dr. Snow will call you tomorrow  - Eat clear soft foods until directed  - See instructions from Dr. Munoz regarding g-tube site.  - Follow up with your ENT provider Dr Snow on discharge. Call the office  for an appointment.

## 2024-11-26 NOTE — BRIEF OPERATIVE NOTE - OPERATION/FINDINGS
Severe trismus and limited neck extension. Partially edentulous maxilla on right side. Prominent cervical spine osteophyte in hypopharynx limiting exposure with direct laryngoscopy. Prominent cricopharyngeal bar. CRE balloon dilation to (17.3 mm) 2.5 BÁRBARA.

## 2024-11-26 NOTE — PRE-ANESTHESIA EVALUATION ADULT - NSANTHOSAYNRD_GEN_A_CORE
"Chief Complaint   Patient presents with     RECHECK     right otorrhea        Initial BP 90/62 (BP Location: Right arm, Patient Position: Chair, Cuff Size: Adult Small)   Pulse 95   Temp 98.4  F (36.9  C) (Tympanic)   Wt 40.8 kg (90 lb)   SpO2 100%  Estimated body mass index is 21.84 kg/m  as calculated from the following:    Height as of 5/24/19: 1.372 m (4' 6\").    Weight as of 5/24/19: 41.1 kg (90 lb 9.6 oz).  Medication Reconciliation: complete    Maddie Vasquez LPN    " No. SOLA screening performed.  STOP BANG Legend: 0-2 = LOW Risk; 3-4 = INTERMEDIATE Risk; 5-8 = HIGH Risk

## 2024-12-20 ENCOUNTER — APPOINTMENT (OUTPATIENT)
Dept: HEMATOLOGY ONCOLOGY | Facility: CLINIC | Age: 84
End: 2024-12-20

## 2024-12-20 ENCOUNTER — APPOINTMENT (OUTPATIENT)
Dept: INFUSION THERAPY | Facility: CLINIC | Age: 84
End: 2024-12-20

## 2025-03-13 PROBLEM — C61 MALIGNANT NEOPLASM OF PROSTATE: Chronic | Status: ACTIVE | Noted: 2024-11-25

## 2025-03-24 ENCOUNTER — APPOINTMENT (OUTPATIENT)
Dept: NUCLEAR MEDICINE | Facility: HOSPITAL | Age: 85
End: 2025-03-24

## 2025-03-24 ENCOUNTER — OUTPATIENT (OUTPATIENT)
Dept: OUTPATIENT SERVICES | Facility: HOSPITAL | Age: 85
LOS: 1 days | End: 2025-03-24
Payer: MEDICARE

## 2025-03-24 DIAGNOSIS — Z41.9 ENCOUNTER FOR PROCEDURE FOR PURPOSES OTHER THAN REMEDYING HEALTH STATE, UNSPECIFIED: Chronic | ICD-10-CM

## 2025-03-24 DIAGNOSIS — Z93.1 GASTROSTOMY STATUS: Chronic | ICD-10-CM

## 2025-03-24 DIAGNOSIS — Z98.890 OTHER SPECIFIED POSTPROCEDURAL STATES: Chronic | ICD-10-CM

## 2025-03-24 DIAGNOSIS — Z90.49 ACQUIRED ABSENCE OF OTHER SPECIFIED PARTS OF DIGESTIVE TRACT: Chronic | ICD-10-CM

## 2025-03-24 DIAGNOSIS — Z90.89 ACQUIRED ABSENCE OF OTHER ORGANS: Chronic | ICD-10-CM

## 2025-03-24 PROCEDURE — 78815 PET IMAGE W/CT SKULL-THIGH: CPT | Mod: MD

## 2025-03-24 PROCEDURE — 82962 GLUCOSE BLOOD TEST: CPT

## 2025-03-24 PROCEDURE — 78815 PET IMAGE W/CT SKULL-THIGH: CPT | Mod: 26,PS

## 2025-03-24 PROCEDURE — A9552: CPT

## 2025-04-15 ENCOUNTER — APPOINTMENT (OUTPATIENT)
Dept: HEMATOLOGY ONCOLOGY | Facility: CLINIC | Age: 85
End: 2025-04-15
Payer: MEDICARE

## 2025-04-15 ENCOUNTER — NON-APPOINTMENT (OUTPATIENT)
Age: 85
End: 2025-04-15

## 2025-04-15 ENCOUNTER — APPOINTMENT (OUTPATIENT)
Dept: OTOLARYNGOLOGY | Facility: CLINIC | Age: 85
End: 2025-04-15
Payer: MEDICARE

## 2025-04-15 VITALS
SYSTOLIC BLOOD PRESSURE: 136 MMHG | TEMPERATURE: 98.2 F | WEIGHT: 112 LBS | HEART RATE: 63 BPM | BODY MASS INDEX: 16.03 KG/M2 | HEIGHT: 70 IN | DIASTOLIC BLOOD PRESSURE: 78 MMHG | OXYGEN SATURATION: 9 %

## 2025-04-15 VITALS
WEIGHT: 112 LBS | TEMPERATURE: 97 F | RESPIRATION RATE: 16 BRPM | HEART RATE: 69 BPM | OXYGEN SATURATION: 98 % | HEIGHT: 70 IN | BODY MASS INDEX: 16.03 KG/M2 | DIASTOLIC BLOOD PRESSURE: 77 MMHG | SYSTOLIC BLOOD PRESSURE: 146 MMHG

## 2025-04-15 DIAGNOSIS — C61 MALIGNANT NEOPLASM OF PROSTATE: ICD-10-CM

## 2025-04-15 DIAGNOSIS — H91.90 UNSPECIFIED HEARING LOSS, UNSPECIFIED EAR: ICD-10-CM

## 2025-04-15 PROCEDURE — 99215 OFFICE O/P EST HI 40 MIN: CPT | Mod: 25

## 2025-04-15 PROCEDURE — 31575 DIAGNOSTIC LARYNGOSCOPY: CPT

## 2025-04-15 PROCEDURE — 69210 REMOVE IMPACTED EAR WAX UNI: CPT

## 2025-04-15 PROCEDURE — 99213 OFFICE O/P EST LOW 20 MIN: CPT

## 2025-04-15 RX ORDER — ABIRATERONE ACETATE 500 MG/1
TABLET, FILM COATED ORAL
Refills: 0 | Status: ACTIVE | COMMUNITY

## 2025-04-15 RX ORDER — LEVOTHYROXINE, LIOTHYRONINE 38; 9 UG/1; UG/1
60 TABLET ORAL
Refills: 0 | Status: ACTIVE | COMMUNITY

## 2025-04-15 RX ORDER — ABIRATERONE ACETATE 500 MG/1
500 TABLET, FILM COATED ORAL
Refills: 0 | Status: DISCONTINUED | COMMUNITY
End: 2025-04-15

## 2025-04-15 RX ORDER — PREDNISONE 50 MG/1
TABLET ORAL
Refills: 0 | Status: ACTIVE | COMMUNITY

## 2025-04-15 RX ORDER — LEVOTHYROXINE SODIUM 0.03 MG/1
25 TABLET ORAL
Refills: 0 | Status: ACTIVE | COMMUNITY

## 2025-04-16 ENCOUNTER — APPOINTMENT (OUTPATIENT)
Dept: OTOLARYNGOLOGY | Facility: CLINIC | Age: 85
End: 2025-04-16
Payer: MEDICARE

## 2025-04-16 VITALS — BODY MASS INDEX: 16.35 KG/M2 | HEIGHT: 70 IN | WEIGHT: 114.2 LBS

## 2025-04-16 DIAGNOSIS — H90.A22 SENSORINEURAL HEARING LOSS, UNILATERAL, LEFT EAR, WITH RESTRICTED HEARING ON THE CONTRALATERAL SIDE: ICD-10-CM

## 2025-04-16 DIAGNOSIS — H65.21 CHRONIC SEROUS OTITIS MEDIA, RIGHT EAR: ICD-10-CM

## 2025-04-16 DIAGNOSIS — H90.A31 MIXED CONDUCTIVE AND SENSORINEURAL HEARING LOSS, UNILATERAL, RIGHT EAR WITH RESTRICTED HEARING ON THE  CONTRALATERAL SIDE: ICD-10-CM

## 2025-04-16 DIAGNOSIS — J34.3 HYPERTROPHY OF NASAL TURBINATES: ICD-10-CM

## 2025-04-16 PROCEDURE — 69433 CREATE EARDRUM OPENING: CPT | Mod: RT

## 2025-04-16 PROCEDURE — 31231 NASAL ENDOSCOPY DX: CPT

## 2025-04-16 PROCEDURE — 92557 COMPREHENSIVE HEARING TEST: CPT

## 2025-04-16 PROCEDURE — 99214 OFFICE O/P EST MOD 30 MIN: CPT | Mod: 25

## 2025-04-16 PROCEDURE — 92550 TYMPANOMETRY & REFLEX THRESH: CPT | Mod: 52

## 2025-06-03 ENCOUNTER — NON-APPOINTMENT (OUTPATIENT)
Age: 85
End: 2025-06-03

## 2025-06-03 ENCOUNTER — APPOINTMENT (OUTPATIENT)
Dept: OTOLARYNGOLOGY | Facility: CLINIC | Age: 85
End: 2025-06-03
Payer: MEDICARE

## 2025-06-03 VITALS
DIASTOLIC BLOOD PRESSURE: 62 MMHG | OXYGEN SATURATION: 95 % | HEART RATE: 66 BPM | SYSTOLIC BLOOD PRESSURE: 106 MMHG | TEMPERATURE: 62 F

## 2025-06-03 PROCEDURE — 31575 DIAGNOSTIC LARYNGOSCOPY: CPT

## 2025-06-03 PROCEDURE — 99215 OFFICE O/P EST HI 40 MIN: CPT | Mod: 25

## 2025-06-04 ENCOUNTER — APPOINTMENT (OUTPATIENT)
Dept: RADIOLOGY | Facility: CLINIC | Age: 85
End: 2025-06-04

## 2025-06-04 PROCEDURE — 77080 DXA BONE DENSITY AXIAL: CPT

## 2025-06-11 ENCOUNTER — NON-APPOINTMENT (OUTPATIENT)
Age: 85
End: 2025-06-11

## 2025-06-17 ENCOUNTER — APPOINTMENT (OUTPATIENT)
Dept: HEART AND VASCULAR | Facility: CLINIC | Age: 85
End: 2025-06-17
Payer: MEDICARE

## 2025-06-17 VITALS
BODY MASS INDEX: 16.32 KG/M2 | OXYGEN SATURATION: 96 % | TEMPERATURE: 97.1 F | WEIGHT: 114 LBS | DIASTOLIC BLOOD PRESSURE: 79 MMHG | SYSTOLIC BLOOD PRESSURE: 138 MMHG | HEIGHT: 70 IN | HEART RATE: 67 BPM

## 2025-06-17 VITALS — SYSTOLIC BLOOD PRESSURE: 150 MMHG | DIASTOLIC BLOOD PRESSURE: 80 MMHG

## 2025-06-17 PROBLEM — R09.89 LABILE HYPERTENSION: Status: ACTIVE | Noted: 2025-06-17

## 2025-06-17 PROCEDURE — 93000 ELECTROCARDIOGRAM COMPLETE: CPT

## 2025-06-17 PROCEDURE — 99214 OFFICE O/P EST MOD 30 MIN: CPT | Mod: 25

## 2025-06-17 RX ORDER — LEUPROLIDE ACETATE 1 MG/0.2ML
VIAL (ML) SUBCUTANEOUS
Refills: 0 | Status: ACTIVE | COMMUNITY

## 2025-06-17 RX ORDER — CYANOCOBALAMIN (VITAMIN B-12) 1000 MCG
TABLET ORAL
Refills: 0 | Status: ACTIVE | COMMUNITY

## 2025-06-18 ENCOUNTER — APPOINTMENT (OUTPATIENT)
Dept: NUCLEAR MEDICINE | Facility: HOSPITAL | Age: 85
End: 2025-06-18

## 2025-06-18 ENCOUNTER — APPOINTMENT (OUTPATIENT)
Dept: MRI IMAGING | Facility: CLINIC | Age: 85
End: 2025-06-18
Payer: MEDICARE

## 2025-06-18 ENCOUNTER — OUTPATIENT (OUTPATIENT)
Dept: OUTPATIENT SERVICES | Facility: HOSPITAL | Age: 85
LOS: 1 days | End: 2025-06-18

## 2025-06-18 ENCOUNTER — OUTPATIENT (OUTPATIENT)
Dept: OUTPATIENT SERVICES | Facility: HOSPITAL | Age: 85
LOS: 1 days | End: 2025-06-18
Payer: MEDICARE

## 2025-06-18 DIAGNOSIS — Z41.9 ENCOUNTER FOR PROCEDURE FOR PURPOSES OTHER THAN REMEDYING HEALTH STATE, UNSPECIFIED: Chronic | ICD-10-CM

## 2025-06-18 DIAGNOSIS — Z98.890 OTHER SPECIFIED POSTPROCEDURAL STATES: Chronic | ICD-10-CM

## 2025-06-18 DIAGNOSIS — Z93.1 GASTROSTOMY STATUS: Chronic | ICD-10-CM

## 2025-06-18 DIAGNOSIS — Z90.49 ACQUIRED ABSENCE OF OTHER SPECIFIED PARTS OF DIGESTIVE TRACT: Chronic | ICD-10-CM

## 2025-06-18 DIAGNOSIS — Z90.89 ACQUIRED ABSENCE OF OTHER ORGANS: Chronic | ICD-10-CM

## 2025-06-18 PROCEDURE — 71552 MRI CHEST W/O & W/DYE: CPT | Mod: 26

## 2025-06-18 PROCEDURE — 82962 GLUCOSE BLOOD TEST: CPT

## 2025-06-18 PROCEDURE — A9552: CPT

## 2025-06-18 PROCEDURE — 78815 PET IMAGE W/CT SKULL-THIGH: CPT

## 2025-06-18 PROCEDURE — 78815 PET IMAGE W/CT SKULL-THIGH: CPT | Mod: 26,PS

## 2025-06-23 ENCOUNTER — NON-APPOINTMENT (OUTPATIENT)
Age: 85
End: 2025-06-23

## 2025-06-24 ENCOUNTER — APPOINTMENT (OUTPATIENT)
Dept: THORACIC SURGERY | Facility: CLINIC | Age: 85
End: 2025-06-24
Payer: MEDICARE

## 2025-06-24 ENCOUNTER — APPOINTMENT (OUTPATIENT)
Dept: RADIATION ONCOLOGY | Facility: CLINIC | Age: 85
End: 2025-06-24

## 2025-06-24 ENCOUNTER — APPOINTMENT (OUTPATIENT)
Dept: HEMATOLOGY ONCOLOGY | Facility: CLINIC | Age: 85
End: 2025-06-24
Payer: MEDICARE

## 2025-06-24 ENCOUNTER — NON-APPOINTMENT (OUTPATIENT)
Age: 85
End: 2025-06-24

## 2025-06-24 VITALS
HEIGHT: 69 IN | WEIGHT: 111.44 LBS | RESPIRATION RATE: 15 BRPM | TEMPERATURE: 97.9 F | HEART RATE: 74 BPM | BODY MASS INDEX: 16.51 KG/M2 | DIASTOLIC BLOOD PRESSURE: 61 MMHG | SYSTOLIC BLOOD PRESSURE: 125 MMHG | OXYGEN SATURATION: 99 %

## 2025-06-24 VITALS
WEIGHT: 109 LBS | TEMPERATURE: 97.7 F | HEIGHT: 69 IN | SYSTOLIC BLOOD PRESSURE: 152 MMHG | BODY MASS INDEX: 16.14 KG/M2 | DIASTOLIC BLOOD PRESSURE: 78 MMHG | RESPIRATION RATE: 16 BRPM | OXYGEN SATURATION: 97 % | HEART RATE: 70 BPM

## 2025-06-24 VITALS
BODY MASS INDEX: 16.41 KG/M2 | RESPIRATION RATE: 18 BRPM | HEART RATE: 71 BPM | WEIGHT: 110.8 LBS | SYSTOLIC BLOOD PRESSURE: 143 MMHG | HEIGHT: 69 IN | DIASTOLIC BLOOD PRESSURE: 65 MMHG | OXYGEN SATURATION: 99 % | TEMPERATURE: 97.2 F

## 2025-06-24 PROCEDURE — 99203 OFFICE O/P NEW LOW 30 MIN: CPT

## 2025-06-24 PROCEDURE — 99214 OFFICE O/P EST MOD 30 MIN: CPT

## 2025-06-24 PROCEDURE — G2211 COMPLEX E/M VISIT ADD ON: CPT

## 2025-06-24 PROCEDURE — 99215 OFFICE O/P EST HI 40 MIN: CPT

## 2025-06-24 RX ORDER — BICALUTAMIDE 50 MG/1
TABLET ORAL
Refills: 0 | Status: DISCONTINUED | COMMUNITY
End: 2025-06-24

## 2025-06-26 PROBLEM — R63.4 WEIGHT LOSS: Status: ACTIVE | Noted: 2018-07-10

## 2025-06-27 ENCOUNTER — NON-APPOINTMENT (OUTPATIENT)
Age: 85
End: 2025-06-27

## 2025-06-27 PROBLEM — C15.9 SQUAMOUS CELL CARCINOMA OF ESOPHAGUS: Status: ACTIVE | Noted: 2025-06-12

## 2025-07-01 ENCOUNTER — NON-APPOINTMENT (OUTPATIENT)
Age: 85
End: 2025-07-01

## 2025-07-03 ENCOUNTER — APPOINTMENT (OUTPATIENT)
Dept: INTERVENTIONAL RADIOLOGY/VASCULAR | Facility: HOSPITAL | Age: 85
End: 2025-07-03

## 2025-07-08 ENCOUNTER — NON-APPOINTMENT (OUTPATIENT)
Age: 85
End: 2025-07-08

## 2025-07-08 ENCOUNTER — APPOINTMENT (OUTPATIENT)
Dept: INTERVENTIONAL RADIOLOGY/VASCULAR | Facility: HOSPITAL | Age: 85
End: 2025-07-08

## 2025-07-08 ENCOUNTER — RESULT REVIEW (OUTPATIENT)
Age: 85
End: 2025-07-08

## 2025-07-08 ENCOUNTER — OUTPATIENT (OUTPATIENT)
Dept: OUTPATIENT SERVICES | Facility: HOSPITAL | Age: 85
LOS: 1 days | End: 2025-07-08
Payer: MEDICARE

## 2025-07-08 VITALS
RESPIRATION RATE: 18 BRPM | TEMPERATURE: 98 F | OXYGEN SATURATION: 99 % | HEIGHT: 69 IN | SYSTOLIC BLOOD PRESSURE: 143 MMHG | WEIGHT: 111.11 LBS | DIASTOLIC BLOOD PRESSURE: 65 MMHG | HEART RATE: 71 BPM

## 2025-07-08 DIAGNOSIS — Z90.49 ACQUIRED ABSENCE OF OTHER SPECIFIED PARTS OF DIGESTIVE TRACT: Chronic | ICD-10-CM

## 2025-07-08 DIAGNOSIS — Z41.9 ENCOUNTER FOR PROCEDURE FOR PURPOSES OTHER THAN REMEDYING HEALTH STATE, UNSPECIFIED: Chronic | ICD-10-CM

## 2025-07-08 DIAGNOSIS — Z93.1 GASTROSTOMY STATUS: Chronic | ICD-10-CM

## 2025-07-08 DIAGNOSIS — Z98.890 OTHER SPECIFIED POSTPROCEDURAL STATES: Chronic | ICD-10-CM

## 2025-07-08 PROCEDURE — 49440 PLACE GASTROSTOMY TUBE PERC: CPT

## 2025-07-08 PROCEDURE — 85610 PROTHROMBIN TIME: CPT

## 2025-07-08 PROCEDURE — 36415 COLL VENOUS BLD VENIPUNCTURE: CPT

## 2025-07-08 PROCEDURE — L8699: CPT

## 2025-07-08 PROCEDURE — 85730 THROMBOPLASTIN TIME PARTIAL: CPT

## 2025-07-08 PROCEDURE — C1769: CPT

## 2025-07-08 PROCEDURE — C9399: CPT

## 2025-07-08 PROCEDURE — C1887: CPT

## 2025-07-10 PROBLEM — C15.9 ESOPHAGEAL CANCER: Status: ACTIVE | Noted: 2025-06-26

## 2025-07-10 PROBLEM — Z93.1 FEEDING BY G-TUBE: Status: ACTIVE | Noted: 2025-07-09

## 2025-07-17 ENCOUNTER — NON-APPOINTMENT (OUTPATIENT)
Age: 85
End: 2025-07-17

## 2025-07-18 ENCOUNTER — NON-APPOINTMENT (OUTPATIENT)
Age: 85
End: 2025-07-18

## 2025-07-23 ENCOUNTER — NON-APPOINTMENT (OUTPATIENT)
Age: 85
End: 2025-07-23

## 2025-07-24 ENCOUNTER — APPOINTMENT (OUTPATIENT)
Dept: INTERVENTIONAL RADIOLOGY/VASCULAR | Facility: HOSPITAL | Age: 85
End: 2025-07-24

## 2025-07-24 VITALS
DIASTOLIC BLOOD PRESSURE: 67 MMHG | HEART RATE: 73 BPM | HEIGHT: 69 IN | TEMPERATURE: 98 F | BODY MASS INDEX: 17.4 KG/M2 | RESPIRATION RATE: 18 BRPM | WEIGHT: 117.5 LBS | OXYGEN SATURATION: 98 % | SYSTOLIC BLOOD PRESSURE: 117 MMHG

## 2025-07-24 RX ORDER — OMEPRAZOLE MAGNESIUM 2.5 MG/1
2.5 GRANULE, DELAYED RELEASE ORAL
Qty: 30 | Refills: 0 | Status: ACTIVE | COMMUNITY
Start: 2025-07-24 | End: 1900-01-01

## 2025-07-24 RX ORDER — FAMOTIDINE 40 MG/5ML
40 POWDER, FOR SUSPENSION ORAL
Qty: 1 | Refills: 1 | Status: ACTIVE | COMMUNITY
Start: 2025-07-24 | End: 1900-01-01

## 2025-07-25 ENCOUNTER — NON-APPOINTMENT (OUTPATIENT)
Age: 85
End: 2025-07-25

## 2025-07-25 DIAGNOSIS — R19.7 DIARRHEA, UNSPECIFIED: ICD-10-CM

## 2025-07-25 RX ORDER — LOPERAMIDE HYDROCHLORIDE 2 MG/1
2 CAPSULE ORAL
Qty: 80 | Refills: 5 | Status: ACTIVE | COMMUNITY
Start: 2025-07-25 | End: 1900-01-01

## 2025-07-27 ENCOUNTER — INPATIENT (INPATIENT)
Facility: HOSPITAL | Age: 85
LOS: 0 days | Discharge: HOME | DRG: 395 | End: 2025-07-28
Attending: STUDENT IN AN ORGANIZED HEALTH CARE EDUCATION/TRAINING PROGRAM | Admitting: INTERNAL MEDICINE
Payer: COMMERCIAL

## 2025-07-27 VITALS
HEART RATE: 74 BPM | OXYGEN SATURATION: 96 % | WEIGHT: 111.99 LBS | RESPIRATION RATE: 17 BRPM | TEMPERATURE: 98 F | SYSTOLIC BLOOD PRESSURE: 159 MMHG | DIASTOLIC BLOOD PRESSURE: 77 MMHG | HEIGHT: 69 IN

## 2025-07-27 DIAGNOSIS — Z93.1 GASTROSTOMY STATUS: Chronic | ICD-10-CM

## 2025-07-27 DIAGNOSIS — Z90.49 ACQUIRED ABSENCE OF OTHER SPECIFIED PARTS OF DIGESTIVE TRACT: Chronic | ICD-10-CM

## 2025-07-27 DIAGNOSIS — E07.9 DISORDER OF THYROID, UNSPECIFIED: ICD-10-CM

## 2025-07-27 DIAGNOSIS — C61 MALIGNANT NEOPLASM OF PROSTATE: ICD-10-CM

## 2025-07-27 DIAGNOSIS — I10 ESSENTIAL (PRIMARY) HYPERTENSION: ICD-10-CM

## 2025-07-27 DIAGNOSIS — Z29.9 ENCOUNTER FOR PROPHYLACTIC MEASURES, UNSPECIFIED: ICD-10-CM

## 2025-07-27 DIAGNOSIS — Z41.9 ENCOUNTER FOR PROCEDURE FOR PURPOSES OTHER THAN REMEDYING HEALTH STATE, UNSPECIFIED: Chronic | ICD-10-CM

## 2025-07-27 DIAGNOSIS — C15.9 MALIGNANT NEOPLASM OF ESOPHAGUS, UNSPECIFIED: ICD-10-CM

## 2025-07-27 DIAGNOSIS — Z90.89 ACQUIRED ABSENCE OF OTHER ORGANS: Chronic | ICD-10-CM

## 2025-07-27 DIAGNOSIS — Z98.890 OTHER SPECIFIED POSTPROCEDURAL STATES: Chronic | ICD-10-CM

## 2025-07-27 DIAGNOSIS — E03.9 HYPOTHYROIDISM, UNSPECIFIED: ICD-10-CM

## 2025-07-27 DIAGNOSIS — K94.23 GASTROSTOMY MALFUNCTION: ICD-10-CM

## 2025-07-27 LAB
ANION GAP SERPL CALC-SCNC: 8 MMOL/L — SIGNIFICANT CHANGE UP (ref 5–17)
APTT BLD: 30.6 SEC — SIGNIFICANT CHANGE UP (ref 26.1–36.8)
BASOPHILS # BLD AUTO: 0.02 K/UL — SIGNIFICANT CHANGE UP (ref 0–0.2)
BASOPHILS NFR BLD AUTO: 0.4 % — SIGNIFICANT CHANGE UP (ref 0–2)
BLD GP AB SCN SERPL QL: NEGATIVE — SIGNIFICANT CHANGE UP
BUN SERPL-MCNC: 21 MG/DL — SIGNIFICANT CHANGE UP (ref 7–23)
CALCIUM SERPL-MCNC: 9.3 MG/DL — SIGNIFICANT CHANGE UP (ref 8.4–10.5)
CHLORIDE SERPL-SCNC: 96 MMOL/L — SIGNIFICANT CHANGE UP (ref 96–108)
CO2 SERPL-SCNC: 31 MMOL/L — SIGNIFICANT CHANGE UP (ref 22–31)
CREAT SERPL-MCNC: 0.56 MG/DL — SIGNIFICANT CHANGE UP (ref 0.5–1.3)
EGFR: 97 ML/MIN/1.73M2 — SIGNIFICANT CHANGE UP
EGFR: 97 ML/MIN/1.73M2 — SIGNIFICANT CHANGE UP
EOSINOPHIL # BLD AUTO: 0.01 K/UL — SIGNIFICANT CHANGE UP (ref 0–0.5)
EOSINOPHIL NFR BLD AUTO: 0.2 % — SIGNIFICANT CHANGE UP (ref 0–6)
GLUCOSE SERPL-MCNC: 93 MG/DL — SIGNIFICANT CHANGE UP (ref 70–99)
HCT VFR BLD CALC: 37.4 % — LOW (ref 39–50)
HGB BLD-MCNC: 11.7 G/DL — LOW (ref 13–17)
IMM GRANULOCYTES # BLD AUTO: 0.02 K/UL — SIGNIFICANT CHANGE UP (ref 0–0.07)
IMM GRANULOCYTES NFR BLD AUTO: 0.4 % — SIGNIFICANT CHANGE UP (ref 0–0.9)
INR BLD: 0.95 — SIGNIFICANT CHANGE UP (ref 0.85–1.16)
LYMPHOCYTES # BLD AUTO: 0.25 K/UL — LOW (ref 1–3.3)
LYMPHOCYTES NFR BLD AUTO: 4.7 % — LOW (ref 13–44)
MCHC RBC-ENTMCNC: 28.1 PG — SIGNIFICANT CHANGE UP (ref 27–34)
MCHC RBC-ENTMCNC: 31.3 G/DL — LOW (ref 32–36)
MCV RBC AUTO: 89.9 FL — SIGNIFICANT CHANGE UP (ref 80–100)
MONOCYTES # BLD AUTO: 0.26 K/UL — SIGNIFICANT CHANGE UP (ref 0–0.9)
MONOCYTES NFR BLD AUTO: 4.9 % — SIGNIFICANT CHANGE UP (ref 2–14)
NEUTROPHILS # BLD AUTO: 4.78 K/UL — SIGNIFICANT CHANGE UP (ref 1.8–7.4)
NEUTROPHILS NFR BLD AUTO: 89.4 % — HIGH (ref 43–77)
NRBC # BLD AUTO: 0 K/UL — SIGNIFICANT CHANGE UP (ref 0–0)
NRBC # FLD: 0 K/UL — SIGNIFICANT CHANGE UP (ref 0–0)
NRBC BLD AUTO-RTO: 0 /100 WBCS — SIGNIFICANT CHANGE UP (ref 0–0)
PLATELET # BLD AUTO: 180 K/UL — SIGNIFICANT CHANGE UP (ref 150–400)
PMV BLD: 9.1 FL — SIGNIFICANT CHANGE UP (ref 7–13)
POTASSIUM SERPL-MCNC: 4.4 MMOL/L — SIGNIFICANT CHANGE UP (ref 3.5–5.3)
POTASSIUM SERPL-SCNC: 4.4 MMOL/L — SIGNIFICANT CHANGE UP (ref 3.5–5.3)
PROTHROM AB SERPL-ACNC: 11.1 SEC — SIGNIFICANT CHANGE UP (ref 9.9–13.4)
RBC # BLD: 4.16 M/UL — LOW (ref 4.2–5.8)
RBC # FLD: 13.8 % — SIGNIFICANT CHANGE UP (ref 10.3–14.5)
RH IG SCN BLD-IMP: POSITIVE — SIGNIFICANT CHANGE UP
SODIUM SERPL-SCNC: 135 MMOL/L — SIGNIFICANT CHANGE UP (ref 135–145)
WBC # BLD: 5.34 K/UL — SIGNIFICANT CHANGE UP (ref 3.8–10.5)
WBC # FLD AUTO: 5.34 K/UL — SIGNIFICANT CHANGE UP (ref 3.8–10.5)

## 2025-07-27 PROCEDURE — 36415 COLL VENOUS BLD VENIPUNCTURE: CPT

## 2025-07-27 PROCEDURE — 99285 EMERGENCY DEPT VISIT HI MDM: CPT

## 2025-07-27 PROCEDURE — 85730 THROMBOPLASTIN TIME PARTIAL: CPT

## 2025-07-27 PROCEDURE — 99223 1ST HOSP IP/OBS HIGH 75: CPT

## 2025-07-27 PROCEDURE — 85610 PROTHROMBIN TIME: CPT

## 2025-07-27 PROCEDURE — 85025 COMPLETE CBC W/AUTO DIFF WBC: CPT

## 2025-07-27 PROCEDURE — 71045 X-RAY EXAM CHEST 1 VIEW: CPT | Mod: 26

## 2025-07-27 PROCEDURE — 80048 BASIC METABOLIC PNL TOTAL CA: CPT

## 2025-07-27 RX ORDER — BICALUTAMIDE 50 MG/1
1 TABLET, FILM COATED ORAL
Refills: 0 | DISCHARGE

## 2025-07-27 RX ORDER — PREDNISONE 20 MG/1
5 TABLET ORAL DAILY
Refills: 0 | Status: DISCONTINUED | OUTPATIENT
Start: 2025-07-28 | End: 2025-07-28

## 2025-07-27 RX ORDER — ACETAMINOPHEN 500 MG/5ML
650 LIQUID (ML) ORAL EVERY 6 HOURS
Refills: 0 | Status: DISCONTINUED | OUTPATIENT
Start: 2025-07-27 | End: 2025-07-28

## 2025-07-27 RX ORDER — TAMSULOSIN HYDROCHLORIDE 0.4 MG/1
0.4 CAPSULE ORAL AT BEDTIME
Refills: 0 | Status: DISCONTINUED | OUTPATIENT
Start: 2025-07-28 | End: 2025-07-28

## 2025-07-27 RX ORDER — METOPROLOL SUCCINATE 50 MG/1
25 TABLET, EXTENDED RELEASE ORAL DAILY
Refills: 0 | Status: DISCONTINUED | OUTPATIENT
Start: 2025-07-28 | End: 2025-07-28

## 2025-07-27 RX ORDER — LEVOTHYROXINE SODIUM 300 MCG
25 TABLET ORAL DAILY
Refills: 0 | Status: DISCONTINUED | OUTPATIENT
Start: 2025-07-28 | End: 2025-07-28

## 2025-07-27 RX ORDER — METOPROLOL SUCCINATE 50 MG/1
1 TABLET, EXTENDED RELEASE ORAL
Refills: 0 | DISCHARGE

## 2025-07-27 RX ORDER — BICALUTAMIDE 50 MG/1
50 TABLET, FILM COATED ORAL DAILY
Refills: 0 | Status: DISCONTINUED | OUTPATIENT
Start: 2025-07-28 | End: 2025-07-28

## 2025-07-27 RX ADMIN — Medication 1000 MILLILITER(S): at 16:15

## 2025-07-28 ENCOUNTER — TRANSCRIPTION ENCOUNTER (OUTPATIENT)
Age: 85
End: 2025-07-28

## 2025-07-28 VITALS
RESPIRATION RATE: 16 BRPM | DIASTOLIC BLOOD PRESSURE: 64 MMHG | SYSTOLIC BLOOD PRESSURE: 118 MMHG | HEIGHT: 69 IN | HEART RATE: 67 BPM | OXYGEN SATURATION: 95 % | WEIGHT: 111.99 LBS | TEMPERATURE: 99 F

## 2025-07-28 PROCEDURE — 86850 RBC ANTIBODY SCREEN: CPT

## 2025-07-28 PROCEDURE — 71045 X-RAY EXAM CHEST 1 VIEW: CPT

## 2025-07-28 PROCEDURE — C1769: CPT

## 2025-07-28 PROCEDURE — 85025 COMPLETE CBC W/AUTO DIFF WBC: CPT

## 2025-07-28 PROCEDURE — 86900 BLOOD TYPING SEROLOGIC ABO: CPT

## 2025-07-28 PROCEDURE — 86901 BLOOD TYPING SEROLOGIC RH(D): CPT

## 2025-07-28 PROCEDURE — 99233 SBSQ HOSP IP/OBS HIGH 50: CPT | Mod: GC

## 2025-07-28 PROCEDURE — 49450 REPLACE G/C TUBE PERC: CPT

## 2025-07-28 PROCEDURE — 99222 1ST HOSP IP/OBS MODERATE 55: CPT

## 2025-07-28 PROCEDURE — 49452 REPLACE G-J TUBE PERC: CPT

## 2025-07-28 PROCEDURE — 80048 BASIC METABOLIC PNL TOTAL CA: CPT

## 2025-07-28 PROCEDURE — 85610 PROTHROMBIN TIME: CPT

## 2025-07-28 PROCEDURE — C9399: CPT

## 2025-07-28 PROCEDURE — C1887: CPT

## 2025-07-28 PROCEDURE — L8699: CPT

## 2025-07-28 PROCEDURE — 36415 COLL VENOUS BLD VENIPUNCTURE: CPT

## 2025-07-28 PROCEDURE — 99497 ADVNCD CARE PLAN 30 MIN: CPT | Mod: GC

## 2025-07-28 PROCEDURE — 85730 THROMBOPLASTIN TIME PARTIAL: CPT

## 2025-07-28 PROCEDURE — 99285 EMERGENCY DEPT VISIT HI MDM: CPT

## 2025-07-28 RX ORDER — SODIUM CHLORIDE 9 G/1000ML
1000 INJECTION, SOLUTION INTRAVENOUS
Refills: 0 | Status: DISCONTINUED | OUTPATIENT
Start: 2025-07-28 | End: 2025-07-28

## 2025-07-28 RX ORDER — PREDNISONE 20 MG/1
1 TABLET ORAL
Qty: 0 | Refills: 0 | DISCHARGE

## 2025-07-28 RX ADMIN — SODIUM CHLORIDE 50 MILLILITER(S): 9 INJECTION, SOLUTION INTRAVENOUS at 10:33

## 2025-07-28 RX ADMIN — Medication 25 MICROGRAM(S): at 06:13

## 2025-07-28 RX ADMIN — BICALUTAMIDE 50 MILLIGRAM(S): 50 TABLET, FILM COATED ORAL at 13:04

## 2025-07-28 RX ADMIN — PREDNISONE 5 MILLIGRAM(S): 20 TABLET ORAL at 06:14

## 2025-07-30 ENCOUNTER — NON-APPOINTMENT (OUTPATIENT)
Age: 85
End: 2025-07-30

## 2025-07-30 VITALS
TEMPERATURE: 98.1 F | HEART RATE: 96 BPM | OXYGEN SATURATION: 96 % | DIASTOLIC BLOOD PRESSURE: 75 MMHG | BODY MASS INDEX: 16.89 KG/M2 | WEIGHT: 114.04 LBS | RESPIRATION RATE: 18 BRPM | HEIGHT: 69 IN | SYSTOLIC BLOOD PRESSURE: 113 MMHG

## 2025-08-01 DIAGNOSIS — J90 PLEURAL EFFUSION, NOT ELSEWHERE CLASSIFIED: ICD-10-CM

## 2025-08-04 ENCOUNTER — NON-APPOINTMENT (OUTPATIENT)
Age: 85
End: 2025-08-04

## 2025-08-05 ENCOUNTER — RESULT REVIEW (OUTPATIENT)
Age: 85
End: 2025-08-05

## 2025-08-05 ENCOUNTER — APPOINTMENT (OUTPATIENT)
Dept: INTERVENTIONAL RADIOLOGY/VASCULAR | Facility: HOSPITAL | Age: 85
End: 2025-08-05

## 2025-08-05 ENCOUNTER — OUTPATIENT (OUTPATIENT)
Dept: OUTPATIENT SERVICES | Facility: HOSPITAL | Age: 85
LOS: 1 days | End: 2025-08-05
Payer: MEDICARE

## 2025-08-05 DIAGNOSIS — Z93.1 GASTROSTOMY STATUS: Chronic | ICD-10-CM

## 2025-08-05 DIAGNOSIS — Z41.9 ENCOUNTER FOR PROCEDURE FOR PURPOSES OTHER THAN REMEDYING HEALTH STATE, UNSPECIFIED: Chronic | ICD-10-CM

## 2025-08-05 DIAGNOSIS — Z98.890 OTHER SPECIFIED POSTPROCEDURAL STATES: Chronic | ICD-10-CM

## 2025-08-05 DIAGNOSIS — Z90.89 ACQUIRED ABSENCE OF OTHER ORGANS: Chronic | ICD-10-CM

## 2025-08-05 DIAGNOSIS — Z90.49 ACQUIRED ABSENCE OF OTHER SPECIFIED PARTS OF DIGESTIVE TRACT: Chronic | ICD-10-CM

## 2025-08-05 PROCEDURE — 71045 X-RAY EXAM CHEST 1 VIEW: CPT | Mod: 26

## 2025-08-05 PROCEDURE — 32555 ASPIRATE PLEURA W/ IMAGING: CPT

## 2025-08-05 PROCEDURE — 71045 X-RAY EXAM CHEST 1 VIEW: CPT

## 2025-08-05 PROCEDURE — 32555 ASPIRATE PLEURA W/ IMAGING: CPT | Mod: RT

## 2025-08-05 PROCEDURE — C1729: CPT

## 2025-08-06 ENCOUNTER — APPOINTMENT (OUTPATIENT)
Dept: OTOLARYNGOLOGY | Facility: CLINIC | Age: 85
End: 2025-08-06
Payer: MEDICARE

## 2025-08-06 ENCOUNTER — NON-APPOINTMENT (OUTPATIENT)
Age: 85
End: 2025-08-06

## 2025-08-06 VITALS
WEIGHT: 113 LBS | BODY MASS INDEX: 16.69 KG/M2 | TEMPERATURE: 96.9 F | DIASTOLIC BLOOD PRESSURE: 58 MMHG | HEART RATE: 82 BPM | SYSTOLIC BLOOD PRESSURE: 95 MMHG

## 2025-08-06 DIAGNOSIS — F19.99 OTHER PSYCHOACTIVE SUBSTANCE USE, UNSPECIFIED WITH UNSPECIFIED PSYCHOACTIVE SUBSTANCE-INDUCED DISORDER: ICD-10-CM

## 2025-08-06 DIAGNOSIS — I10 ESSENTIAL (PRIMARY) HYPERTENSION: ICD-10-CM

## 2025-08-06 DIAGNOSIS — Z79.890 HORMONE REPLACEMENT THERAPY: ICD-10-CM

## 2025-08-06 DIAGNOSIS — K94.23 GASTROSTOMY MALFUNCTION: ICD-10-CM

## 2025-08-06 DIAGNOSIS — Z85.46 PERSONAL HISTORY OF MALIGNANT NEOPLASM OF PROSTATE: ICD-10-CM

## 2025-08-06 DIAGNOSIS — Z85.21 PERSONAL HISTORY OF MALIGNANT NEOPLASM OF LARYNX: ICD-10-CM

## 2025-08-06 DIAGNOSIS — R13.10 DYSPHAGIA, UNSPECIFIED: ICD-10-CM

## 2025-08-06 DIAGNOSIS — Z92.21 PERSONAL HISTORY OF ANTINEOPLASTIC CHEMOTHERAPY: ICD-10-CM

## 2025-08-06 DIAGNOSIS — D47.2 MONOCLONAL GAMMOPATHY: ICD-10-CM

## 2025-08-06 DIAGNOSIS — Z85.038 PERSONAL HISTORY OF OTHER MALIGNANT NEOPLASM OF LARGE INTESTINE: ICD-10-CM

## 2025-08-06 DIAGNOSIS — Z91.040 LATEX ALLERGY STATUS: ICD-10-CM

## 2025-08-06 DIAGNOSIS — E83.119 HEMOCHROMATOSIS, UNSPECIFIED: ICD-10-CM

## 2025-08-06 DIAGNOSIS — Z91.09 OTHER ALLERGY STATUS, OTHER THAN TO DRUGS AND BIOLOGICAL SUBSTANCES: ICD-10-CM

## 2025-08-06 DIAGNOSIS — E03.9 HYPOTHYROIDISM, UNSPECIFIED: ICD-10-CM

## 2025-08-06 DIAGNOSIS — Z92.3 PERSONAL HISTORY OF IRRADIATION: ICD-10-CM

## 2025-08-06 PROCEDURE — 31575 DIAGNOSTIC LARYNGOSCOPY: CPT

## 2025-08-06 PROCEDURE — 99214 OFFICE O/P EST MOD 30 MIN: CPT | Mod: 25

## 2025-08-07 ENCOUNTER — NON-APPOINTMENT (OUTPATIENT)
Age: 85
End: 2025-08-07

## 2025-08-07 VITALS
SYSTOLIC BLOOD PRESSURE: 116 MMHG | HEART RATE: 70 BPM | HEIGHT: 69 IN | WEIGHT: 114 LBS | DIASTOLIC BLOOD PRESSURE: 72 MMHG | RESPIRATION RATE: 18 BRPM | TEMPERATURE: 98.3 F | BODY MASS INDEX: 16.88 KG/M2 | OXYGEN SATURATION: 99 %

## 2025-08-08 ENCOUNTER — NON-APPOINTMENT (OUTPATIENT)
Age: 85
End: 2025-08-08

## 2025-08-08 RX ORDER — GUAIFENESIN 300 MG/15ML
300 SOLUTION ORAL EVERY 4 HOURS
Qty: 1 | Refills: 6 | Status: ACTIVE | COMMUNITY
Start: 2025-08-08 | End: 1900-01-01

## 2025-08-08 RX ORDER — LIDOCAINE HYDROCHLORIDE 20 MG/ML
2 SOLUTION ORAL; TOPICAL
Qty: 400 | Refills: 2 | Status: ACTIVE | COMMUNITY
Start: 2025-08-08 | End: 1900-01-01

## 2025-08-08 RX ORDER — GUAIFENESIN 600 MG/1
600 TABLET, EXTENDED RELEASE ORAL
Qty: 1 | Refills: 3 | Status: ACTIVE | COMMUNITY
Start: 2025-08-08 | End: 1900-01-01

## 2025-08-20 ENCOUNTER — APPOINTMENT (OUTPATIENT)
Dept: HEMATOLOGY ONCOLOGY | Facility: CLINIC | Age: 85
End: 2025-08-20
Payer: MEDICARE

## 2025-08-20 VITALS
HEART RATE: 76 BPM | HEIGHT: 67.32 IN | SYSTOLIC BLOOD PRESSURE: 136 MMHG | OXYGEN SATURATION: 98 % | BODY MASS INDEX: 17.68 KG/M2 | WEIGHT: 114 LBS | DIASTOLIC BLOOD PRESSURE: 74 MMHG | TEMPERATURE: 97.9 F | RESPIRATION RATE: 18 BRPM

## 2025-08-20 DIAGNOSIS — C15.9 MALIGNANT NEOPLASM OF ESOPHAGUS, UNSPECIFIED: ICD-10-CM

## 2025-08-20 PROCEDURE — 99213 OFFICE O/P EST LOW 20 MIN: CPT

## 2025-08-20 RX ORDER — BICALUTAMIDE 50 MG/1
TABLET ORAL
Refills: 0 | Status: ACTIVE | COMMUNITY

## 2025-08-26 ENCOUNTER — APPOINTMENT (OUTPATIENT)
Dept: HEMATOLOGY ONCOLOGY | Facility: CLINIC | Age: 85
End: 2025-08-26

## 2025-08-28 ENCOUNTER — NON-APPOINTMENT (OUTPATIENT)
Age: 85
End: 2025-08-28

## 2025-09-11 ENCOUNTER — NON-APPOINTMENT (OUTPATIENT)
Age: 85
End: 2025-09-11

## (undated) DEVICE — BUR STRYKER CARBIDE ROUND 4MM

## (undated) DEVICE — VENODYNE/SCD SLEEVE CALF MEDIUM

## (undated) DEVICE — SYR LUER LOK 50CC

## (undated) DEVICE — WARMING BLANKET LOWER ADULT

## (undated) DEVICE — SAW BLADE STRYKER RECIPROCATING 27MMX0.38MM

## (undated) DEVICE — Device

## (undated) DEVICE — SOL ANTI FOG

## (undated) DEVICE — LIGASURE EXACT DISSECTOR

## (undated) DEVICE — ELCTR COLORADO 3CM

## (undated) DEVICE — DRAPE SPLIT SHEET 77" X 108"

## (undated) DEVICE — POSITIONER FOAM EGG CRATE ULNAR 2PCS (PINK)

## (undated) DEVICE — GLV 7.5 PROTEXIS (WHITE)

## (undated) DEVICE — SPONGE SURGICAL STRIP 1/2 X 6"

## (undated) DEVICE — BUR STRYKER EGG 4MM

## (undated) DEVICE — SPONGE PEANUT AUTO COUNT

## (undated) DEVICE — NDL HYPO SAFE 18G X 1.5" (PINK)

## (undated) DEVICE — DRAPE VARI-LENS2 MICROSCPOPE 68MM

## (undated) DEVICE — LONE STAR RETRACTOR RING 12MM BLUNT DISP

## (undated) DEVICE — DRAIN PENROSE 5/8" X 18" LATEX

## (undated) DEVICE — SUT SILK 3-0 18" TIES

## (undated) DEVICE — LONE STAR ELASTIC STAY HOOK 12MM BLUNT

## (undated) DEVICE — DRSG CURITY GAUZE SPONGE 4 X 4" 12-PLY NON-STERILE

## (undated) DEVICE — STAPLER SKIN PROXIMATE

## (undated) DEVICE — SUT PROLENE 2-0 30" CT-2

## (undated) DEVICE — DRAPE MAGNETIC INSTRUMENT MEDIUM

## (undated) DEVICE — SYR LUER LOK 20CC

## (undated) DEVICE — CANNULA ANT CHMBR 27GX22MM

## (undated) DEVICE — DRAPE MAYO STAND 30"

## (undated) DEVICE — SYR CONTROL LUER LOK 10CC

## (undated) DEVICE — ELCTR BOVIE SUCTION 8FR 6"

## (undated) DEVICE — SYR LUER LOK 3CC

## (undated) DEVICE — DRAPE TOWEL BLUE 17" X 24"

## (undated) DEVICE — SUT SILK 2-0 30" PSL

## (undated) DEVICE — ELCTR BOVIE PENCIL BLADE 10FT

## (undated) DEVICE — SUT NYLON 8-0 5" DRM6

## (undated) DEVICE — SAW BLADE STRYKER RECIPROCATING 22.5MMX0.38MM

## (undated) DEVICE — MARKING PEN W RULER

## (undated) DEVICE — ELCTR GROUNDING PAD ADULT COVIDIEN

## (undated) DEVICE — TUBING SUCTION 20FT

## (undated) DEVICE — SOL INJ NS 0.9% 100ML

## (undated) DEVICE — ELCTR BOVIE PENCIL HANDPIECE ROCKER SWITCH 15FT

## (undated) DEVICE — PREP BETADINE KIT

## (undated) DEVICE — SHEARS HARMONIC FOCUS CURVED SHEARS 9CM

## (undated) DEVICE — BUR STRYKER CARBIDE CROSS CUT FISSURE 1.2MM

## (undated) DEVICE — NDL HYPO SAFE 22G X 1.5" (BLACK)

## (undated) DEVICE — DRAPE INSTRUMENT POUCH 6.75" X 11"

## (undated) DEVICE — LUBRICATING JELLY ONESHOT 1.25OZ

## (undated) DEVICE — GLV 6.5 PROTEXIS (WHITE)

## (undated) DEVICE — NDL INJ RIGID 24G 16X25

## (undated) DEVICE — GEL AQUSNC PACKET 20GR

## (undated) DEVICE — SUCTION YANKAUER BULBOUS TIP W VENT

## (undated) DEVICE — PACK D&C

## (undated) DEVICE — BIPOLAR FORCEP SYMMETRY BAYONET STR 8.25" X 1.5MM (BLUE)

## (undated) DEVICE — CATH IV SAFE BC 18G X 1.16" (GREEN)

## (undated) DEVICE — FOLEY TRAY 16FR 5CC LF UMETER CLOSED

## (undated) DEVICE — BUR STRYKER CROSS CUT 1.6MM

## (undated) DEVICE — PACK TONSIL ADENOID

## (undated) DEVICE — BLADE SCALPEL SAFETYLOCK #15

## (undated) DEVICE — SPECIMEN CONTAINER 4OZ

## (undated) DEVICE — SUT SILK 2-0 12-18"

## (undated) DEVICE — INFLATION DEVICE BIG 60

## (undated) DEVICE — SUT MONOCRYL 4-0 27" PS-2 UNDYED

## (undated) DEVICE — NDL COUNTER FOAM AND MAGNET 20-40

## (undated) DEVICE — ELCTR BOVIE TIP BLADE INSULATED 2.75" EDGE

## (undated) DEVICE — SUT CHROMIC 3-0 27" SH

## (undated) DEVICE — SAW BLADE STRYKER PRECISION 9X0.51X31MM

## (undated) DEVICE — SAW BLADE STRYKER PRECISION THIN SAGITTAL MEDIUM 9MM X 25MM

## (undated) DEVICE — NEURO SURGICAL STRIP 1/2 X 6"

## (undated) DEVICE — URETERAL CATH RED RUBBER 10FR (BLACK)

## (undated) DEVICE — DRSG GAUZE VASELINE PETROLEUM 3 X 9"

## (undated) DEVICE — SOL IRR POUR NS 0.9% 500ML

## (undated) DEVICE — PACK UPPER BODY

## (undated) DEVICE — NDL HYPO SAFE 25G X 1.5" (ORANGE)

## (undated) DEVICE — VESSEL LOOP MAXI-BLUE 0.120" X 16"

## (undated) DEVICE — WARMING BLANKET UPPER ADULT

## (undated) DEVICE — SYR LUER LOK 5CC

## (undated) DEVICE — SUT VICRYL 3-0 27" SH

## (undated) DEVICE — BIPOLAR FORCEP KIRWAN JEWELERS STR 4" X 0.4MM W 12FT CORD (GREEN)

## (undated) DEVICE — SPEAR SURG EYE WECK-CELL CELOS